# Patient Record
Sex: MALE | Race: WHITE | NOT HISPANIC OR LATINO | Employment: OTHER | URBAN - METROPOLITAN AREA
[De-identification: names, ages, dates, MRNs, and addresses within clinical notes are randomized per-mention and may not be internally consistent; named-entity substitution may affect disease eponyms.]

---

## 2017-01-25 ENCOUNTER — OFFICE VISIT (OUTPATIENT)
Dept: MEDICAL GROUP | Facility: PHYSICIAN GROUP | Age: 72
End: 2017-01-25
Payer: MEDICARE

## 2017-01-25 ENCOUNTER — TELEPHONE (OUTPATIENT)
Dept: MEDICAL GROUP | Facility: PHYSICIAN GROUP | Age: 72
End: 2017-01-25

## 2017-01-25 VITALS
DIASTOLIC BLOOD PRESSURE: 84 MMHG | TEMPERATURE: 96.6 F | HEIGHT: 70 IN | RESPIRATION RATE: 16 BRPM | BODY MASS INDEX: 30.21 KG/M2 | WEIGHT: 211 LBS | HEART RATE: 62 BPM | SYSTOLIC BLOOD PRESSURE: 120 MMHG | OXYGEN SATURATION: 99 %

## 2017-01-25 DIAGNOSIS — G47.52 REM SLEEP BEHAVIOR DISORDER: ICD-10-CM

## 2017-01-25 DIAGNOSIS — Z87.898 HISTORY OF SEIZURES: ICD-10-CM

## 2017-01-25 DIAGNOSIS — I10 ESSENTIAL HYPERTENSION: ICD-10-CM

## 2017-01-25 DIAGNOSIS — R44.1 VISUAL HALLUCINATIONS: ICD-10-CM

## 2017-01-25 PROCEDURE — G8419 CALC BMI OUT NRM PARAM NOF/U: HCPCS | Performed by: FAMILY MEDICINE

## 2017-01-25 PROCEDURE — 1101F PT FALLS ASSESS-DOCD LE1/YR: CPT | Performed by: FAMILY MEDICINE

## 2017-01-25 PROCEDURE — 1036F TOBACCO NON-USER: CPT | Performed by: FAMILY MEDICINE

## 2017-01-25 PROCEDURE — G8482 FLU IMMUNIZE ORDER/ADMIN: HCPCS | Performed by: FAMILY MEDICINE

## 2017-01-25 PROCEDURE — G8432 DEP SCR NOT DOC, RNG: HCPCS | Performed by: FAMILY MEDICINE

## 2017-01-25 PROCEDURE — 3017F COLORECTAL CA SCREEN DOC REV: CPT | Mod: 8P | Performed by: FAMILY MEDICINE

## 2017-01-25 PROCEDURE — 4040F PNEUMOC VAC/ADMIN/RCVD: CPT | Performed by: FAMILY MEDICINE

## 2017-01-25 PROCEDURE — 99214 OFFICE O/P EST MOD 30 MIN: CPT | Performed by: FAMILY MEDICINE

## 2017-01-25 ASSESSMENT — PATIENT HEALTH QUESTIONNAIRE - PHQ9: CLINICAL INTERPRETATION OF PHQ2 SCORE: 0

## 2017-01-25 NOTE — PROGRESS NOTES
"Chief Complaint   Patient presents with   • Sleep Disruption   • Back Pain       HISTORY OF PRESENT ILLNESS: Patient is a 71 y.o. male established patient here today for the following concerns:    1. REM sleep behavior disorder  2. Visual hallucinations  3. History of seizures  Here today with concerns over \"acting out my dreams\".  Reports episodes in which he has physically been fighting animals or people only to injure himself and then wake up. Twice he has had to go to the ER for contusions or lacerations.  The other night he dreamed he was bouncing a basket ball and his wife work him up after he was \"trying to bounce her\".  He reports he also has had years of visual hallucinations, which have not changed.  Reports seeing and hearing things that are not present with good awareness that they are not real.  He also tells me he has remote history of seizures as an adult that came on when he was drinking heavily.  He was briefly treated with dilantin, but after researching it and getting another opinion was taken off of it.  He reports that he has lived with \"earthquakes\" which he feels are likely seizures in which he does not loose consciousness.  He has not had any witnessed tonic clonic movements on syncopal episodes.  Denies any tremors or weakness.  He has had difficulty with some back pain recently that radiates into the left leg, but taking it easy seems to help.        Past Medical, Social, and Family history reviewed and updated in EPIC    Allergies:Flagyl    Current Outpatient Prescriptions   Medication Sig Dispense Refill   • albuterol (VENTOLIN OR PROVENTIL) 108 (90 BASE) MCG/ACT Aero Soln inhalation aerosol Inhale 2 Puffs by mouth every 6 hours as needed for Shortness of Breath. 8.5 g 3   • lisinopril-hydrochlorothiazide (PRINZIDE, ZESTORETIC) 10-12.5 MG per tablet Take 1 Tab by mouth every day. 90 Tab 3   • aspirin EC (ECOTRIN) 81 MG TBEC Take 81 mg by mouth every day.     • cetirizine (ZYRTEC ALLERGY) " "10 MG TABS Take 10 mg by mouth every day.     • ADVAIR DISKUS 250-50 MCG/DOSE AEROSOL POWDER, BREATH ACTIVATED INHALE 1 PUFF BY MOUTH EVERY 12 HOURS 1 Inhaler 3   • azithromycin (ZITHROMAX) 250 MG Tab As directed 6 Tab 0   • metaxalone (SKELAXIN) 800 MG TABS Take 1 Tab by mouth 3 times a day as needed for Mild Pain. 60 Tab 0     No current facility-administered medications for this visit.         ROS:  Review of Systems   Constitutional: Negative for fever, chills, weight loss and malaise/fatigue.   HENT: Negative for ear pain, nosebleeds, congestion, sore throat and neck pain.    Eyes: Negative for blurred vision.   Respiratory: Negative for cough, sputum production, shortness of breath and wheezing.    Cardiovascular: Negative for chest pain, palpitations,  and leg swelling.   Gastrointestinal: Negative for heartburn, nausea, vomiting, diarrhea and abdominal pain.   Genitourinary: Negative for dysuria, urgency and frequency.   Musculoskeletal: Negative for myalgias, back pain and joint pain.   Skin: Negative for rash and itching.   Neurological: Negative for dizziness, tingling, tremors, sensory change, focal weakness and headaches.   Endo/Heme/Allergies: Does not bruise/bleed easily.   Psychiatric/Behavioral: Negative for depression, anxiety, suicidal ideas, insomnia and memory loss.      Exam:  Blood pressure 120/84, pulse 62, temperature 35.9 °C (96.6 °F), resp. rate 16, height 1.778 m (5' 10\"), weight 95.709 kg (211 lb), SpO2 99 %.    General:  Well nourished, well developed in NAD  Head is grossly normal.  Neck: Supple without JVD   Pulmonary:  Normal effort.   Cardiovascular: Regular rate  Extremities: no clubbing, cyanosis, or edema.  Psych: affect appropriate      Please note that this dictation was created using voice recognition software. I have made every reasonable attempt to correct obvious errors, but I expect that there are errors of grammar and possibly content that I did not discover before " finalizing the note.    Assessment/Plan:  1. REM sleep behavior disorder  Discussed with patient that there may be several causes for this.  I'd like to update his imaging and obtain EEG as well as consult with neurology as sometimes these are precursors to other neurodegenerative diseases.    - REFERRAL TO NEUROLOGY  - MR-BRAIN-WITH & W/O; Future  - EEG; Standing    2. Visual hallucinations    - MR-BRAIN-WITH & W/O; Future  - EEG; Standing    3. History of seizures    - MR-BRAIN-WITH & W/O; Future  - EEG; Standing    Follow up after neuro consult.

## 2017-01-25 NOTE — MR AVS SNAPSHOT
"        Florin Brooks   2017 10:40 AM   Office Visit   MRN: [id number]    Department:  [facility]   Dept Phone:  [phone]    Description:  Male : 1945   Provider:  Yessenia Thomas M.D.           Reason for Visit     Sleep Disruption     Back Pain           Allergies as of 2017     Allergen Noted Reactions    Flagyl [Metronidazole Hcl] 2013   Anaphylaxis      You were diagnosed with     REM sleep behavior disorder   [327.42.ICD-9-CM]       Visual hallucinations   [524859]       History of seizures   [771133]         Vital Signs     Blood Pressure Pulse Temperature Respirations Height Weight    120/84 mmHg 62 35.9 °C (96.6 °F) 16 1.778 m (5' 10\") 95.709 kg (211 lb)    Body Mass Index Oxygen Saturation Smoking Status             30.28 kg/m2 99% Former Smoker         Basic Information     Date Of Birth Sex Race Ethnicity Preferred Language    1945 Male Unknown Unknown English      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   10/22/2013 - Present    HTN (hypertension) I10   2013 - Present    Elevated PSA R97.20   2013 - Present    Cervical radiculopathy M54.12   2014 - Present      Health Maintenance        Date Due Completion Dates    IMM DTaP/Tdap/Td Vaccine (1 - Tdap) 12/3/1964 ---    IMM ZOSTER VACCINE 12/3/2005 ---    COLON CANCER SCREENING ANNUAL FIT 10/27/2016 10/27/2015    IMM PNEUMOCOCCAL 65+ (ADULT) LOW/MEDIUM RISK SERIES (2 of 2 - PPSV23) 2017            Current Immunizations     13-VALENT PCV PREVNAR 2016    Influenza Vaccine Adult HD 2016 10:33 AM, 10/27/2015 10:58 AM, 2014      Below and/or attached are the medications your provider " expects you to take. Review all of your home medications and newly ordered medications with your provider and/or pharmacist. Follow medication instructions as directed by your provider and/or pharmacist. Please keep your medication list with you and share with your provider. Update the information when medications are discontinued, doses are changed, or new medications (including over-the-counter products) are added; and carry medication information at all times in the event of emergency situations     Allergies:  FLAGYL - Anaphylaxis               Medications  Valid as of: January 25, 2017 - 10:53 AM    Generic Name Brand Name Tablet Size Instructions for use    Albuterol Sulfate (Aero Soln) albuterol 108 (90 BASE) MCG/ACT Inhale 2 Puffs by mouth every 6 hours as needed for Shortness of Breath.        Aspirin (Tablet Delayed Response) ECOTRIN 81 MG Take 81 mg by mouth every day.        Azithromycin (Tab) ZITHROMAX 250 MG As directed        Cetirizine HCl (Tab) ZYRTEC 10 MG Take 10 mg by mouth every day.        Fluticasone-Salmeterol (AEROSOL POWDER, BREATH ACTIVATED) ADVAIR DISKUS 250-50 MCG/DOSE INHALE 1 PUFF BY MOUTH EVERY 12 HOURS        Lisinopril-Hydrochlorothiazide (Tab) PRINZIDE, ZESTORETIC 10-12.5 MG Take 1 Tab by mouth every day.        Metaxalone (Tab) SKELAXIN 800 MG Take 1 Tab by mouth 3 times a day as needed for Mild Pain.        .                 Medicines prescribed today were sent to:     [id number], [place] - [address] AT [facility] [address]    [address] [facility] [address]    Phone: [phone] Fax: [phone]    Open 24 Hours?: No      Medication refill instructions:       If your prescription bottle indicates you have medication refills left, it is not necessary to call your provider’s office. Please contact your pharmacy and they will refill your medication.    If your prescription bottle indicates you do not have any refills left, you may request refills  at any time through one of the following ways: The online [email or web address] system (except Urgent Care), by calling your provider’s office, or by asking your pharmacy to contact your provider’s office with a refill request. Medication refills are processed only during regular business hours and may not be available until the next business day. Your provider may request additional information or to have a follow-up visit with you prior to refilling your medication.   *Please Note: Medication refills are assigned a new Rx number when refilled electronically. Your pharmacy may indicate that no refills were authorized even though a new prescription for the same medication is available at the pharmacy. Please request the medicine by name with the pharmacy before contacting your provider for a refill.        Your To Do List     Future Labs/Procedures Complete By Expires    MR-BRAIN-WITH & W/O  As directed 1/25/2018    Standing Orders Interval Expires    EEG   1/25/2018      Referral     A referral request has been sent to our patient care coordination department. Please allow 3-5 business days for us to process this request and contact you either by phone or mail. If you do not hear from us by the 5th business day, please call us at [phone].           [email or web address] Access Code: Activation code not generated  Current [email or web address] Status: Active

## 2017-01-25 NOTE — TELEPHONE ENCOUNTER
1. Caller Name: Florin Brooks                                           Call Back Number: [phone] (home)         Patient approves a detailed voicemail message: N\A     Pt is scheduled for an MRI and was told he needs to get his creatinine checked. Please lt pt know when order is submitted and he will come here to get done.  Thank you

## 2017-02-02 RX ORDER — LISINOPRIL AND HYDROCHLOROTHIAZIDE 12.5; 1 MG/1; MG/1
TABLET ORAL
Qty: 90 TAB | Refills: 1 | Status: SHIPPED | OUTPATIENT
Start: 2017-02-02 | End: 2017-07-28 | Stop reason: SDUPTHER

## 2017-02-02 NOTE — TELEPHONE ENCOUNTER
Was the patient seen in the last year in this department? Yes     Does patient have an active prescription for medications requested? No     Received Request Via: Pharmacy      Pt met protocol?: Yes    LAST OV 01/25/17    BP Readings from Last 1 Encounters:   01/25/17 120/84

## 2017-02-02 NOTE — TELEPHONE ENCOUNTER
Refill X 6 months, sent to pharmacy.Pt. Seen in the last 6 months per protocol.   Lab Results   Component Value Date/Time    SODIUM 141 09/23/2016 06:28 AM    POTASSIUM 3.6 09/23/2016 06:28 AM    CHLORIDE 109 09/23/2016 06:28 AM    CO2 24 09/23/2016 06:28 AM    GLUCOSE 94 09/23/2016 06:28 AM    BUN 13 09/23/2016 06:28 AM    CREATININE 0.95 09/23/2016 06:28 AM

## 2017-02-23 ENCOUNTER — APPOINTMENT (OUTPATIENT)
Dept: RADIOLOGY | Facility: MEDICAL CENTER | Age: 72
End: 2017-02-23
Payer: MEDICARE

## 2017-03-24 ENCOUNTER — OFFICE VISIT (OUTPATIENT)
Dept: MEDICAL GROUP | Facility: PHYSICIAN GROUP | Age: 72
End: 2017-03-24
Payer: MEDICARE

## 2017-03-24 VITALS
DIASTOLIC BLOOD PRESSURE: 70 MMHG | HEART RATE: 79 BPM | SYSTOLIC BLOOD PRESSURE: 130 MMHG | OXYGEN SATURATION: 96 % | BODY MASS INDEX: 29.55 KG/M2 | HEIGHT: 70 IN | TEMPERATURE: 97.9 F | WEIGHT: 206.4 LBS | RESPIRATION RATE: 16 BRPM

## 2017-03-24 DIAGNOSIS — Z91.09 ENVIRONMENTAL ALLERGIES: ICD-10-CM

## 2017-03-24 DIAGNOSIS — J44.1 COPD EXACERBATION (HCC): ICD-10-CM

## 2017-03-24 DIAGNOSIS — J44.9 CHRONIC OBSTRUCTIVE PULMONARY DISEASE, UNSPECIFIED COPD TYPE (HCC): ICD-10-CM

## 2017-03-24 DIAGNOSIS — J06.9 UPPER RESPIRATORY TRACT INFECTION, UNSPECIFIED TYPE: ICD-10-CM

## 2017-03-24 PROCEDURE — 4040F PNEUMOC VAC/ADMIN/RCVD: CPT | Performed by: NURSE PRACTITIONER

## 2017-03-24 PROCEDURE — G8482 FLU IMMUNIZE ORDER/ADMIN: HCPCS | Performed by: NURSE PRACTITIONER

## 2017-03-24 PROCEDURE — G8419 CALC BMI OUT NRM PARAM NOF/U: HCPCS | Performed by: NURSE PRACTITIONER

## 2017-03-24 PROCEDURE — 3017F COLORECTAL CA SCREEN DOC REV: CPT | Mod: 8P | Performed by: NURSE PRACTITIONER

## 2017-03-24 PROCEDURE — 1101F PT FALLS ASSESS-DOCD LE1/YR: CPT | Performed by: NURSE PRACTITIONER

## 2017-03-24 PROCEDURE — G8432 DEP SCR NOT DOC, RNG: HCPCS | Performed by: NURSE PRACTITIONER

## 2017-03-24 PROCEDURE — 1036F TOBACCO NON-USER: CPT | Performed by: NURSE PRACTITIONER

## 2017-03-24 PROCEDURE — 99214 OFFICE O/P EST MOD 30 MIN: CPT | Performed by: NURSE PRACTITIONER

## 2017-03-24 RX ORDER — AZITHROMYCIN 250 MG/1
TABLET, FILM COATED ORAL
Qty: 6 TAB | Refills: 0 | Status: SHIPPED | OUTPATIENT
Start: 2017-03-24 | End: 2018-01-09

## 2017-03-24 NOTE — MR AVS SNAPSHOT
"        Florin Brooks   3/24/2017 2:40 PM   Office Visit   MRN: [id number]    Department:  [facility]   Dept Phone:  [phone]    Description:  Male : 1945   Provider:  EZE Salmeron           Reason for Visit     Nasal Congestion x 3 days       Allergies as of 3/24/2017     Allergen Noted Reactions    Flagyl [Metronidazole Hcl] 2013   Anaphylaxis      You were diagnosed with     COPD exacerbation (CMS-HCC)   [898721]       Upper respiratory tract infection, unspecified type   [6583581]       Chronic obstructive pulmonary disease, unspecified COPD type (CMS-HCC)   [2744611]       Environmental allergies   [464118]         Vital Signs     Blood Pressure Pulse Temperature Respirations Height Weight    130/70 mmHg 79 36.6 °C (97.9 °F) 16 1.778 m (5' 10\") 93.622 kg (206 lb 6.4 oz)    Body Mass Index Oxygen Saturation Smoking Status             29.62 kg/m2 96% Former Smoker         Basic Information     Date Of Birth Sex Race Ethnicity Preferred Language    1945 Male Unknown Unknown English      Your appointments     2017  9:00 AM   New Patient with Kj Luz M.D.   [facility] (--)    [address], [address]  [facility] [address] [phone]           Please bring Photo ID, Insurance Cards, All Medication Bottles and copies of any legal documents (such as Living Will, Power of ) If speaking a language besides English please bring an adult . Please arrive 30 minutes prior for check in and registration. You will be receiving a confirmation call a few days before your appointment from our automated call confirmation system.              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   10/22/2013 - Present    HTN (hypertension) I10   11/19/2013 - Present    Elevated PSA R97.20   11/19/2013 - Present    Cervical radiculopathy M54.12   5/20/2014 - Present    URI (upper respiratory infection) J06.9   3/24/2017 - Present      Health Maintenance        Date Due Completion Dates    IMM DTaP/Tdap/Td Vaccine (1 - Tdap) 12/3/1964 ---    IMM ZOSTER VACCINE 12/3/2005 ---    COLON CANCER SCREENING ANNUAL FIT 10/27/2016 10/27/2015    IMM PNEUMOCOCCAL 65+ (ADULT) LOW/MEDIUM RISK SERIES (2 of 2 - PPSV23) 1/5/2017 1/5/2016            Current Immunizations     13-VALENT PCV PREVNAR 1/5/2016    Influenza Vaccine Adult HD 11/1/2016 10:33 AM, 10/27/2015 10:58 AM, 9/30/2014      Below and/or attached are the medications your provider expects you to take. Review all of your home medications and newly ordered medications with your provider and/or pharmacist. Follow medication instructions as directed by your provider and/or pharmacist. Please keep your medication list with you and share with your provider. Update the information when medications are discontinued, doses are changed, or new medications (including over-the-counter products) are added; and carry medication information at all times in the event of emergency situations     Allergies:  FLAGYL - Anaphylaxis               Medications  Valid as of: March 24, 2017 -  7:38 PM    Generic Name Brand Name Tablet Size Instructions for use    Albuterol Sulfate (Aero Soln) albuterol 108 (90 BASE) MCG/ACT Inhale 2 Puffs by mouth every 6 hours as needed for Shortness of Breath.        Aspirin (Tablet Delayed Response) ECOTRIN 81 MG Take 81 mg by mouth every day.        Azithromycin (Tab) ZITHROMAX 250 MG As directed        Cetirizine HCl (Tab) ZYRTEC 10 MG Take 10 mg by mouth every day.        Fluticasone-Salmeterol (AEROSOL POWDER, BREATH ACTIVATED) ADVAIR DISKUS 250-50 MCG/DOSE INHALE 1 PUFF BY MOUTH EVERY 12 HOURS          Lisinopril-Hydrochlorothiazide (Tab) PRINZIDE, ZESTORETIC 10-12.5 MG TAKE 1 TABLET BY MOUTH DAILY        Metaxalone (Tab) SKELAXIN 800 MG Take 1 Tab by mouth 3 times a day as needed for Mild Pain.        .                 Medicines prescribed today were sent to:     [facility]  [place], [place] - [address]    [address] [place] [address]    Phone: [phone] Fax: [phone]    Open 24 Hours?: No      Medication refill instructions:       If your prescription bottle indicates you have medication refills left, it is not necessary to call your provider’s office. Please contact your pharmacy and they will refill your medication.    If your prescription bottle indicates you do not have any refills left, you may request refills at any time through one of the following ways: The online [employer] system (except Urgent Care), by calling your provider’s office, or by asking your pharmacy to contact your provider’s office with a refill request. Medication refills are processed only during regular business hours and may not be available until the next business day. Your provider may request additional information or to have a follow-up visit with you prior to refilling your medication.   *Please Note: Medication refills are assigned a new Rx number when refilled electronically. Your pharmacy may indicate that no refills were authorized even though a new prescription for the same medication is available at the pharmacy. Please request the medicine by name with the pharmacy before contacting your provider for a refill.           [employer] Access Code: Activation code not generated  Current [employer] Status: Active

## 2017-03-24 NOTE — ASSESSMENT & PLAN NOTE
Patient has a previous diagnosis of environmental allergies. He denies that he ever been diagnosed with allergies and is taking Zyrtec for a body rash.

## 2017-03-24 NOTE — PROGRESS NOTES
Subjective:     Chief Complaint   Patient presents with   • Nasal Congestion     x 3 days        HPI:  Florin Brooks is a 71 y.o. male here today to discuss the following:    URI (upper respiratory infection)  Patient reports a week ago he had difficulty breathing when he lays down which resolves when he sits up in the chair. He denies sore throat, ear pain, nasal congestion or cough. He states that he had a bad headache which has since resolved. He denies any fever, but has intermittent chills. He states that he can hear himself wheezing. Patient has COPD and is currently on Advair and Proventil but has not been taking his Advair or Proventil. He denies any allergies.    COPD (chronic obstructive pulmonary disease)  Chronic condition: Patient had COPD for many years. He is treated with Advair and Proventil, but informs me he didn't think he needed the Advair anymore and has never used the Proventil.    Environmental allergies  Patient has a previous diagnosis of environmental allergies. He denies that he ever been diagnosed with allergies and is taking Zyrtec for a body rash.           Current medicines (including changes today)  Current Outpatient Prescriptions   Medication Sig Dispense Refill   • azithromycin (ZITHROMAX) 250 MG Tab As directed 6 Tab 0   • lisinopril-hydrochlorothiazide (PRINZIDE, ZESTORETIC) 10-12.5 MG per tablet TAKE 1 TABLET BY MOUTH DAILY 90 Tab 1   • ADVAIR DISKUS 250-50 MCG/DOSE AEROSOL POWDER, BREATH ACTIVATED INHALE 1 PUFF BY MOUTH EVERY 12 HOURS 1 Inhaler 3   • albuterol (VENTOLIN OR PROVENTIL) 108 (90 BASE) MCG/ACT Aero Soln inhalation aerosol Inhale 2 Puffs by mouth every 6 hours as needed for Shortness of Breath. 8.5 g 3   • aspirin EC (ECOTRIN) 81 MG TBEC Take 81 mg by mouth every day.     • metaxalone (SKELAXIN) 800 MG TABS Take 1 Tab by mouth 3 times a day as needed for Mild Pain. 60 Tab 0   • cetirizine (ZYRTEC ALLERGY) 10 MG TABS Take 10 mg by mouth every day.       No  "current facility-administered medications for this visit.       He  has a past medical history of Shingles; Osteoarthritis (7/5/2013); Shortness of breath (7/5/2013); COPD (chronic obstructive pulmonary disease) (CMS-HCC) (7/5/2013); and ED (erectile dysfunction) (7/5/2013).    ROS   Review of Systems   Constitutional: Negative for fever,weight loss and malaise/fatigue. Positive for chills  HENT: Negative for ear pain, nosebleeds, congestion, sore throat and neck pain.    Respiratory: Negative for cough, sputum production. Positive for shortness of breath and wheezing.    Cardiovascular: Negative for chest pain, palpitations,  and leg swelling.   Neurological: Negative for dizziness, tingling, tremors, sensory change, focal weakness. Positive for headaches  Psychiatric/Behavioral: Negative for depression, anxiety, suicidal ideas, insomnia and memory loss.    All other systems reviewed and are negative except as in HPI.     Objective:   Physical Exam:  Blood pressure 130/70, pulse 79, temperature 36.6 °C (97.9 °F), resp. rate 16, height 1.778 m (5' 10\"), weight 93.622 kg (206 lb 6.4 oz), SpO2 96 %. Body mass index is 29.62 kg/(m^2).   Physical Exam:  Alert, oriented in no acute distress.  Eye contact is good, speech goal directed, affect calm  HEENT: conjunctiva non-injected, sclera non-icteric.  Pinna normal. Nasal passages clear with clear rhinorrhea, oropharynx is cobblestoned with clear postnasal drip  Neck No adenopathy or masses in the neck or supraclavicular regions.  Lungs: Wheezes anterior upper lobes, posterior lobes clear to auscultation normal respiratory effort   CV: regular rate and rhythm.   MS: Normal gait and station    Assessment and Plan:   The following treatment plan was discussed   1. COPD exacerbation (CMS-HCC)  azithromycin (ZITHROMAX) 250 MG Tab   2. Upper respiratory tract infection, unspecified type     3. Chronic obstructive pulmonary disease, unspecified COPD type (CMS-HCC)     4. " Environmental allergies     I have reviewed all recent medications with the patient and answered all questions. Encouraged patient to restart Advair twice daily and continue Zyrtec. He was offered Kenalog but declined.  Followup: Return if symptoms worsen or fail to improve.   Please note that this dictation was created using voice recognition software. I have made every reasonable attempt to correct obvious errors, but I expect that there are errors of grammar and possibly content that I did not discover before finalizing the note.

## 2017-03-24 NOTE — ASSESSMENT & PLAN NOTE
Patient reports a week ago he had difficulty breathing when he lays down which resolves when he sits up in the chair. He denies sore throat, ear pain, nasal congestion or cough. He states that he had a bad headache which has since resolved. He denies any fever, but has intermittent chills. He states that he can hear himself wheezing. Patient has COPD and is currently on Advair and Proventil but has not been taking his Advair or Proventil. He denies any allergies.

## 2017-03-24 NOTE — ASSESSMENT & PLAN NOTE
Chronic condition: Patient had COPD for many years. He is treated with Advair and Proventil, but informs me he didn't think he needed the Advair anymore and has never used the Proventil.

## 2017-03-28 NOTE — TELEPHONE ENCOUNTER
Was the patient seen in the last year in this department? Yes     Does patient have an active prescription for medications requested? Yes     Received Request Via: Pharmacy      Pt met protocol?: Yes, last ov 3/24/17, noted on visit that pt does not use and doesn't think he needs it.

## 2017-04-19 ENCOUNTER — OFFICE VISIT (OUTPATIENT)
Dept: NEUROLOGY | Facility: MEDICAL CENTER | Age: 72
End: 2017-04-19
Payer: MEDICARE

## 2017-04-19 VITALS
BODY MASS INDEX: 29.03 KG/M2 | SYSTOLIC BLOOD PRESSURE: 138 MMHG | HEIGHT: 70 IN | RESPIRATION RATE: 18 BRPM | TEMPERATURE: 99 F | HEART RATE: 67 BPM | OXYGEN SATURATION: 99 % | DIASTOLIC BLOOD PRESSURE: 70 MMHG | WEIGHT: 202.8 LBS

## 2017-04-19 DIAGNOSIS — G47.52 UNCONTROLLED REM SLEEP BEHAVIOR DISORDER: ICD-10-CM

## 2017-04-19 DIAGNOSIS — G20.A1 PARKINSON DISEASE: ICD-10-CM

## 2017-04-19 PROCEDURE — G8419 CALC BMI OUT NRM PARAM NOF/U: HCPCS | Performed by: PSYCHIATRY & NEUROLOGY

## 2017-04-19 PROCEDURE — 1101F PT FALLS ASSESS-DOCD LE1/YR: CPT | Performed by: PSYCHIATRY & NEUROLOGY

## 2017-04-19 PROCEDURE — 99204 OFFICE O/P NEW MOD 45 MIN: CPT | Performed by: PSYCHIATRY & NEUROLOGY

## 2017-04-19 PROCEDURE — 4040F PNEUMOC VAC/ADMIN/RCVD: CPT | Performed by: PSYCHIATRY & NEUROLOGY

## 2017-04-19 PROCEDURE — G8432 DEP SCR NOT DOC, RNG: HCPCS | Performed by: PSYCHIATRY & NEUROLOGY

## 2017-04-19 PROCEDURE — 3017F COLORECTAL CA SCREEN DOC REV: CPT | Mod: 8P | Performed by: PSYCHIATRY & NEUROLOGY

## 2017-04-19 PROCEDURE — 1036F TOBACCO NON-USER: CPT | Performed by: PSYCHIATRY & NEUROLOGY

## 2017-04-19 RX ORDER — CLONAZEPAM 0.5 MG/1
0.5 TABLET ORAL
Qty: 30 TAB | Refills: 3 | Status: SHIPPED | OUTPATIENT
Start: 2017-04-19 | End: 2017-07-28 | Stop reason: SDUPTHER

## 2017-04-19 NOTE — PATIENT INSTRUCTIONS
Resting Tremor-- subtle-- R  Bradykinesia--- R>L  Rigidity-- subtle  Postural balance-- fine       IMPRESSION:    1. 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 Parkinson stage I   3. 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 ( not any more), HTN,     PLAN/RECOMMENDATIONS:      Regarding REM sleep disorder-- we will add Klonopin 0.5mg before sleep  Please call us if side effects or not effective    Regarding the early parkinsonism, we could observe      We explained the staging of Parkinson Disease    Modified Acosta and Yahr staging     Stage 0 No signs of disease   Stage 1 Unilateral disease   Stage 1.5 Unilateral plus axial involvement   Stage 2 Bilateral disease, without impairment of balance   Stage 2.5 Mild bilateral disease, with recovery on pull test   Stage 3  Mild to moderate bilateral disease; some postural instability; physically independent   Stage 4 Severe disability; still able to walk or stand unassisted   Stage 5 Wheelchair bound or bedridden unless aided           We also discussed about the possibility of Deep Brain Stimulation for advanced parkinson disease  Deep Brain Stimulation for Parkinson's Disease Information     At present, the procedure is used only for individuals whose symptoms cannot be adequately controlled with medications. However, only individuals who improve to some degree after taking medication for Parkinson’s benefit from DBS. A variety of conditions may mimic PD but do not respond to medications or DBS.  DBS uses a surgically implanted, battery-operated medical device called an implantable pulse generator (IPG) - similar to a heart pacemaker and approximately the size of a  stopwatch to - deliver electrical stimulation to specific areas in the brain that control movement, thus blocking the abnormal nerve signals that cause PD  symptoms.    Http://www.ninds.nih.gov/disorders/deep_brain_stimulation/deep_brain_stimulation.htm\          SIGNATURE:  Kj Luz      CC:  Yessenia Thomas M.D.

## 2017-04-19 NOTE — MR AVS SNAPSHOT
"        Florin Brooks   2017 9:00 AM   Office Visit   MRN: [id number]    Department:  [facility]   Dept Phone:  [phone]    Description:  Male : 1945   Provider:  Kj Luz M.D.           Reason for Visit     Establish Care sleep,behavior disorder      Allergies as of 2017     Allergen Noted Reactions    Flagyl [Metronidazole Hcl] 2013   Anaphylaxis      You were diagnosed with     Parkinson disease (CMS-HCC)   [603751]       Uncontrolled REM sleep behavior disorder   [733982]         Vital Signs     Blood Pressure Pulse Temperature Respirations Height Weight    138/70 mmHg 67 37.2 °C (99 °F) 18 1.778 m (5' 10\") 91.989 kg (202 lb 12.8 oz)    Body Mass Index Oxygen Saturation Smoking Status             29.10 kg/m2 99% Former Smoker         Basic Information     Date Of Birth Sex Race Ethnicity Preferred Language    1945 Male Unknown Unknown English      Your appointments     2017  9:20 AM   Follow Up Visit with Kj Luz M.D.   [facility] (--)    [address] [place] [address], [address]  [facility] [id number] [phone]           You will be receiving a confirmation call a few days before your appointment from our automated call confirmation system.              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   10/22/2013 - Present    HTN (hypertension) I10   2013 - Present    Elevated PSA R97.20   2013 - Present    Cervical radiculopathy M54.12   2014 - Present    URI (upper respiratory infection) J06.9   3/24/2017 - Present    Parkinson disease (CMS-HCC) G20   2017 - Present    Uncontrolled REM sleep behavior disorder G47.52   2017 - Present   "   Health Maintenance        Date Due Completion Dates    IMM DTaP/Tdap/Td Vaccine (1 - Tdap) 12/3/1964 ---    IMM ZOSTER VACCINE 12/3/2005 ---    COLON CANCER SCREENING ANNUAL FIT 10/27/2016 10/27/2015    IMM PNEUMOCOCCAL 65+ (ADULT) LOW/MEDIUM RISK SERIES (2 of 2 - PPSV23) 1/5/2017 1/5/2016            Current Immunizations     13-VALENT PCV PREVNAR 1/5/2016    Influenza Vaccine Adult HD 11/1/2016 10:33 AM, 10/27/2015 10:58 AM, 9/30/2014      Below and/or attached are the medications your provider expects you to take. Review all of your home medications and newly ordered medications with your provider and/or pharmacist. Follow medication instructions as directed by your provider and/or pharmacist. Please keep your medication list with you and share with your provider. Update the information when medications are discontinued, doses are changed, or new medications (including over-the-counter products) are added; and carry medication information at all times in the event of emergency situations     Allergies:  FLAGYL - Anaphylaxis               Medications  Valid as of: April 19, 2017 -  9:28 AM    Generic Name Brand Name Tablet Size Instructions for use    Albuterol Sulfate (Aero Soln) albuterol 108 (90 BASE) MCG/ACT Inhale 2 Puffs by mouth every 6 hours as needed for Shortness of Breath.        Aspirin (Tablet Delayed Response) ECOTRIN 81 MG Take 81 mg by mouth every day.        Azithromycin (Tab) ZITHROMAX 250 MG As directed        Cetirizine HCl (Tab) ZYRTEC 10 MG Take 10 mg by mouth every day.        ClonazePAM (Tab) KLONOPIN 0.5 MG Take 1 Tab by mouth every bedtime.        Famotidine (Solution) PEPCID 10 MG/ML 20 mg by Intravenous route 2 Times a Day.        Fluticasone-Salmeterol (AEROSOL POWDER, BREATH ACTIVATED) ADVAIR DISKUS 250-50 MCG/DOSE USE 1 INHALATION BY MOUTH EVERY 12 HOURS        Lisinopril-Hydrochlorothiazide (Tab) PRINZIDE, ZESTORETIC 10-12.5 MG TAKE 1 TABLET BY MOUTH DAILY        Metaxalone (Tab)  SKELAXIN 800 MG Take 1 Tab by mouth 3 times a day as needed for Mild Pain.        .                 Medicines prescribed today were sent to:     [facility] - [place], [place] - [address]    [address] [place] [address]    Phone: [phone] Fax: [phone]    Open 24 Hours?: No    [id number] - [place] - [address] [facility]    [address] [place] [address]    Phone: [phone] Fax: [phone]    Open 24 Hours?: No      Medication refill instructions:       If your prescription bottle indicates you have medication refills left, it is not necessary to call your provider’s office. Please contact your pharmacy and they will refill your medication.    If your prescription bottle indicates you do not have any refills left, you may request refills at any time through one of the following ways: The online [employer] system (except Urgent Care), by calling your provider’s office, or by asking your pharmacy to contact your provider’s office with a refill request. Medication refills are processed only during regular business hours and may not be available until the next business day. Your provider may request additional information or to have a follow-up visit with you prior to refilling your medication.   *Please Note: Medication refills are assigned a new Rx number when refilled electronically. Your pharmacy may indicate that no refills were authorized even though a new prescription for the same medication is available at the pharmacy. Please request the medicine by name with the pharmacy before contacting your provider for a refill.        Instructions      Resting Tremor-- subtle-- R  Bradykinesia--- R>L  Rigidity-- subtle  Postural balance-- fine       IMPRESSION:    1. 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 Parkinson stage I   3. 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 ( not any more), HTN,          PLAN/RECOMMENDATIONS:      Regarding REM sleep disorder-- we will add Klonopin 0.5mg before sleep  Please call us if side effects or not effective    Regarding the early parkinsonism, we could observe      We explained the staging of Parkinson Disease    Modified Acosta and Yahr staging     Stage 0 No signs of disease   Stage 1 Unilateral disease   Stage 1.5 Unilateral plus axial involvement   Stage 2 Bilateral disease, without impairment of balance   Stage 2.5 Mild bilateral disease, with recovery on pull test   Stage 3  Mild to moderate bilateral disease; some postural instability; physically independent   Stage 4 Severe disability; still able to walk or stand unassisted   Stage 5 Wheelchair bound or bedridden unless aided           We also discussed about the possibility of Deep Brain Stimulation for advanced parkinson disease  Deep Brain Stimulation for Parkinson's Disease Information     At present, the procedure is used only for individuals whose symptoms cannot be adequately controlled with medications. However, only individuals who improve to some degree after taking medication for Parkinson’s benefit from DBS. A variety of conditions may mimic PD but do not respond to medications or DBS.  DBS uses a surgically implanted, battery-operated medical device called an implantable pulse generator (IPG) - similar to a heart pacemaker and approximately the size of a  stopwatch to - deliver electrical stimulation to specific areas in the brain that control movement, thus blocking the abnormal nerve signals that cause PD symptoms.    Http://www.ninds.nih.gov/disorders/deep_brain_stimulation/deep_brain_stimulation.htm\          SIGNATURE:  Kj Luz      CC:  ADALBERTO Montano Access Code: Activation code not generated  Current [employer] Status: Active

## 2017-04-19 NOTE — PROGRESS NOTES
NEUROLOGY NOTE    Referring Physician  Yessenia Thomas      CHIEF COMPLAINT:  Acting out in the dream and hurt himself--for 3 years  2 times woke up in the floor during in the dreams  Chief Complaint   Patient presents with   • Establish Care     sleep,behavior disorder       PRESENT ILLNESS:   Acting out in the dream and hurt himself--for 3 years  2 times woke up in the floor during in the dreams    In the old days, he was an alcoholic and seizures-- ended with seizures then  But he quit alcohol 20+ years ago and no more seizures since     Myoclonus in the night as well    PAST MEDICAL HISTORY:  Past Medical History   Diagnosis Date   • Shingles    • Osteoarthritis 7/5/2013   • Shortness of breath 7/5/2013   • COPD (chronic obstructive pulmonary disease) (CMS-HCC) 7/5/2013   • ED (erectile dysfunction) 7/5/2013       PAST SURGICAL HISTORY:  Past Surgical History   Procedure Laterality Date   • Foot surgery       neuroma/ganglion cyst   • Knee arthroscopy       bilaterally   • Eye surgery       muscle surgery   • Hernia repair     • Colon resection         FAMILY HISTORY:  Family History   Problem Relation Age of Onset   • Alcohol/Drug Father        SOCIAL HISTORY:  Social History     Social History   • Marital Status:      Spouse Name: N/A   • Number of Children: N/A   • Years of Education: N/A     Occupational History   • Not on file.     Social History Main Topics   • Smoking status: Former Smoker     Quit date: 07/05/1999   • Smokeless tobacco: Never Used   • Alcohol Use: No   • Drug Use: No      Comment:    • Sexual Activity: Not on file     Other Topics Concern   • Not on file     Social History Narrative     ALLERGIES:  Allergies   Allergen Reactions   • Flagyl [Metronidazole Hcl] Anaphylaxis     TOBHX  History   Smoking status   • Former Smoker   • Quit date: 07/05/1999   Smokeless tobacco   • Never Used     ALCHX  History   Alcohol Use No     DRUGHX  History   Drug Use No     Comment:   "          MEDICATIONS:  Current Outpatient Prescriptions   Medication   • famotidine (PEPCID) 10 MG/ML Solution   • lisinopril-hydrochlorothiazide (PRINZIDE, ZESTORETIC) 10-12.5 MG per tablet   • aspirin EC (ECOTRIN) 81 MG TBEC   • cetirizine (ZYRTEC ALLERGY) 10 MG TABS   • ADVAIR DISKUS 250-50 MCG/DOSE AEROSOL POWDER, BREATH ACTIVATED   • azithromycin (ZITHROMAX) 250 MG Tab   • albuterol (VENTOLIN OR PROVENTIL) 108 (90 BASE) MCG/ACT Aero Soln inhalation aerosol   • metaxalone (SKELAXIN) 800 MG TABS     No current facility-administered medications for this visit.       REVIEW OF SYSTEM:    Constitutional: Denies fevers, Denies weight changes   Eyes: Denies changes in vision, no eye pain   Ears/Nose/Throat/Mouth: Denies nasal congestion or sore throat   Cardiovascular: HTN  Respiratory: Denies SOB.   Gastrointestinal/Hepatic: Denies abdominal pain, nausea, vomiting, diarrhea, constipation or GI bleeding   Genitourinary: Denies bladder dysfunction, dysuria or frequency   Musculoskeletal/Rheum: Denies joint pain and swelling   Skin/Breast: Denies rash, denies breast lumps or discharge   Neurological: REM sleep   Psychiatric: denies mood disorder   Endocrine: denies hx of diabetes or thyroid dysfunction   Heme/Oncology/Lymph Nodes: Denies enlarged lymph nodes, denies brusing or known bleeding disorder   Allergic/Immunologic: Denies hx of allergies         PHYSICAL AND NEUROLOGICAL EXMAINATIONS:  VITAL SIGNS: /70 mmHg  Pulse 67  Temp(Src) 37.2 °C (99 °F)  Resp 18  Ht 1.778 m (5' 10\")  Wt 91.989 kg (202 lb 12.8 oz)  BMI 29.10 kg/m2  SpO2 99%  CURRENT WEIGHT:   BMI: Body mass index is 29.1 kg/(m^2).  PREVIOUS WEIGHTS:  Wt Readings from Last 25 Encounters:   04/19/17 91.989 kg (202 lb 12.8 oz)   03/24/17 93.622 kg (206 lb 6.4 oz)   01/25/17 95.709 kg (211 lb)   03/01/16 90.266 kg (199 lb)   01/05/16 91.173 kg (201 lb)   05/20/14 93.895 kg (207 lb)   10/29/13 91.445 kg (201 lb 9.6 oz)   10/22/13 89.449 kg (197 " lb 3.2 oz)   08/26/13 89.994 kg (198 lb 6.4 oz)   07/05/13 87.998 kg (194 lb)       General appearance of patient: WDWN(+) NAD(+)    EYES  o Fundus : Papilledem(-) Exudates(-) Hemorrhage(-)  Nervous System  Orientation to time, place and person(+)  Memory normal(-)  Language: aphasia(-)  Knowledge: past(+) Current(+)  Attention(+)  Cranial Nerves  • Nerve 2: intact  • Nerve 3,4,6: intact  • Nerve 5 : intact  • Nerve 7: intact  • Nerve 8: intact  • Nerve 9 & 10: intact  • Nerve 11: intact  • Nerve 12: intact  Muscle Power and muscle tone: symmetric, normal in upper and lower  Sensory System: Pin sensation intact(+)  Reflexes: symmetric throughout  Cerebellar Function FNP normal   Gait : Steady(+)   Heart and Vascular  Peripheral Vasucular system : Edema (-) Swelling(-)  RHB, Breathing sound clear  abdomen bowel sound normoactive  Extremities freely moveable  Joints no contracture       NEUROIMAGING: I reviewed the MRI/CT of brain       Resting Tremor-- subtle-- R  Bradykinesia--- R>L  Rigidity-- subtle  Postural balance-- fine       IMPRESSION:    1. 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 Parkinson stage I   3. 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 ( not any more), HTN,     PLAN/RECOMMENDATIONS:      Regarding REM sleep disorder-- we will add Klonopin 0.5mg before sleep  Please call us if side effects or not effective    Regarding the early parkinsonism, we could observe      We explained the staging of Parkinson Disease    Modified Acosta and Yahr staging     Stage 0 No signs of disease   Stage 1 Unilateral disease   Stage 1.5 Unilateral plus axial involvement   Stage 2 Bilateral disease, without impairment of balance   Stage 2.5 Mild bilateral disease, with recovery on pull test   Stage 3  Mild to moderate bilateral disease; some postural instability; physically independent   Stage 4 Severe disability; still able to walk or stand unassisted   Stage  5 Wheelchair bound or bedridden unless aided           We also discussed about the possibility of Deep Brain Stimulation for advanced parkinson disease  Deep Brain Stimulation for Parkinson's Disease Information     At present, the procedure is used only for individuals whose symptoms cannot be adequately controlled with medications. However, only individuals who improve to some degree after taking medication for Parkinson’s benefit from DBS. A variety of conditions may mimic PD but do not respond to medications or DBS.  DBS uses a surgically implanted, battery-operated medical device called an implantable pulse generator (IPG) - similar to a heart pacemaker and approximately the size of a  stopwatch to - deliver electrical stimulation to specific areas in the brain that control movement, thus blocking the abnormal nerve signals that cause PD symptoms.    Http://www.ninds.nih.gov/disorders/deep_brain_stimulation/deep_brain_stimulation.htm\          SIGNATURE:  Kj Luz      CC:  Yessenia Thomas M.D.

## 2017-07-28 ENCOUNTER — OFFICE VISIT (OUTPATIENT)
Dept: NEUROLOGY | Facility: MEDICAL CENTER | Age: 72
End: 2017-07-28
Payer: MEDICARE

## 2017-07-28 VITALS
TEMPERATURE: 98.4 F | HEIGHT: 70 IN | BODY MASS INDEX: 28.63 KG/M2 | HEART RATE: 63 BPM | SYSTOLIC BLOOD PRESSURE: 122 MMHG | DIASTOLIC BLOOD PRESSURE: 80 MMHG | WEIGHT: 200 LBS | OXYGEN SATURATION: 97 % | RESPIRATION RATE: 16 BRPM

## 2017-07-28 DIAGNOSIS — G47.52 UNCONTROLLED REM SLEEP BEHAVIOR DISORDER: ICD-10-CM

## 2017-07-28 DIAGNOSIS — G20.A1 PARKINSON DISEASE: ICD-10-CM

## 2017-07-28 PROCEDURE — 99214 OFFICE O/P EST MOD 30 MIN: CPT | Performed by: PSYCHIATRY & NEUROLOGY

## 2017-07-28 RX ORDER — LISINOPRIL AND HYDROCHLOROTHIAZIDE 12.5; 1 MG/1; MG/1
TABLET ORAL
Qty: 90 TAB | Refills: 0 | Status: SHIPPED | OUTPATIENT
Start: 2017-07-28 | End: 2017-10-30 | Stop reason: SDUPTHER

## 2017-07-28 RX ORDER — CLONAZEPAM 1 MG/1
1 TABLET ORAL
Qty: 30 TAB | Refills: 5 | Status: SHIPPED | OUTPATIENT
Start: 2017-07-28 | End: 2018-01-29 | Stop reason: SDUPTHER

## 2017-07-28 NOTE — PATIENT INSTRUCTIONS
Resting Tremor-- subtle-- L  Bradykinesia---L  Rigidity-- subtle  Postural balance-- fine     Recall 1/3 ( first) 3/3 ( second trial)    IMPRESSION:    1. 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 Parkinson stage I -II  3. 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

## 2017-07-28 NOTE — MR AVS SNAPSHOT
"        Florin Brooks   2017 9:20 AM   Office Visit   MRN: [id number]    Department:  [facility]   Dept Phone:  [phone]    Description:  Male : 1945   Provider:  Kj Luz M.D.           Reason for Visit     Follow-Up Parkinson disease      Allergies as of 2017     Allergen Noted Reactions    Flagyl [Metronidazole Hcl] 2013   Anaphylaxis      You were diagnosed with     Parkinson disease (CMS-HCC)   [723776]       Uncontrolled REM sleep behavior disorder   [261889]         Vital Signs     Blood Pressure Pulse Temperature Respirations Height Weight    122/80 mmHg 63 36.9 °C (98.4 °F) 16 1.778 m (5' 10\") 90.719 kg (200 lb)    Body Mass Index Oxygen Saturation Smoking Status             28.70 kg/m2 97% Former Smoker         Basic Information     Date Of Birth Sex Race Ethnicity Preferred Language    1945 Male Unknown Unknown English      Your appointments     2018  8:00 AM   Follow Up Visit with Kj Luz M.D.   [facility] (--)    [address] [place] [address], [address]  [facility] [id number] [phone]           You will be receiving a confirmation call a few days before your appointment from our automated call confirmation system.              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   10/22/2013 - Present    HTN (hypertension) I10   2013 - Present    Elevated PSA R97.20   2013 - Present    Cervical radiculopathy M54.12   2014 - Present    URI (upper respiratory infection) J06.9   3/24/2017 - Present    Parkinson disease (CMS-HCC) G20   2017 - Present    Uncontrolled REM sleep behavior disorder G47.52   2017 - Present      Health " Maintenance        Date Due Completion Dates    IMM DTaP/Tdap/Td Vaccine (1 - Tdap) 12/3/1964 ---    IMM ZOSTER VACCINE 12/3/2005 ---    COLON CANCER SCREENING ANNUAL FIT 10/27/2016 10/27/2015    IMM PNEUMOCOCCAL 65+ (ADULT) LOW/MEDIUM RISK SERIES (2 of 2 - PPSV23) 1/5/2017 1/5/2016    IMM INFLUENZA (1) 9/1/2017 11/1/2016, 10/27/2015, 9/30/2014            Current Immunizations     13-VALENT PCV PREVNAR 1/5/2016    Influenza Vaccine Adult HD 11/1/2016 10:33 AM, 10/27/2015 10:58 AM, 9/30/2014      Below and/or attached are the medications your provider expects you to take. Review all of your home medications and newly ordered medications with your provider and/or pharmacist. Follow medication instructions as directed by your provider and/or pharmacist. Please keep your medication list with you and share with your provider. Update the information when medications are discontinued, doses are changed, or new medications (including over-the-counter products) are added; and carry medication information at all times in the event of emergency situations     Allergies:  FLAGYL - Anaphylaxis               Medications  Valid as of: July 28, 2017 -  9:41 AM    Generic Name Brand Name Tablet Size Instructions for use    Albuterol Sulfate (Aero Soln) albuterol 108 (90 BASE) MCG/ACT Inhale 2 Puffs by mouth every 6 hours as needed for Shortness of Breath.        Aspirin (Tablet Delayed Response) ECOTRIN 81 MG Take 81 mg by mouth every day.        Azithromycin (Tab) ZITHROMAX 250 MG As directed        Cetirizine HCl (Tab) ZYRTEC 10 MG Take 10 mg by mouth every day.        ClonazePAM (Tab) KLONOPIN 1 MG Take 1 Tab by mouth every bedtime.        Famotidine (Solution) PEPCID 10 MG/ML 20 mg by Intravenous route 2 Times a Day.        Fluticasone-Salmeterol (AEROSOL POWDER, BREATH ACTIVATED) ADVAIR DISKUS 250-50 MCG/DOSE USE 1 INHALATION BY MOUTH EVERY 12 HOURS        Lisinopril-Hydrochlorothiazide (Tab) PRINZIDE, ZESTORETIC 10-12.5 MG  TAKE 1 TABLET BY MOUTH DAILY        Metaxalone (Tab) SKELAXIN 800 MG Take 1 Tab by mouth 3 times a day as needed for Mild Pain.        .                 Medicines prescribed today were sent to:     [facility] - [place], [place] - [address]    [address] [place] [address]    Phone: [phone] Fax: [phone]    Open 24 Hours?: No    [id number] - [place] - [address] [facility]    [address] [place] [address]    Phone: [phone] Fax: [phone]    Open 24 Hours?: No      Medication refill instructions:       If your prescription bottle indicates you have medication refills left, it is not necessary to call your provider’s office. Please contact your pharmacy and they will refill your medication.    If your prescription bottle indicates you do not have any refills left, you may request refills at any time through one of the following ways: The online [employer] system (except Urgent Care), by calling your provider’s office, or by asking your pharmacy to contact your provider’s office with a refill request. Medication refills are processed only during regular business hours and may not be available until the next business day. Your provider may request additional information or to have a follow-up visit with you prior to refilling your medication.   *Please Note: Medication refills are assigned a new Rx number when refilled electronically. Your pharmacy may indicate that no refills were authorized even though a new prescription for the same medication is available at the pharmacy. Please request the medicine by name with the pharmacy before contacting your provider for a refill.        Instructions        Resting Tremor-- subtle-- L  Bradykinesia---L  Rigidity-- subtle  Postural balance-- fine     Recall 1/3 ( first) 3/3 ( second trial)    IMPRESSION:    1. 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 Parkinson  stage I -II  3. 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

## 2017-07-28 NOTE — PROGRESS NOTES
NEUROLOGY NOTE    Referring Physician  Yessenia Thomas      CHIEF COMPLAINT:  Acting out in the dream and hurt himself--for 3 years  2 times woke up in the floor during in the dreams  Chief Complaint   Patient presents with   • Follow-Up     Parkinson disease       PRESENT ILLNESS:   Acting out in the dream and hurt himself--for 3 years  2 times woke up in the floor during in the dreams    In the old days, he was an alcoholic and seizures-- ended with seizures then  But he quit alcohol 20+ years ago and no more seizures since     Myoclonus in the night as well    PAST MEDICAL HISTORY:  Past Medical History   Diagnosis Date   • Shingles    • Osteoarthritis 7/5/2013   • Shortness of breath 7/5/2013   • COPD (chronic obstructive pulmonary disease) (CMS-HCC) 7/5/2013   • ED (erectile dysfunction) 7/5/2013       PAST SURGICAL HISTORY:  Past Surgical History   Procedure Laterality Date   • Foot surgery       neuroma/ganglion cyst   • Knee arthroscopy       bilaterally   • Eye surgery       muscle surgery   • Hernia repair     • Colon resection         FAMILY HISTORY:  Family History   Problem Relation Age of Onset   • Alcohol/Drug Father        SOCIAL HISTORY:  Social History     Social History   • Marital Status:      Spouse Name: N/A   • Number of Children: N/A   • Years of Education: N/A     Occupational History   • Not on file.     Social History Main Topics   • Smoking status: Former Smoker     Quit date: 07/05/1999   • Smokeless tobacco: Never Used   • Alcohol Use: No   • Drug Use: No      Comment:    • Sexual Activity: Not on file     Other Topics Concern   • Not on file     Social History Narrative     ALLERGIES:  Allergies   Allergen Reactions   • Flagyl [Metronidazole Hcl] Anaphylaxis     TOBHX  History   Smoking status   • Former Smoker   • Quit date: 07/05/1999   Smokeless tobacco   • Never Used     ALCHX  History   Alcohol Use No     DRUGHX  History   Drug Use No     Comment:   "          MEDICATIONS:  Current Outpatient Prescriptions   Medication   • famotidine (PEPCID) 10 MG/ML Solution   • clonazepam (KLONOPIN) 0.5 MG Tab   • ADVAIR DISKUS 250-50 MCG/DOSE AEROSOL POWDER, BREATH ACTIVATED   • azithromycin (ZITHROMAX) 250 MG Tab   • lisinopril-hydrochlorothiazide (PRINZIDE, ZESTORETIC) 10-12.5 MG per tablet   • albuterol (VENTOLIN OR PROVENTIL) 108 (90 BASE) MCG/ACT Aero Soln inhalation aerosol   • aspirin EC (ECOTRIN) 81 MG TBEC   • metaxalone (SKELAXIN) 800 MG TABS   • cetirizine (ZYRTEC ALLERGY) 10 MG TABS     No current facility-administered medications for this visit.       REVIEW OF SYSTEM:    Constitutional: Denies fevers, Denies weight changes   Eyes: Denies changes in vision, no eye pain   Ears/Nose/Throat/Mouth: Denies nasal congestion or sore throat   Cardiovascular: HTN  Respiratory: Denies SOB.   Gastrointestinal/Hepatic: Denies abdominal pain, nausea, vomiting, diarrhea, constipation or GI bleeding   Genitourinary: Denies bladder dysfunction, dysuria or frequency   Musculoskeletal/Rheum: Denies joint pain and swelling   Skin/Breast: Denies rash, denies breast lumps or discharge   Neurological: REM sleep   Psychiatric: denies mood disorder   Endocrine: denies hx of diabetes or thyroid dysfunction   Heme/Oncology/Lymph Nodes: Denies enlarged lymph nodes, denies brusing or known bleeding disorder   Allergic/Immunologic: Denies hx of allergies         PHYSICAL AND NEUROLOGICAL EXMAINATIONS:  VITAL SIGNS: /80 mmHg  Pulse 63  Temp(Src) 36.9 °C (98.4 °F)  Resp 16  Ht 1.778 m (5' 10\")  Wt 90.719 kg (200 lb)  BMI 28.70 kg/m2  SpO2 97%  CURRENT WEIGHT:   BMI: Body mass index is 28.7 kg/(m^2).  PREVIOUS WEIGHTS:  Wt Readings from Last 25 Encounters:   07/28/17 90.719 kg (200 lb)   04/19/17 91.989 kg (202 lb 12.8 oz)   03/24/17 93.622 kg (206 lb 6.4 oz)   01/25/17 95.709 kg (211 lb)   03/01/16 90.266 kg (199 lb)   01/05/16 91.173 kg (201 lb)   05/20/14 93.895 kg (207 lb) "   10/29/13 91.445 kg (201 lb 9.6 oz)   10/22/13 89.449 kg (197 lb 3.2 oz)   08/26/13 89.994 kg (198 lb 6.4 oz)   07/05/13 87.998 kg (194 lb)       General appearance of patient: WDWN(+) NAD(+)    EYES  o Fundus : Papilledem(-) Exudates(-) Hemorrhage(-)  Nervous System  Orientation to time, place and person(+)  Memory normal(-)  Language: aphasia(-)  Knowledge: past(+) Current(+)  Attention(+)  Cranial Nerves  • Nerve 2: intact  • Nerve 3,4,6: intact  • Nerve 5 : intact  • Nerve 7: intact  • Nerve 8: intact  • Nerve 9 & 10: intact  • Nerve 11: intact  • Nerve 12: intact  Muscle Power and muscle tone: symmetric, normal in upper and lower  Sensory System: Pin sensation intact(+)  Reflexes: symmetric throughout  Cerebellar Function FNP normal   Gait : Steady(+)   Heart and Vascular  Peripheral Vasucular system : Edema (-) Swelling(-)  RHB, Breathing sound clear  abdomen bowel sound normoactive  Extremities freely moveable  Joints no contracture       NEUROIMAGING: I reviewed the MRI/CT of brain       Resting Tremor-- subtle-- L  Bradykinesia---L  Rigidity-- subtle  Postural balance-- fine     Recall 1/3 ( first) 3/3 ( second trial)    IMPRESSION:    1. 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 Parkinson stage I -II  3. 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 ( not any more), HTN,     PLAN/RECOMMENDATIONS:      Regarding REM sleep disorder-- we will up Klonopin 1 mg before sleep  Please call us if side effects or not effective    Regarding the early parkinsonism, we could observe      We explained the staging of Parkinson Disease    Modified Acosta and Yahr staging     Stage 0 No signs of disease   Stage 1 Unilateral disease   Stage 1.5 Unilateral plus axial involvement   Stage 2 Bilateral disease, without impairment of balance   Stage 2.5 Mild bilateral disease, with recovery on pull test   Stage 3  Mild to moderate bilateral disease; some postural  instability; physically independent   Stage 4 Severe disability; still able to walk or stand unassisted   Stage 5 Wheelchair bound or bedridden unless aided           We also discussed about the possibility of Deep Brain Stimulation for advanced parkinson disease  Deep Brain Stimulation for Parkinson's Disease Information     At present, the procedure is used only for individuals whose symptoms cannot be adequately controlled with medications. However, only individuals who improve to some degree after taking medication for Parkinson’s benefit from DBS. A variety of conditions may mimic PD but do not respond to medications or DBS.  DBS uses a surgically implanted, battery-operated medical device called an implantable pulse generator (IPG) - similar to a heart pacemaker and approximately the size of a  stopwatch to - deliver electrical stimulation to specific areas in the brain that control movement, thus blocking the abnormal nerve signals that cause PD symptoms.    Http://www.ninds.nih.gov/disorders/deep_brain_stimulation/deep_brain_stimulation.htm\          SIGNATURE:  Kj Luz      CC:  Yessenia Thomas M.D.

## 2017-09-05 ENCOUNTER — APPOINTMENT (OUTPATIENT)
Dept: SOCIAL WORK | Facility: CLINIC | Age: 72
End: 2017-09-05
Payer: MEDICARE

## 2017-09-05 PROCEDURE — 90662 IIV NO PRSV INCREASED AG IM: CPT | Performed by: REGISTERED NURSE

## 2017-09-05 PROCEDURE — G0008 ADMIN INFLUENZA VIRUS VAC: HCPCS | Performed by: REGISTERED NURSE

## 2017-10-19 NOTE — TELEPHONE ENCOUNTER
Was the patient seen in the last year in this department? Yes     Does patient have an active prescription for medications requested? No     Received Request Via: Pharmacy      Pt met protocol?: Yes, ov pcp 1/17, ov op 3/17

## 2017-10-30 ENCOUNTER — TELEPHONE (OUTPATIENT)
Dept: MEDICAL GROUP | Facility: PHYSICIAN GROUP | Age: 72
End: 2017-10-30

## 2017-10-30 DIAGNOSIS — R73.01 IMPAIRED FASTING GLUCOSE: ICD-10-CM

## 2017-10-30 DIAGNOSIS — I10 ESSENTIAL HYPERTENSION: ICD-10-CM

## 2017-10-31 NOTE — TELEPHONE ENCOUNTER
Was the patient seen in the last year in this department? Yes     Does patient have an active prescription for medications requested? No     Received Request Via: Pharmacy      Pt met protocol?: No Last OV 03/2017  BP Readings from Last 1 Encounters:   07/28/17 122/80

## 2017-11-01 RX ORDER — LISINOPRIL AND HYDROCHLOROTHIAZIDE 12.5; 1 MG/1; MG/1
TABLET ORAL
Qty: 90 TAB | Refills: 0 | Status: SHIPPED | OUTPATIENT
Start: 2017-11-01 | End: 2018-01-29 | Stop reason: SDUPTHER

## 2017-11-07 ENCOUNTER — HOSPITAL ENCOUNTER (OUTPATIENT)
Dept: LAB | Facility: MEDICAL CENTER | Age: 72
End: 2017-11-07
Attending: FAMILY MEDICINE
Payer: MEDICARE

## 2017-11-07 DIAGNOSIS — I10 ESSENTIAL HYPERTENSION: ICD-10-CM

## 2017-11-07 DIAGNOSIS — R73.01 IMPAIRED FASTING GLUCOSE: ICD-10-CM

## 2017-11-07 LAB
ALBUMIN SERPL BCP-MCNC: 4 G/DL (ref 3.2–4.9)
ALBUMIN/GLOB SERPL: 1.6 G/DL
ALP SERPL-CCNC: 58 U/L (ref 30–99)
ALT SERPL-CCNC: 20 U/L (ref 2–50)
ANION GAP SERPL CALC-SCNC: 6 MMOL/L (ref 0–11.9)
AST SERPL-CCNC: 16 U/L (ref 12–45)
BILIRUB SERPL-MCNC: 1.4 MG/DL (ref 0.1–1.5)
BUN SERPL-MCNC: 16 MG/DL (ref 8–22)
CALCIUM SERPL-MCNC: 9 MG/DL (ref 8.5–10.5)
CHLORIDE SERPL-SCNC: 106 MMOL/L (ref 96–112)
CHOLEST SERPL-MCNC: 167 MG/DL (ref 100–199)
CO2 SERPL-SCNC: 29 MMOL/L (ref 20–33)
CREAT SERPL-MCNC: 0.95 MG/DL (ref 0.5–1.4)
EST. AVERAGE GLUCOSE BLD GHB EST-MCNC: 126 MG/DL
GFR SERPL CREATININE-BSD FRML MDRD: >60 ML/MIN/1.73 M 2
GLOBULIN SER CALC-MCNC: 2.5 G/DL (ref 1.9–3.5)
GLUCOSE SERPL-MCNC: 98 MG/DL (ref 65–99)
HBA1C MFR BLD: 6 % (ref 0–5.6)
HDLC SERPL-MCNC: 38 MG/DL
LDLC SERPL CALC-MCNC: 103 MG/DL
POTASSIUM SERPL-SCNC: 3.8 MMOL/L (ref 3.6–5.5)
PROT SERPL-MCNC: 6.5 G/DL (ref 6–8.2)
SODIUM SERPL-SCNC: 141 MMOL/L (ref 135–145)
TRIGL SERPL-MCNC: 131 MG/DL (ref 0–149)

## 2017-11-07 PROCEDURE — 36415 COLL VENOUS BLD VENIPUNCTURE: CPT | Mod: GA

## 2017-11-07 PROCEDURE — 80053 COMPREHEN METABOLIC PANEL: CPT

## 2017-11-07 PROCEDURE — 83036 HEMOGLOBIN GLYCOSYLATED A1C: CPT | Mod: GA

## 2017-11-07 PROCEDURE — 80061 LIPID PANEL: CPT

## 2017-11-13 ENCOUNTER — PATIENT MESSAGE (OUTPATIENT)
Dept: HEALTH INFORMATION MANAGEMENT | Facility: OTHER | Age: 72
End: 2017-11-13

## 2017-11-15 ENCOUNTER — PATIENT OUTREACH (OUTPATIENT)
Dept: HEALTH INFORMATION MANAGEMENT | Facility: OTHER | Age: 72
End: 2017-11-15

## 2017-11-15 NOTE — PROGRESS NOTES
Attempt #:1    WebIZ Checked & Epic Updated: Yes  · WebIZ Recommendations: Patient is up to date on all vaccines  · Is patient due for Tdap? NO  · Is patient due for Shingles? NO  [facility] Verified: yes  Verify PCP: yes    Communication Preference Obtained: yes     Review Care Team: yes    Annual Wellness Visit Scheduling  1. Scheduling Status:Scheduled    Care Gap Scheduling (Attempt to Schedule EACH Overdue Care Gap!)     Health Maintenance Due   Topic Date Due   • Annual Wellness Visit  1945   • PFT SCREENING-FEV1 AND FEV/FVC RATIO / SPIROMETRY SHOULD BE PERFORMED ANNUALLY  12/03/1963        Scheduled patient for Annual Wellness Visit       [employer] Activation: sent activation code  [employer] Kanwal: no  Virtual Visits: no  Opt In to Text Messages: no

## 2018-01-02 ENCOUNTER — TELEPHONE (OUTPATIENT)
Dept: MEDICAL GROUP | Facility: PHYSICIAN GROUP | Age: 73
End: 2018-01-02

## 2018-01-02 NOTE — TELEPHONE ENCOUNTER
ANNUAL WELLNESS VISIT PRE-VISIT PLANNING     1.  Reviewed2 note from last office visit with PCP: YES    2.  If any orders were placed at last visit, do we have Results/Consult Notes?        •  Labs - Labs ordered, completed on 11/7/17 and results are in chart.   Note: If patient appointment is for lab review and patient did not complete labs, check with provider if OK to reschedule patient until labs completed.       •  Imaging - Imaging was not ordered at last office visit.        •  Referrals - Referral ordered, patient has NOT been seen. Scheduled to be seen on 1/29/18    3.  Immunizations were updated in Epic using WebIZ?: Epic matches WebIZ       •  WebIZ Recommendations: none       •  Is patient due for Tdap? NO       •  Is patient due for Shingles? NO     4.  Patient is due for the following Health Maintenance Topics:   Health Maintenance Due   Topic Date Due   • Annual Wellness Visit  1945   • PFT SCREENING-FEV1 AND FEV/FVC RATIO / SPIROMETRY SHOULD BE PERFORMED ANNUALLY  12/03/1963       - Patient is up-to-date on all Health Maintenance topics. No records have been requested at this time.    5.  Reviewed/Updated the following with patient:       •   Preferred Pharmacy? YES       •   Preferred Lab? YES       •   Preferred Communication? YES       •   Allergies? YES       •   Medications? YES. Was Abstract Encounter opened and chart updated? YES       •   Social History? YES. Was Abstract Encounter opened and chart updated? YES       •   Family History (document living status of immediate family members and if + hx of cancer, diabetes, hypertension, hyperlipidemia, heart attack, stroke) YES. Was Abstract Encounter opened and chart updated? YES    6.  Care Team Updated:       •   DME Company (gait device, O2, CPAP, etc.): NO       •   Other Specialists (eye doctor, derm, GYN, cardiology, endo, etc): NO    7.  Patient has the following Care Path diagnoses on Problem List:  COPD: Patient refuses  treatment.    1.  Is patient under the care of a pulmonologist? No.  2.  Has patient ever completed a PFT or spirometry? No.  3.  Is patient on oxygen or CPAP? No.  4.  Has patient ever had instruction on inhaler technique by health care professional? No  5.  Is patient interested in a referral to respiratory therapy for more information on COPD, inhaler technique, and/or information on establishing an action plan?  No, patient is NOT interested.        8.  Specialty Comments was updated with diagnosis information provided by SCP: N\A    9.  Patient was advised: “This is a free wellness visit. The provider will screen for medical conditions to help you stay healthy. If you have other concerns to address you may be asked to discuss these at a separate visit or there may be an additional fee.”     6.  Patient was informed to arrive 15 min prior to their scheduled appointment and bring in their medication bottles.

## 2018-01-09 ENCOUNTER — OFFICE VISIT (OUTPATIENT)
Dept: MEDICAL GROUP | Facility: PHYSICIAN GROUP | Age: 73
End: 2018-01-09
Payer: MEDICARE

## 2018-01-09 VITALS
TEMPERATURE: 36.6 F | BODY MASS INDEX: 27.44 KG/M2 | DIASTOLIC BLOOD PRESSURE: 70 MMHG | HEIGHT: 71 IN | WEIGHT: 196 LBS | SYSTOLIC BLOOD PRESSURE: 126 MMHG | HEART RATE: 60 BPM | OXYGEN SATURATION: 98 % | RESPIRATION RATE: 12 BRPM

## 2018-01-09 DIAGNOSIS — G47.52 UNCONTROLLED REM SLEEP BEHAVIOR DISORDER: ICD-10-CM

## 2018-01-09 DIAGNOSIS — L50.3 DERMAGRAPHY: ICD-10-CM

## 2018-01-09 DIAGNOSIS — M15.9 PRIMARY OSTEOARTHRITIS INVOLVING MULTIPLE JOINTS: ICD-10-CM

## 2018-01-09 DIAGNOSIS — I10 ESSENTIAL HYPERTENSION: ICD-10-CM

## 2018-01-09 DIAGNOSIS — G20.A1 PARKINSON DISEASE: ICD-10-CM

## 2018-01-09 DIAGNOSIS — R97.20 ELEVATED PSA: ICD-10-CM

## 2018-01-09 DIAGNOSIS — J44.9 CHRONIC OBSTRUCTIVE PULMONARY DISEASE, UNSPECIFIED COPD TYPE (HCC): ICD-10-CM

## 2018-01-09 DIAGNOSIS — Z91.09 ENVIRONMENTAL ALLERGIES: ICD-10-CM

## 2018-01-09 DIAGNOSIS — M54.12 CERVICAL RADICULOPATHY: ICD-10-CM

## 2018-01-09 DIAGNOSIS — N52.9 ERECTILE DYSFUNCTION, UNSPECIFIED ERECTILE DYSFUNCTION TYPE: ICD-10-CM

## 2018-01-09 PROBLEM — J06.9 URI (UPPER RESPIRATORY INFECTION): Status: RESOLVED | Noted: 2017-03-24 | Resolved: 2018-01-09

## 2018-01-09 PROCEDURE — G0438 PPPS, INITIAL VISIT: HCPCS | Performed by: FAMILY MEDICINE

## 2018-01-09 ASSESSMENT — PATIENT HEALTH QUESTIONNAIRE - PHQ9: CLINICAL INTERPRETATION OF PHQ2 SCORE: 0

## 2018-01-09 NOTE — PROGRESS NOTES
Chief Complaint   Patient presents with   • Annual Wellness Visit         HPI:  Florin is a 72 y.o. 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 30 Tab 5   • famotidine (PEPCID) 10 MG/ML Solution 20 mg by Intravenous route 2 Times a Day.     • aspirin EC (ECOTRIN) 81 MG TBEC Take 81 mg by mouth every day.     • cetirizine (ZYRTEC ALLERGY) 10 MG TABS Take 10 mg by mouth every day.     • ADVAIR DISKUS 250-50 MCG/DOSE AEROSOL POWDER, BREATH ACTIVATED USE ONE INHALATION BY MOUTH EVERY TWELVE HOURS 3 Inhaler 1   • azithromycin (ZITHROMAX) 250 MG Tab As directed (Patient not taking: Reported on 4/19/2017) 6 Tab 0   • albuterol (VENTOLIN OR PROVENTIL) 108 (90 BASE) MCG/ACT Aero Soln inhalation aerosol Inhale 2 Puffs by mouth every 6 hours as needed for Shortness of Breath. 8.5 g 3   • metaxalone (SKELAXIN) 800 MG TABS Take 1 Tab by mouth 3 times a day as needed for Mild Pain. 60 Tab 0     No current facility-administered medications for this visit.         Patient is taking medications as noted in medication list.  Current supplements as per medication list.     Allergies: Flagyl [metronidazole hcl]    Current social contact/activities: AA     Is patient  current with immunizations? Yes.    He  reports that he quit smoking about 18 years ago. He has never used smokeless tobacco. He reports that he does not drink alcohol or use drugs.  Counseling given: Not Answered        DPA/Advanced directive: Patient does not have an Advanced Directive.  A packet and workshop information was given on Advanced Directives. Patient does not want packet    ROS:    Gait: Uses no assistive device   Ostomy: no   Other tubes: no   Amputations: no   Chronic oxygen use no   Last eye exam 2016; Has appointment for next week   Wears hearing aids: no   : Denies any urinary leakage during the last 6 months      Screening:  No query    Depression Screening    Little interest or pleasure in doing things?  0 - not at all  Feeling down, depressed, or hopeless? 0 - not at all  Patient Health Questionnaire Score: 0    If depressive symptoms identified deferred to follow up visit unless specifically addressed in assessment and plan.    Interpretation of PHQ-9 Total Score   Score Severity   1-4 No Depression   5-9 Mild Depression   10-14 Moderate Depression   15-19 Moderately Severe Depression   20-27 Severe Depression    Screening for Cognitive Impairment    Three Minute Recall (apple, watch, hiro)  3/3 Kitchen, village baby  Draw clock face with all 12 numbers set to the hand to show 10 minutes past 11 o'clock    5/5   3:40  If cognitive concerns identified, deferred for follow up unless specifically addressed in assessment and plan.    Fall Risk Assessment    Has the patient had two or more falls in the last year or any fall with injury in the last year?  No  If fall risk identified, deferred for follow up unless specifically addressed in assessment and plan.    Safety Assessment    Throw rugs on floor.  No  Handrails on all stairs.  Yes  Good lighting in all hallways.  Yes  Difficulty hearing.  No  Patient counseled about all safety risks that were identified.    Functional Assessment  ADLs    Are there any barriers preventing you from cooking for yourself or meeting nutritional needs?  No.    Are there any barriers preventing you from driving safely or obtaining transportation?  No.    Are there any barriers preventing you from using a telephone or calling for help?  No.    Are there any barriers preventing you from shopping?  No.    Are there any barriers preventing you from taking care of your own finances?  No.    Are there any barriers preventing you from managing your medications?  No.    Are you currently engaging any exercise or physical activity?  Yes.  Goes to gym every morning for an hour    Health Maintenance Summary                Annual Wellness Visit Overdue 1945     PFT SCREENING-FEV1 AND FEV/FVC RATIO / SPIROMETRY SHOULD BE PERFORMED ANNUALLY Overdue 12/3/1963     COLON CANCER SCREENING ANNUAL FIT Postponed 5/15/2018 Originally 10/27/2016. Patient Refused     Done 10/27/2015 OCCULT BLOOD FECES IMMUNOASSAY    IMM DTaP/Tdap/Td Vaccine Next Due 8/19/2021      Done 8/19/2011 Imm Admin: Tdap Vaccine     Patient has more history with this topic...          Patient Care Team:  Yessenia Thomas M.D. as PCP - General (Family Medicine)  Kj Luz M.D. (Neurology)    Social History   Substance Use Topics   • Smoking status: Former Smoker     Quit date: 7/5/1999   • Smokeless tobacco: Never Used   • Alcohol use No     Family History   Problem Relation Age of Onset   • Alcohol/Drug Father    • Lung Disease Sister      He  has a past medical history of COPD (chronic obstructive pulmonary disease) (CMS-[facility]) (7/5/2013); ED (erectile dysfunction) (7/5/2013); Osteoarthritis (7/5/2013); Shingles; and Shortness of breath (7/5/2013).   Past Surgical History:   Procedure Laterality Date   • COLON RESECTION     • EYE SURGERY      muscle surgery   • FOOT SURGERY      neuroma/ganglion cyst   • HERNIA REPAIR     • KNEE ARTHROSCOPY      bilaterally           Exam:     Blood pressure 126/70, pulse 60,  "temperature (!) 2.6 °C (36.6 °F), resp. rate 12, height 1.803 m (5' 11\"), weight 88.9 kg (196 lb), SpO2 98 %. Body mass index is 27.34 kg/m².    Hearing excellent.    Dentition goodAlert, oriented in no acute distress.  Eye contact is good, speech goal directed, affect calm      Assessment and Plan. The following treatment and monitoring plan is recommended:    1. Cervical radiculopathy  Stable, no worsening  - Initial Wellness Visit - Includes PPPS ()    2. Chronic obstructive pulmonary disease, unspecified COPD type (CMS-HCC)  Asymptomatic at this time, holding inhalers at this time.   - Initial Wellness Visit - Includes PPPS ()    3. Dermagraphy  Noted  - Initial Wellness Visit - Includes PPPS ()    4. Erectile dysfunction, unspecified erectile dysfunction type  No changes.   - Initial Wellness Visit - Includes PPPS ()    5. Elevated PSA  Recheck   - Initial Wellness Visit - Includes PPPS ()  - PROSTATE SPECIFIC AG SCREENING; Future    6. Environmental allergies  Noted, improved over winter.   - Initial Wellness Visit - Includes PPPS ()    7. Essential hypertension  Controlled on current meds, no dose adjustment needed.   - Initial Wellness Visit - Includes PPPS ()    8. Primary osteoarthritis involving multiple joints    - Initial Wellness Visit - Includes PPPS ()    9. Parkinson disease (CMS-HCC)  Continue clonazepam  - Initial Wellness Visit - Includes PPPS ()    10. Uncontrolled REM sleep behavior disorder  Continue clonazepam   - Initial Wellness Visit - Includes PPPS ()        Services suggested: No services needed at this time  Health Care Screening recommendations as per orders if indicated.  Referrals offered: PT/OT/Nutrition counseling/Behavioral Health/Smoking cessation as per orders if indicated.    Discussion today about general wellness and lifestyle habits:    · Prevent falls and reduce trip hazards; Cautioned about securing or removing " sarah.  · Have a working fire alarm and carbon monoxide detector;   · Engage in regular physical activity and social activities       Follow-up: 6 month follow up

## 2018-01-29 ENCOUNTER — OFFICE VISIT (OUTPATIENT)
Dept: NEUROLOGY | Facility: MEDICAL CENTER | Age: 73
End: 2018-01-29
Payer: MEDICARE

## 2018-01-29 VITALS
WEIGHT: 161.2 LBS | HEART RATE: 67 BPM | SYSTOLIC BLOOD PRESSURE: 126 MMHG | DIASTOLIC BLOOD PRESSURE: 70 MMHG | TEMPERATURE: 98.7 F | BODY MASS INDEX: 22.57 KG/M2 | RESPIRATION RATE: 16 BRPM | OXYGEN SATURATION: 95 % | HEIGHT: 71 IN

## 2018-01-29 DIAGNOSIS — G20.A1 PARKINSON DISEASE: ICD-10-CM

## 2018-01-29 DIAGNOSIS — G47.52 UNCONTROLLED REM SLEEP BEHAVIOR DISORDER: ICD-10-CM

## 2018-01-29 PROCEDURE — 99214 OFFICE O/P EST MOD 30 MIN: CPT | Performed by: PSYCHIATRY & NEUROLOGY

## 2018-01-29 RX ORDER — FAMOTIDINE 20 MG/1
10 TABLET, FILM COATED ORAL 2 TIMES DAILY
COMMUNITY
End: 2019-08-23

## 2018-01-29 RX ORDER — CLONAZEPAM 1 MG/1
1 TABLET ORAL
Qty: 90 TAB | Refills: 1 | Status: SHIPPED | OUTPATIENT
Start: 2018-01-29 | End: 2018-07-23 | Stop reason: SDUPTHER

## 2018-01-29 NOTE — PATIENT INSTRUCTIONS
IMPRESSION:    1. 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 Parkinson stage I -II  3. 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 ( not any more), HTN,     PLAN/RECOMMENDATIONS:      Regarding REM sleep disorder-- could adjust Klonopin from 0.25mg to 1 mg before sleep by himself  Please call us if side effects or not effective    Regarding the early parkinsonism, we could observe      We explained the staging of Parkinson Disease    Modified Acosta and Yahr staging     Stage 0 No signs of disease   Stage 1 Unilateral disease   Stage 1.5 Unilateral plus axial involvement   Stage 2 Bilateral disease, without impairment of balance   Stage 2.5 Mild bilateral disease, with recovery on pull test   Stage 3  Mild to moderate bilateral disease; some postural instability; physically independent   Stage 4 Severe disability; still able to walk or stand unassisted   Stage 5 Wheelchair bound or bedridden unless aided

## 2018-01-29 NOTE — PROGRESS NOTES
NEUROLOGY NOTE    Referring Physician  Yessenia Thomas      CHIEF COMPLAINT:  Acting out in the dream and hurt himself--for 3 years  2 times woke up in the floor during in the dreams  Chief Complaint   Patient presents with   • Follow-Up     Parkinson disease       PRESENT ILLNESS:   Acting out in the dream and hurt himself--for 3 years  2 times woke up in the floor during in the dreams    In the old days, he was an alcoholic and seizures-- ended with seizures then  But he quit alcohol 20+ years ago and no more seizures since     Myoclonus in the night as well    PAST MEDICAL HISTORY:  Past Medical History:   Diagnosis Date   • COPD (chronic obstructive pulmonary disease) ([facility]) 7/5/2013   • ED (erectile dysfunction) 7/5/2013   • Osteoarthritis 7/5/2013   • Shingles    • Shortness of breath 7/5/2013       PAST SURGICAL HISTORY:  Past Surgical History:   Procedure Laterality Date   • COLON RESECTION     • EYE SURGERY      muscle surgery   • FOOT SURGERY      neuroma/ganglion cyst   • HERNIA REPAIR     • KNEE ARTHROSCOPY      bilaterally       FAMILY HISTORY:  Family History   Problem Relation Age of Onset   • Alcohol/Drug Father    • Lung Disease Sister        SOCIAL HISTORY:  Social History     Social History   • Marital status:      Spouse name: N/A   • Number of children: N/A   • Years of education: N/A     Occupational History   • Not on file.     Social History Main Topics   • Smoking status: Former Smoker     Quit date: 7/5/1999   • Smokeless tobacco: Never Used   • Alcohol use No   • Drug use: No      Comment:    • Sexual activity: No     Other Topics Concern   • Not on file     Social History Narrative   • No narrative on file     ALLERGIES:  Allergies   Allergen Reactions   • Flagyl [Metronidazole Hcl] Anaphylaxis     TOBHX  History   Smoking Status   • Former Smoker   • Quit date: 7/5/1999   Smokeless Tobacco   • Never Used     ALCHX  History   Alcohol Use No     DRUGHX  History   Drug Use  "No     Comment:            MEDICATIONS:  Current Outpatient Prescriptions   Medication   • famotidine (PEPCID) 20 MG Tab   • lisinopril-hydrochlorothiazide (PRINZIDE, ZESTORETIC) 10-12.5 MG per tablet   • clonazepam (KLONOPIN) 1 MG Tab   • famotidine (PEPCID) 10 MG/ML Solution   • aspirin EC (ECOTRIN) 81 MG TBEC   • cetirizine (ZYRTEC ALLERGY) 10 MG TABS   • ADVAIR DISKUS 250-50 MCG/DOSE AEROSOL POWDER, BREATH ACTIVATED   • metaxalone (SKELAXIN) 800 MG TABS     No current facility-administered medications for this visit.        REVIEW OF SYSTEM:    Constitutional: Denies fevers, Denies weight changes   Eyes: Denies changes in vision, no eye pain   Ears/Nose/Throat/Mouth: Denies nasal congestion or sore throat   Cardiovascular: HTN  Respiratory: Denies SOB.   Gastrointestinal/Hepatic: Denies abdominal pain, nausea, vomiting, diarrhea, constipation or GI bleeding   Genitourinary: Denies bladder dysfunction, dysuria or frequency   Musculoskeletal/Rheum: Denies joint pain and swelling   Skin/Breast: Denies rash, denies breast lumps or discharge   Neurological: REM sleep   Psychiatric: denies mood disorder   Endocrine: denies hx of diabetes or thyroid dysfunction   Heme/Oncology/Lymph Nodes: Denies enlarged lymph nodes, denies brusing or known bleeding disorder   Allergic/Immunologic: Denies hx of allergies         PHYSICAL AND NEUROLOGICAL EXMAINATIONS:  VITAL SIGNS: /70   Pulse 67   Temp 37.1 °C (98.7 °F)   Resp 16   Ht 1.803 m (5' 11\")   Wt 73.1 kg (161 lb 3.2 oz)   SpO2 95%   BMI 22.48 kg/m²   CURRENT WEIGHT:   BMI: Body mass index is 22.48 kg/m².  PREVIOUS WEIGHTS:  Wt Readings from Last 25 Encounters:   01/29/18 73.1 kg (161 lb 3.2 oz)   01/09/18 88.9 kg (196 lb)   07/28/17 90.7 kg (200 lb)   04/19/17 92 kg (202 lb 12.8 oz)   03/24/17 93.6 kg (206 lb 6.4 oz)   01/25/17 95.7 kg (211 lb)   03/01/16 90.3 kg (199 lb)   01/05/16 91.2 kg (201 lb)   05/20/14 93.9 kg (207 lb)   10/29/13 91.4 kg (201 lb " 9.6 oz)   10/22/13 89.4 kg (197 lb 3.2 oz)   08/26/13 90 kg (198 lb 6.4 oz)   07/05/13 88 kg (194 lb)       General appearance of patient: WDWN(+) NAD(+)    EYES  o Fundus : Papilledem(-) Exudates(-) Hemorrhage(-)  Nervous System  Orientation to time, place and person(+)  Memory normal(-)  Language: aphasia(-)  Knowledge: past(+) Current(+)  Attention(+)  Cranial Nerves  • Nerve 2: intact  • Nerve 3,4,6: intact  • Nerve 5 : intact  • Nerve 7: intact  • Nerve 8: intact  • Nerve 9 & 10: intact  • Nerve 11: intact  • Nerve 12: intact  Muscle Power and muscle tone: symmetric, normal in upper and lower  Sensory System: Pin sensation intact(+)  Reflexes: symmetric throughout  Cerebellar Function FNP normal   Gait : Steady(+)   Heart and Vascular  Peripheral Vasucular system : Edema (-) Swelling(-)  RHB, Breathing sound clear  abdomen bowel sound normoactive  Extremities freely moveable  Joints no contracture       NEUROIMAGING: I reviewed the MRI/CT of brain       Resting Tremor-- subtle-- L  Bradykinesia---L  Rigidity-- subtle  Postural balance-- fine     Recall 3/3     IMPRESSION:    1. 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 Parkinson stage I -II  3. 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 ( not any more), HTN,     PLAN/RECOMMENDATIONS:      Regarding REM sleep disorder-- could adjust Klonopin from 0.25mg to 1 mg before sleep by himself  Please call us if side effects or not effective    Regarding the early parkinsonism, we could observe      We explained the staging of Parkinson Disease    Modified Acosta and Yahr staging     Stage 0 No signs of disease   Stage 1 Unilateral disease   Stage 1.5 Unilateral plus axial involvement   Stage 2 Bilateral disease, without impairment of balance   Stage 2.5 Mild bilateral disease, with recovery on pull test   Stage 3  Mild to moderate bilateral disease; some postural instability; physically independent   Stage 4  Severe disability; still able to walk or stand unassisted   Stage 5 Wheelchair bound or bedridden unless aided           We also discussed about the possibility of Deep Brain Stimulation for advanced parkinson disease  Deep Brain Stimulation for Parkinson's Disease Information     At present, the procedure is used only for individuals whose symptoms cannot be adequately controlled with medications. However, only individuals who improve to some degree after taking medication for Parkinson’s benefit from DBS. A variety of conditions may mimic PD but do not respond to medications or DBS.  DBS uses a surgically implanted, battery-operated medical device called an implantable pulse generator (IPG) - similar to a heart pacemaker and approximately the size of a  stopwatch to - deliver electrical stimulation to specific areas in the brain that control movement, thus blocking the abnormal nerve signals that cause PD symptoms.    Http://www.ninds.nih.gov/disorders/deep_brain_stimulation/deep_brain_stimulation.htm\          SIGNATURE:  Kj Luz      CC:  Yessenia Thomas M.D.

## 2018-01-30 NOTE — TELEPHONE ENCOUNTER
Was the patient seen in the last year in this department? Yes     Does patient have an active prescription for medications requested? No     Received Request Via: Pharmacy      Pt met protocol?: Yes, OV earlier this month   BP Readings from Last 1 Encounters:   01/29/18 126/70

## 2018-01-31 RX ORDER — LISINOPRIL AND HYDROCHLOROTHIAZIDE 12.5; 1 MG/1; MG/1
TABLET ORAL
Qty: 90 TAB | Refills: 1 | Status: SHIPPED | OUTPATIENT
Start: 2018-01-31 | End: 2018-07-23 | Stop reason: SDUPTHER

## 2018-01-31 NOTE — TELEPHONE ENCOUNTER
Refill X 6 months, sent to pharmacy.Pt. Seen in the last 6 months per protocol.   Lab Results   Component Value Date/Time    SODIUM 141 11/07/2017 06:39 AM    POTASSIUM 3.8 11/07/2017 06:39 AM    CHLORIDE 106 11/07/2017 06:39 AM    CO2 29 11/07/2017 06:39 AM    GLUCOSE 98 11/07/2017 06:39 AM    BUN 16 11/07/2017 06:39 AM    CREATININE 0.95 11/07/2017 06:39 AM

## 2018-07-06 ENCOUNTER — TELEPHONE (OUTPATIENT)
Dept: MEDICAL GROUP | Facility: PHYSICIAN GROUP | Age: 73
End: 2018-07-06

## 2018-07-06 NOTE — TELEPHONE ENCOUNTER
Patient states he was seen over at [facility] for bursitis.  He states that it is not getting better.  I advised that I would schedule an appointment with Dr. Thomas but she is scheduled out.  He stated that they told him to go to [facility].  I advised that he would probably be able to get in quicker there and to try and give them a call.  Patient understood and will call us back if he has any issues.

## 2018-07-23 ENCOUNTER — OFFICE VISIT (OUTPATIENT)
Dept: NEUROLOGY | Facility: MEDICAL CENTER | Age: 73
End: 2018-07-23
Payer: MEDICARE

## 2018-07-23 VITALS
WEIGHT: 199.7 LBS | HEIGHT: 71 IN | DIASTOLIC BLOOD PRESSURE: 78 MMHG | OXYGEN SATURATION: 94 % | RESPIRATION RATE: 16 BRPM | BODY MASS INDEX: 27.96 KG/M2 | HEART RATE: 60 BPM | SYSTOLIC BLOOD PRESSURE: 118 MMHG | TEMPERATURE: 97 F

## 2018-07-23 DIAGNOSIS — G20.A1 PARKINSON DISEASE: ICD-10-CM

## 2018-07-23 DIAGNOSIS — G47.52 UNCONTROLLED REM SLEEP BEHAVIOR DISORDER: ICD-10-CM

## 2018-07-23 PROCEDURE — 99214 OFFICE O/P EST MOD 30 MIN: CPT | Performed by: PSYCHIATRY & NEUROLOGY

## 2018-07-23 RX ORDER — CLONAZEPAM 1 MG/1
1 TABLET ORAL
Qty: 90 TAB | Refills: 1 | Status: SHIPPED | OUTPATIENT
Start: 2018-07-23 | End: 2019-01-19

## 2018-07-23 NOTE — PROGRESS NOTES
NEUROLOGY NOTE    Referring Physician  Yessenia Thomas      CHIEF COMPLAINT:  Acting out in the dream and hurt himself--for 3 years  2 times woke up in the floor during in the dreams  Chief Complaint   Patient presents with   • Follow-Up     Parkinson disease        PRESENT ILLNESS:   Remains stable since then  Acting out in the dream and hurt himself--for 3 years  2 times woke up in the floor during in the dreams    In the old days, he was an alcoholic and seizures-- ended with seizures then  But he quit alcohol 20+ years ago and no more seizures since     Myoclonus in the night as well    PAST MEDICAL HISTORY:  Past Medical History:   Diagnosis Date   • COPD (chronic obstructive pulmonary disease) (HCC) 7/5/2013   • ED (erectile dysfunction) 7/5/2013   • Osteoarthritis 7/5/2013   • Shingles    • Shortness of breath 7/5/2013       PAST SURGICAL HISTORY:  Past Surgical History:   Procedure Laterality Date   • COLON RESECTION     • EYE SURGERY      muscle surgery   • FOOT SURGERY      neuroma/ganglion cyst   • HERNIA REPAIR     • KNEE ARTHROSCOPY      bilaterally       FAMILY HISTORY:  Family History   Problem Relation Age of Onset   • Alcohol/Drug Father    • Lung Disease Sister        SOCIAL HISTORY:  Social History     Social History   • Marital status:      Spouse name: N/A   • Number of children: N/A   • Years of education: N/A     Occupational History   • Not on file.     Social History Main Topics   • Smoking status: Former Smoker     Quit date: 7/5/1999   • Smokeless tobacco: Never Used   • Alcohol use No   • Drug use: No      Comment:    • Sexual activity: No     Other Topics Concern   • Not on file     Social History Narrative   • No narrative on file     ALLERGIES:  Allergies   Allergen Reactions   • Flagyl [Metronidazole Hcl] Anaphylaxis     TOBHX  History   Smoking Status   • Former Smoker   • Quit date: 7/5/1999   Smokeless Tobacco   • Never Used     ALCHX  History   Alcohol Use No  "    DRUGHX  History   Drug Use No     Comment:            MEDICATIONS:  Current Outpatient Prescriptions   Medication   • lisinopril-hydrochlorothiazide (PRINZIDE, ZESTORETIC) 10-12.5 MG per tablet   • clonazepam (KLONOPIN) 1 MG Tab   • famotidine (PEPCID) 20 MG Tab   • ADVAIR DISKUS 250-50 MCG/DOSE AEROSOL POWDER, BREATH ACTIVATED   • famotidine (PEPCID) 10 MG/ML Solution   • aspirin EC (ECOTRIN) 81 MG TBEC   • metaxalone (SKELAXIN) 800 MG TABS   • cetirizine (ZYRTEC ALLERGY) 10 MG TABS     No current facility-administered medications for this visit.        REVIEW OF SYSTEM:    Constitutional: Denies fevers, Denies weight changes   Eyes: Denies changes in vision, no eye pain   Ears/Nose/Throat/Mouth: Denies nasal congestion or sore throat   Cardiovascular: HTN  Respiratory: Denies SOB.   Gastrointestinal/Hepatic: Denies abdominal pain, nausea, vomiting, diarrhea, constipation or GI bleeding   Genitourinary: Denies bladder dysfunction, dysuria or frequency   Musculoskeletal/Rheum: Denies joint pain and swelling   Skin/Breast: Denies rash, denies breast lumps or discharge   Neurological: REM sleep disorder  Psychiatric: denies mood disorder   Endocrine: denies hx of diabetes or thyroid dysfunction   Heme/Oncology/Lymph Nodes: Denies enlarged lymph nodes, denies brusing or known bleeding disorder   Allergic/Immunologic: Denies hx of allergies         PHYSICAL AND NEUROLOGICAL EXMAINATIONS:  VITAL SIGNS: /78   Pulse 60   Temp 36.1 °C (97 °F)   Resp 16   Ht 1.803 m (5' 11\")   Wt 90.6 kg (199 lb 11.2 oz)   SpO2 94%   BMI 27.85 kg/m²   CURRENT WEIGHT:   BMI: Body mass index is 27.85 kg/m².  PREVIOUS WEIGHTS:  Wt Readings from Last 25 Encounters:   07/23/18 90.6 kg (199 lb 11.2 oz)   01/29/18 73.1 kg (161 lb 3.2 oz)   01/09/18 88.9 kg (196 lb)   07/28/17 90.7 kg (200 lb)   04/19/17 92 kg (202 lb 12.8 oz)   03/24/17 93.6 kg (206 lb 6.4 oz)   01/25/17 95.7 kg (211 lb)   03/01/16 90.3 kg (199 lb) "   01/05/16 91.2 kg (201 lb)   05/20/14 93.9 kg (207 lb)   10/29/13 91.4 kg (201 lb 9.6 oz)   10/22/13 89.4 kg (197 lb 3.2 oz)   08/26/13 90 kg (198 lb 6.4 oz)   07/05/13 88 kg (194 lb)       General appearance of patient: WDWN(+) NAD(+)    EYES  o Fundus : Papilledem(-) Exudates(-) Hemorrhage(-)  Nervous System  Orientation to time, place and person(+)  Memory normal(-)      ________________________________________________________________________      We also asked the patient to copy Archimedean spiral , writing  The patient's work will be scanned into the media section    ________________________________________________________________________    Language: aphasia(-)  Knowledge: past(+) Current(+)  Attention(+)  Cranial Nerves  • Nerve 2: intact  • Nerve 3,4,6: intact  • Nerve 5 : intact  • Nerve 7: intact  • Nerve 8: intact  • Nerve 9 & 10: intact  • Nerve 11: intact  • Nerve 12: intact  Muscle Power and muscle tone: symmetric, normal in upper and lower  Sensory System: Pin sensation intact(+)  Reflexes: symmetric throughout  Cerebellar Function FNP normal   Gait : Steady(+)   Heart and Vascular  Peripheral Vasucular system : Edema (-) Swelling(-)  RHB, Breathing sound clear  abdomen bowel sound normoactive  Extremities freely moveable  Joints no contracture       NEUROIMAGING: I reviewed the MRI/CT of brain       Resting Tremor-- subtle-- L  Bradykinesia---L  Rigidity-- subtle  Postural balance-- fine     Recall 3/3     IMPRESSION:    1. 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 Parkinson stage I -II  3. 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 ( not any more), HTN,     PLAN/RECOMMENDATIONS:      Regarding REM sleep disorder-- could adjust Klonopin from 0.25mg to 1 mg before sleep by himself  Please call us if side effects or not effective    Regarding the early parkinsonism, we could observe    The patient is exercising on daily basis      We  explained the staging of Parkinson Disease    Modified Acosta and Yahr staging     Stage 0 No signs of disease   Stage 1 Unilateral disease   Stage 1.5 Unilateral plus axial involvement   Stage 2 Bilateral disease, without impairment of balance   Stage 2.5 Mild bilateral disease, with recovery on pull test   Stage 3  Mild to moderate bilateral disease; some postural instability; physically independent   Stage 4 Severe disability; still able to walk or stand unassisted   Stage 5 Wheelchair bound or bedridden unless aided           We also discussed about the possibility of Deep Brain Stimulation for advanced parkinson disease  Deep Brain Stimulation for Parkinson's Disease Information     At present, the procedure is used only for individuals whose symptoms cannot be adequately controlled with medications. However, only individuals who improve to some degree after taking medication for Parkinson’s benefit from DBS. A variety of conditions may mimic PD but do not respond to medications or DBS.  DBS uses a surgically implanted, battery-operated medical device called an implantable pulse generator (IPG) - similar to a heart pacemaker and approximately the size of a  stopwatch to - deliver electrical stimulation to specific areas in the brain that control movement, thus blocking the abnormal nerve signals that cause PD symptoms.    Http://www.ninds.nih.gov/disorders/deep_brain_stimulation/deep_brain_stimulation.htm\          SIGNATURE:  Kj Luz      CC:  Yessenia Thomas M.D.

## 2018-07-23 NOTE — PATIENT INSTRUCTIONS
IMPRESSION:    1. 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 Parkinson stage I -II  3. 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 ( not any more), HTN,     PLAN/RECOMMENDATIONS:      Regarding REM sleep disorder-- could adjust Klonopin from 0.25mg to 1 mg before sleep by himself  Please call us if side effects or not effective    Regarding the early parkinsonism, we could observe    The patient is exercising on daily basis

## 2018-07-24 RX ORDER — LISINOPRIL AND HYDROCHLOROTHIAZIDE 12.5; 1 MG/1; MG/1
TABLET ORAL
Qty: 90 TAB | Refills: 1 | Status: SHIPPED | OUTPATIENT
Start: 2018-07-24 | End: 2019-01-22 | Stop reason: SDUPTHER

## 2018-07-24 NOTE — TELEPHONE ENCOUNTER
Was the patient seen in the last year in this department? Yes     Does patient have an active prescription for medications requested? No     Received Request Via: Pharmacy      Pt met protocol?: Yes, OV 1/18   BP Readings from Last 1 Encounters:   07/23/18 118/78

## 2019-01-23 ENCOUNTER — OFFICE VISIT (OUTPATIENT)
Dept: NEUROLOGY | Facility: MEDICAL CENTER | Age: 74
End: 2019-01-23
Payer: MEDICARE

## 2019-01-23 VITALS
DIASTOLIC BLOOD PRESSURE: 74 MMHG | TEMPERATURE: 97.6 F | WEIGHT: 201.4 LBS | HEIGHT: 71 IN | RESPIRATION RATE: 16 BRPM | SYSTOLIC BLOOD PRESSURE: 117 MMHG | BODY MASS INDEX: 28.19 KG/M2 | OXYGEN SATURATION: 96 % | HEART RATE: 57 BPM

## 2019-01-23 DIAGNOSIS — G47.52 REM SLEEP BEHAVIOR DISORDER: ICD-10-CM

## 2019-01-23 DIAGNOSIS — G20.A1 PARKINSON DISEASE: ICD-10-CM

## 2019-01-23 PROCEDURE — 99214 OFFICE O/P EST MOD 30 MIN: CPT | Performed by: PSYCHIATRY & NEUROLOGY

## 2019-01-23 RX ORDER — CLONAZEPAM 1 MG/1
0.5 TABLET ORAL
COMMUNITY
End: 2019-01-23 | Stop reason: SDUPTHER

## 2019-01-23 RX ORDER — CLONAZEPAM 1 MG/1
0.5 TABLET ORAL NIGHTLY PRN
Qty: 60 TAB | Refills: 1 | Status: SHIPPED | OUTPATIENT
Start: 2019-01-23 | End: 2019-05-22 | Stop reason: SDUPTHER

## 2019-01-23 ASSESSMENT — PATIENT HEALTH QUESTIONNAIRE - PHQ9: CLINICAL INTERPRETATION OF PHQ2 SCORE: 0

## 2019-01-23 NOTE — PROGRESS NOTES
NEUROLOGY NOTE    Referring Physician  Yessenia Thomas      CHIEF COMPLAINT:  Stable since last visit  Acting out in the dream and hurt himself- since 2014  2 times woke up in the floor during in the dreams  Chief Complaint   Patient presents with   • Follow-Up     Parkinson disease       PRESENT ILLNESS:   Remains stable since then  Acting out in the dream and hurt himself- since 2014  2 times woke up in the floor during in the dreams    In the old days, he was an alcoholic and seizures-- ended with seizures then  But he quit alcohol 20+ years ago and no more seizures since     Myoclonus in the night as well    PAST MEDICAL HISTORY:  Past Medical History:   Diagnosis Date   • COPD (chronic obstructive pulmonary disease) (HCC) 7/5/2013   • ED (erectile dysfunction) 7/5/2013   • Osteoarthritis 7/5/2013   • Shingles    • Shortness of breath 7/5/2013       PAST SURGICAL HISTORY:  Past Surgical History:   Procedure Laterality Date   • COLON RESECTION     • EYE SURGERY      muscle surgery   • FOOT SURGERY      neuroma/ganglion cyst   • HERNIA REPAIR     • KNEE ARTHROSCOPY      bilaterally       FAMILY HISTORY:  Family History   Problem Relation Age of Onset   • Alcohol/Drug Father    • Lung Disease Sister        SOCIAL HISTORY:  Social History     Social History   • Marital status:      Spouse name: N/A   • Number of children: N/A   • Years of education: N/A     Occupational History   • Not on file.     Social History Main Topics   • Smoking status: Former Smoker     Quit date: 7/5/1999   • Smokeless tobacco: Never Used   • Alcohol use No   • Drug use: No      Comment:    • Sexual activity: No     Other Topics Concern   • Not on file     Social History Narrative   • No narrative on file     ALLERGIES:  Allergies   Allergen Reactions   • Flagyl [Metronidazole Hcl] Anaphylaxis     TOBHX  History   Smoking Status   • Former Smoker   • Quit date: 7/5/1999   Smokeless Tobacco   • Never Used     ALCHX  History  "  Alcohol Use No     DRUGHX  History   Drug Use No     Comment:            MEDICATIONS:  Current Outpatient Prescriptions   Medication   • lisinopril-hydrochlorothiazide (PRINZIDE, ZESTORETIC) 10-12.5 MG per tablet   • cetirizine (ZYRTEC ALLERGY) 10 MG TABS   • famotidine (PEPCID) 20 MG Tab   • ADVAIR DISKUS 250-50 MCG/DOSE AEROSOL POWDER, BREATH ACTIVATED   • famotidine (PEPCID) 10 MG/ML Solution   • aspirin EC (ECOTRIN) 81 MG TBEC   • metaxalone (SKELAXIN) 800 MG TABS     No current facility-administered medications for this visit.        REVIEW OF SYSTEM:    Constitutional: Denies fevers, Denies weight changes   Eyes: Denies changes in vision, no eye pain   Ears/Nose/Throat/Mouth: Denies nasal congestion or sore throat   Cardiovascular: HTN  Respiratory: Denies SOB.   Gastrointestinal/Hepatic: Denies abdominal pain, nausea, vomiting, diarrhea, constipation or GI bleeding   Genitourinary: Denies bladder dysfunction, dysuria or frequency   Musculoskeletal/Rheum: Denies joint pain and swelling   Skin/Breast: Denies rash, denies breast lumps or discharge   Neurological: REM sleep disorder  Psychiatric: denies mood disorder   Endocrine: denies hx of diabetes or thyroid dysfunction   Heme/Oncology/Lymph Nodes: Denies enlarged lymph nodes, denies brusing or known bleeding disorder   Allergic/Immunologic: Denies hx of allergies         PHYSICAL AND NEUROLOGICAL EXMAINATIONS:  VITAL SIGNS: /74 (BP Location: Right arm, Patient Position: Sitting, BP Cuff Size: Adult)   Pulse (!) 57   Temp 36.4 °C (97.6 °F) (Temporal)   Resp 16   Ht 1.803 m (5' 11\")   Wt 91.4 kg (201 lb 6.4 oz)   SpO2 96%   BMI 28.09 kg/m²   CURRENT WEIGHT:   BMI: Body mass index is 28.09 kg/m².  PREVIOUS WEIGHTS:  Wt Readings from Last 25 Encounters:   01/23/19 91.4 kg (201 lb 6.4 oz)   07/23/18 90.6 kg (199 lb 11.2 oz)   01/29/18 73.1 kg (161 lb 3.2 oz)   01/09/18 88.9 kg (196 lb)   07/28/17 90.7 kg (200 lb)   04/19/17 92 kg (202 lb " 12.8 oz)   03/24/17 93.6 kg (206 lb 6.4 oz)   01/25/17 95.7 kg (211 lb)   03/01/16 90.3 kg (199 lb)   01/05/16 91.2 kg (201 lb)   05/20/14 93.9 kg (207 lb)   10/29/13 91.4 kg (201 lb 9.6 oz)   10/22/13 89.4 kg (197 lb 3.2 oz)   08/26/13 90 kg (198 lb 6.4 oz)   07/05/13 88 kg (194 lb)       General appearance of patient: WDWN(+) NAD(+)    EYES  o Fundus : Papilledem(-) Exudates(-) Hemorrhage(-)  Nervous System  Orientation to time, place and person(+)  Memory normal(-)      ________________________________________________________________________      We also asked the patient to copy ArchEnablonean spiral , writing  The patient's work will be scanned into the media section    ________________________________________________________________________    Language: aphasia(-)  Knowledge: past(+) Current(+)  Attention(+)  Cranial Nerves  • Nerve 2: intact  • Nerve 3,4,6: intact  • Nerve 5 : intact  • Nerve 7: intact  • Nerve 8: intact  • Nerve 9 & 10: intact  • Nerve 11: intact  • Nerve 12: intact  Muscle Power and muscle tone: symmetric, normal in upper and lower  Sensory System: Pin sensation intact(+)  Reflexes: symmetric throughout  Cerebellar Function FNP normal   Gait : Steady(+)   Heart and Vascular  Peripheral Vasucular system : Edema (-) Swelling(-)  RHB, Breathing sound clear  abdomen bowel sound normoactive  Extremities freely moveable  Joints no contracture       NEUROIMAGING: I reviewed the MRI/CT of brain       Resting Tremor-- subtle-- L  Bradykinesia---L  Rigidity-- subtle  Postural balance-- fine     Recall 3/3     IMPRESSION:    1. REM sleep disorder for 3 years-- hurting himself during REM sleep disorder for 3 years ( falling out of bed)  2. Probable Parkinson stage I -II-- stable  3. 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 ( not any more), HTN,     PLAN/RECOMMENDATIONS:      Regarding REM sleep disorder-- could adjust Klonopin from 0.5mg to 1 mg before sleep by himself  Please call us if side  effects or not effective    Regarding the probable early parkinsonism, we could observe    The patient is exercising on daily basis    We will see the patient in one year  Please contact us if anything new    We explained the staging of Parkinson Disease    Modified Acosta and Yahr staging     Stage 0 No signs of disease   Stage 1 Unilateral disease   Stage 1.5 Unilateral plus axial involvement   Stage 2 Bilateral disease, without impairment of balance   Stage 2.5 Mild bilateral disease, with recovery on pull test   Stage 3  Mild to moderate bilateral disease; some postural instability; physically independent   Stage 4 Severe disability; still able to walk or stand unassisted   Stage 5 Wheelchair bound or bedridden unless aided           SIGNATURE:  Kj Luz      CC:  Yessenia Thomas M.D.

## 2019-01-23 NOTE — PATIENT INSTRUCTIONS
IMPRESSION:    1. 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 Probable Parkinson stage I -II-- stable  3. 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 ( not any more), HTN,     PLAN/RECOMMENDATIONS:      Regarding REM sleep disorder-- could adjust Klonopin from 0.5mg to 1 mg before sleep by himself  Please call us if side effects or not effective    Regarding the probable early parkinsonism, we could observe    The patient is exercising on daily basis    We will see the patient in one year  Please contact us if anything new    We explained the staging of Parkinson Disease    Modified Acosta and Yahr staging     Stage 0 No signs of disease   Stage 1 Unilateral disease   Stage 1.5 Unilateral plus axial involvement   Stage 2 Bilateral disease, without impairment of balance   Stage 2.5 Mild bilateral disease, with recovery on pull test   Stage 3  Mild to moderate bilateral disease; some postural instability; physically independent   Stage 4 Severe disability; still able to walk or stand unassisted   Stage 5 Wheelchair bound or bedridden unless aided           SIGNATURE:  Kj Luz

## 2019-01-24 ENCOUNTER — TELEPHONE (OUTPATIENT)
Dept: NEUROLOGY | Facility: MEDICAL CENTER | Age: 74
End: 2019-01-24

## 2019-01-24 NOTE — TELEPHONE ENCOUNTER
ClonazePAM 0.5 to 1 mg Oral NIGHTLY # 60 no additional refill PRN [facility] after [facility] took script in and there was confusion about the order. Spoke to [facility] pharmacist.

## 2019-02-20 ENCOUNTER — TELEPHONE (OUTPATIENT)
Dept: MEDICAL GROUP | Facility: PHYSICIAN GROUP | Age: 74
End: 2019-02-20

## 2019-02-21 NOTE — TELEPHONE ENCOUNTER
1. Caller Name: Florin                                          Call Back Number: [phone] (home)        Patient approves a detailed voicemail message: N\A    pt wanted to know if he needed labs done. pt was informed that he has no orders due and if he wants labs ordered or referrals to have them ordered at upcoming appt.

## 2019-02-28 ENCOUNTER — OFFICE VISIT (OUTPATIENT)
Dept: MEDICAL GROUP | Facility: PHYSICIAN GROUP | Age: 74
End: 2019-02-28
Payer: MEDICARE

## 2019-02-28 VITALS
SYSTOLIC BLOOD PRESSURE: 126 MMHG | OXYGEN SATURATION: 96 % | WEIGHT: 203 LBS | BODY MASS INDEX: 28.42 KG/M2 | TEMPERATURE: 98.2 F | HEIGHT: 71 IN | RESPIRATION RATE: 16 BRPM | HEART RATE: 66 BPM | DIASTOLIC BLOOD PRESSURE: 88 MMHG

## 2019-02-28 DIAGNOSIS — J44.9 CHRONIC OBSTRUCTIVE PULMONARY DISEASE, UNSPECIFIED COPD TYPE (HCC): ICD-10-CM

## 2019-02-28 DIAGNOSIS — I10 ESSENTIAL HYPERTENSION: ICD-10-CM

## 2019-02-28 DIAGNOSIS — G47.52 REM SLEEP BEHAVIOR DISORDER: ICD-10-CM

## 2019-02-28 DIAGNOSIS — G20.A1 PARKINSON DISEASE: ICD-10-CM

## 2019-02-28 DIAGNOSIS — R73.01 IMPAIRED FASTING GLUCOSE: ICD-10-CM

## 2019-02-28 PROCEDURE — 99214 OFFICE O/P EST MOD 30 MIN: CPT | Performed by: FAMILY MEDICINE

## 2019-02-28 RX ORDER — LISINOPRIL AND HYDROCHLOROTHIAZIDE 12.5; 1 MG/1; MG/1
TABLET ORAL
Qty: 90 TAB | Refills: 3 | Status: SHIPPED | OUTPATIENT
Start: 2019-02-28 | End: 2020-03-17

## 2019-02-28 NOTE — PROGRESS NOTES
Chief Complaint   Patient presents with   • Hypertension     Lisinopril Management        HISTORY OF PRESENT ILLNESS: Patient is a 73 y.o. male established patient here today for the following concerns:    1. Essential hypertension  Floirn is here today for follow up.  He has hx of HTN and continues on lisinopril-HCTZ with good control of pressures.  He reports that he has not had any CP, SOB, or leg swelling.  Exercising daily.     2. Impaired fasting glucose  Hx of impaired fasting glucose with a1c of 6.0 2 years ago.  Due for updated labs.  Denies any polyuria or polydipsia.  NO neuropathic symptoms.  Has been exercising daily.     3. Parkinson disease (HCC)  4. REM sleep behavior disorder  Diagnosed with parkinson's disease with REM sleep disorder with acting out his dreams and RLS.  He is using clonazepam at night 1/2 to 1 mg with improvement in sleep quality and safety.  He continues to work on exercise as this does help with his rigidity.      5. COPD   Hx of mild COPD, not needing advair, no rescue needed.  No recent URI.  Good exercise tolerance.    Past Medical, Social, and Family history reviewed and updated in EPIC    Allergies:Flagyl [metronidazole hcl]    Current Outpatient Prescriptions   Medication Sig Dispense Refill   • lisinopril-hydrochlorothiazide (PRINZIDE, ZESTORETIC) 10-12.5 MG per tablet TAKE ONE TABLET BY MOUTH ONE TIME DAILY 90 Tab 3   • clonazePAM (KLONOPIN) 1 MG Tab Take 0.5 Tabs by mouth at bedtime as needed for up to 180 days. 60 Tab 1   • metaxalone (SKELAXIN) 800 MG TABS Take 1 Tab by mouth 3 times a day as needed for Mild Pain. 60 Tab 0   • famotidine (PEPCID) 20 MG Tab Take 10 mg by mouth 2 times a day.     • famotidine (PEPCID) 10 MG/ML Solution 20 mg by Intravenous route 2 Times a Day.     • aspirin EC (ECOTRIN) 81 MG TBEC Take 81 mg by mouth every day.     • cetirizine (ZYRTEC ALLERGY) 10 MG TABS Take 10 mg by mouth every day.       No current facility-administered medications  "for this visit.          ROS:  Review of Systems   Constitutional: Negative for fever, chills, weight loss and malaise/fatigue.   HENT: Negative for ear pain, nosebleeds, congestion, sore throat and neck pain.    Eyes: Negative for blurred vision.   Respiratory: Negative for cough, sputum production, shortness of breath and wheezing.    Cardiovascular: Negative for chest pain, palpitations,  and leg swelling.   Gastrointestinal: Negative for heartburn, nausea, vomiting, diarrhea and abdominal pain.   Genitourinary: Negative for dysuria, urgency and frequency.   Musculoskeletal: Negative for myalgias, back pain and joint pain.   Skin: Negative for rash and itching.   Neurological: Negative for dizziness, tingling, tremors, sensory change, focal weakness and headaches.   Endo/Heme/Allergies: Does not bruise/bleed easily.   Psychiatric/Behavioral: Negative for depression, anxiety, suicidal ideas, insomnia and memory loss.      Exam:  Blood pressure 126/88, pulse 66, temperature 36.8 °C (98.2 °F), resp. rate 16, height 1.803 m (5' 11\"), weight 92.1 kg (203 lb), SpO2 96 %.    General:  Well nourished, well developed in NAD  Head is grossly normal.  Neck: Supple without JVD   Pulmonary:  Normal effort.   Cardiovascular: Regular rate  Extremities: no clubbing, cyanosis, or edema.  Psych: affect appropriate      Please note that this dictation was created using voice recognition software. I have made every reasonable attempt to correct obvious errors, but I expect that there are errors of grammar and possibly content that I did not discover before finalizing the note.    Assessment/Plan:  1. Essential hypertension  Continue current antihypertensive medications.   - Comp Metabolic Panel; Future  - Lipid Profile; Future    2. Impaired fasting glucose  Check labs.   - Comp Metabolic Panel; Future  - HEMOGLOBIN A1C; Future    3. Parkinson disease (HCC)  Continue clonazepam at night     4. REM sleep behavior disorder  As " above  Continue exercise.   Follow up with neurology at planned.     5. COPD  Doing well without medication at this time.  Will continue to monitor.     Follow up annually unless needed sooner prn labs

## 2019-04-03 ENCOUNTER — HOSPITAL ENCOUNTER (OUTPATIENT)
Dept: LAB | Facility: MEDICAL CENTER | Age: 74
End: 2019-04-03
Attending: FAMILY MEDICINE
Payer: MEDICARE

## 2019-04-03 DIAGNOSIS — R73.01 IMPAIRED FASTING GLUCOSE: ICD-10-CM

## 2019-04-03 DIAGNOSIS — I10 ESSENTIAL HYPERTENSION: ICD-10-CM

## 2019-04-03 LAB
ALBUMIN SERPL BCP-MCNC: 3.8 G/DL (ref 3.2–4.9)
ALBUMIN/GLOB SERPL: 1.7 G/DL
ALP SERPL-CCNC: 47 U/L (ref 30–99)
ALT SERPL-CCNC: 19 U/L (ref 2–50)
ANION GAP SERPL CALC-SCNC: 7 MMOL/L (ref 0–11.9)
AST SERPL-CCNC: 17 U/L (ref 12–45)
BILIRUB SERPL-MCNC: 1.1 MG/DL (ref 0.1–1.5)
BUN SERPL-MCNC: 17 MG/DL (ref 8–22)
CALCIUM SERPL-MCNC: 8.9 MG/DL (ref 8.5–10.5)
CHLORIDE SERPL-SCNC: 107 MMOL/L (ref 96–112)
CHOLEST SERPL-MCNC: 157 MG/DL (ref 100–199)
CO2 SERPL-SCNC: 28 MMOL/L (ref 20–33)
CREAT SERPL-MCNC: 0.99 MG/DL (ref 0.5–1.4)
EST. AVERAGE GLUCOSE BLD GHB EST-MCNC: 128 MG/DL
FASTING STATUS PATIENT QL REPORTED: NORMAL
GLOBULIN SER CALC-MCNC: 2.2 G/DL (ref 1.9–3.5)
GLUCOSE SERPL-MCNC: 102 MG/DL (ref 65–99)
HBA1C MFR BLD: 6.1 % (ref 0–5.6)
HDLC SERPL-MCNC: 41 MG/DL
LDLC SERPL CALC-MCNC: 89 MG/DL
POTASSIUM SERPL-SCNC: 3.7 MMOL/L (ref 3.6–5.5)
PROT SERPL-MCNC: 6 G/DL (ref 6–8.2)
SODIUM SERPL-SCNC: 142 MMOL/L (ref 135–145)
TRIGL SERPL-MCNC: 136 MG/DL (ref 0–149)

## 2019-04-03 PROCEDURE — 83036 HEMOGLOBIN GLYCOSYLATED A1C: CPT | Mod: GA

## 2019-04-03 PROCEDURE — 80061 LIPID PANEL: CPT

## 2019-04-03 PROCEDURE — 80053 COMPREHEN METABOLIC PANEL: CPT

## 2019-04-03 PROCEDURE — 36415 COLL VENOUS BLD VENIPUNCTURE: CPT

## 2019-04-11 ENCOUNTER — OFFICE VISIT (OUTPATIENT)
Dept: URGENT CARE | Facility: PHYSICIAN GROUP | Age: 74
End: 2019-04-11
Payer: MEDICARE

## 2019-04-11 VITALS
OXYGEN SATURATION: 96 % | DIASTOLIC BLOOD PRESSURE: 82 MMHG | RESPIRATION RATE: 16 BRPM | WEIGHT: 200 LBS | HEART RATE: 64 BPM | BODY MASS INDEX: 27.89 KG/M2 | SYSTOLIC BLOOD PRESSURE: 126 MMHG | TEMPERATURE: 98.9 F

## 2019-04-11 DIAGNOSIS — A08.4 VIRAL GASTROENTERITIS: ICD-10-CM

## 2019-04-11 PROCEDURE — 99214 OFFICE O/P EST MOD 30 MIN: CPT | Performed by: PHYSICIAN ASSISTANT

## 2019-04-11 RX ORDER — ONDANSETRON 4 MG/1
4 TABLET, ORALLY DISINTEGRATING ORAL EVERY 6 HOURS PRN
Qty: 10 TAB | Refills: 0 | Status: SHIPPED | OUTPATIENT
Start: 2019-04-11 | End: 2019-08-23

## 2019-04-11 RX ORDER — ONDANSETRON 4 MG/1
4 TABLET, ORALLY DISINTEGRATING ORAL ONCE
Status: COMPLETED | OUTPATIENT
Start: 2019-04-11 | End: 2019-04-11

## 2019-04-11 RX ADMIN — ONDANSETRON 4 MG: 4 TABLET, ORALLY DISINTEGRATING ORAL at 17:45

## 2019-04-12 NOTE — PROGRESS NOTES
Subjective:      Florin Brooks is a 73 y.o. male who presents with Abdominal Pain (stomach pain,vomiting,diarrhea,x 3 days)    PMH:  has a past medical history of COPD (chronic obstructive pulmonary disease) ([facility]) (7/5/2013); ED (erectile dysfunction) (7/5/2013); Osteoarthritis (7/5/2013); Shingles; and Shortness of breath (7/5/2013).  MEDS:   Current Outpatient Prescriptions:   •  lisinopril-hydrochlorothiazide (PRINZIDE, ZESTORETIC) 10-12.5 MG per tablet, TAKE ONE TABLET BY MOUTH ONE TIME DAILY, Disp: 90 Tab, Rfl: 3  •  clonazePAM (KLONOPIN) 1 MG Tab, Take 0.5 Tabs by mouth at bedtime as needed for up to 180 days., Disp: 60 Tab, Rfl: 1  •  famotidine (PEPCID) 20 MG Tab, Take 10 mg by mouth 2 times a day., Disp: , Rfl:   •  cetirizine (ZYRTEC ALLERGY) 10 MG TABS, Take 10 mg by mouth every day., Disp: , Rfl:   •  famotidine (PEPCID) 10 MG/ML Solution, 20 mg by Intravenous route 2 Times a Day., Disp: , Rfl:   •  aspirin EC (ECOTRIN) 81 MG TBEC, Take 81 mg by mouth every day., Disp: , Rfl:   •  metaxalone (SKELAXIN) 800 MG TABS, Take 1 Tab by mouth 3 times a day as needed for Mild Pain. (Patient not taking: Reported on 4/11/2019), Disp: 60 Tab, Rfl: 0  ALLERGIES:   Allergies   Allergen Reactions   • Flagyl [Metronidazole Hcl] Anaphylaxis     SURGHX:   Past Surgical History:   Procedure Laterality Date   • COLON RESECTION     • EYE SURGERY      muscle surgery   • FOOT SURGERY      neuroma/ganglion cyst   • HERNIA REPAIR     • KNEE ARTHROSCOPY      bilaterally     SOCHX:  reports that he quit smoking about 19 years ago. He has never used smokeless tobacco. He reports that he does not drink alcohol or use drugs.  FH: Reviewed with patient, not pertinent to this visit.           Patient presents with:  Abdominal Pain: stomach pain,vomiting,diarrhea,x 3 days.  Pt thinks it is from something he ate. Pt denies fever, or chills. Pt has been taking otc pepto bismal with a little relief but no  resolution.          Gastroenteritis    This is a new problem. The current episode started in the past 7 days. The problem occurs 2 to 4 times per day. The problem has been unchanged. The emesis has an appearance of stomach contents. There has been no fever. Associated symptoms include abdominal pain (crampy) and diarrhea. Pertinent negatives include no coughing, fever, sweats or weight loss. Risk factors include suspect food intake. He has tried increased fluids and diet change for the symptoms. The treatment provided mild relief.       Review of Systems   Constitutional: Negative for fever and weight loss.   Respiratory: Negative for cough.    Gastrointestinal: Positive for abdominal pain (crampy), diarrhea, heartburn, nausea and vomiting. Negative for blood in stool and constipation.   All other systems reviewed and are negative.         Objective:     /82 (BP Location: Right arm, Patient Position: Sitting, BP Cuff Size: Adult)   Pulse 64   Temp 37.2 °C (98.9 °F) (Temporal)   Resp 16   Wt 90.7 kg (200 lb)   SpO2 96%   BMI 27.89 kg/m²      Physical Exam   Constitutional: He is oriented to person, place, and time. He appears well-developed and well-nourished. No distress.   HENT:   Head: Normocephalic and atraumatic.   Nose: Nose normal.   Mouth/Throat: Oropharynx is clear and moist.   Eyes: Pupils are equal, round, and reactive to light. Conjunctivae and EOM are normal.   Neck: Normal range of motion.   Cardiovascular: Regular rhythm and normal heart sounds.    Pulmonary/Chest: Effort normal and breath sounds normal.   Abdominal: Soft.   Musculoskeletal: Normal range of motion.   Neurological: He is alert and oriented to person, place, and time. Gait normal.   Skin: Skin is warm and dry. Capillary refill takes less than 2 seconds.   Psychiatric: He has a normal mood and affect.   Nursing note and vitals reviewed.         Assessment/Plan:     1. Viral gastroenteritis  ondansetron (ZOFRAN ODT) dispertab  4 mg    ondansetron (ZOFRAN ODT) 4 MG TABLET DISPERSIBLE     PT can continue OTC medications, increase fluids and rest until symptoms improve.     PT to follow clear diet for 8-12 hours, then progress to bland diet for 24 hours, then progress to regular diet as tolerated.     PT should follow up with PCP in 1-2 days for re-evaluation if symptoms have not improved.  Discussed red flags and reasons to return to UC or ED.  Pt and/or family verbalized understanding of diagnosis and follow up instructions and was offered informational handout on diagnosis.  PT discharged.

## 2019-04-15 ASSESSMENT — ENCOUNTER SYMPTOMS
VOMITING: 1
NAUSEA: 1
CONSTIPATION: 0
SWEATS: 0
WEIGHT LOSS: 0
ABDOMINAL PAIN: 1
COUGH: 0
BLOOD IN STOOL: 0
DIARRHEA: 1
HEARTBURN: 1
FEVER: 0

## 2019-04-17 ENCOUNTER — OFFICE VISIT (OUTPATIENT)
Dept: MEDICAL GROUP | Facility: PHYSICIAN GROUP | Age: 74
End: 2019-04-17
Payer: MEDICARE

## 2019-04-17 VITALS
OXYGEN SATURATION: 97 % | SYSTOLIC BLOOD PRESSURE: 118 MMHG | WEIGHT: 196 LBS | DIASTOLIC BLOOD PRESSURE: 80 MMHG | TEMPERATURE: 96.9 F | HEART RATE: 66 BPM | HEIGHT: 71 IN | RESPIRATION RATE: 14 BRPM | BODY MASS INDEX: 27.44 KG/M2

## 2019-04-17 DIAGNOSIS — A08.4 VIRAL GASTROENTERITIS: ICD-10-CM

## 2019-04-17 DIAGNOSIS — G20.A1 PARKINSON DISEASE: ICD-10-CM

## 2019-04-17 PROCEDURE — 99213 OFFICE O/P EST LOW 20 MIN: CPT | Performed by: FAMILY MEDICINE

## 2019-04-17 NOTE — PROGRESS NOTES
Chief Complaint   Patient presents with   • Abdominal Pain     uc fv        HISTORY OF PRESENT ILLNESS: Patient is a 73 y.o. male established patient here today for the following concerns:    1. Viral gastroenteritis  Here for UC follow up.  Last week seen with abdominal cramping, n/v/d.  He was given zofran and reports that slowly ove rthe last week his symptoms have resolved.  Previously had episode of norovirus on a cruise many years ago, and felt identical.  Reports that his appetite is improved.  No further diarrhea and has not thrown up since last week.  He is expanding his diet and getting ready to return to the gym.     2. Parkinson disease (HCC)  Reports he has had more fatigue in the last couple weeks since having the viral gastroenteritis.  He has not been going to the gym which typically has helped him manage the parkinson's.  He is planning to start back today if that is ok by us.      Past Medical, Social, and Family history reviewed and updated in EPIC    Allergies:Flagyl [metronidazole hcl]    Current Outpatient Prescriptions   Medication Sig Dispense Refill   • lisinopril-hydrochlorothiazide (PRINZIDE, ZESTORETIC) 10-12.5 MG per tablet TAKE ONE TABLET BY MOUTH ONE TIME DAILY 90 Tab 3   • clonazePAM (KLONOPIN) 1 MG Tab Take 0.5 Tabs by mouth at bedtime as needed for up to 180 days. 60 Tab 1   • ondansetron (ZOFRAN ODT) 4 MG TABLET DISPERSIBLE Take 1 Tab by mouth every 6 hours as needed for Nausea. (Patient not taking: Reported on 4/17/2019) 10 Tab 0   • famotidine (PEPCID) 20 MG Tab Take 10 mg by mouth 2 times a day.     • famotidine (PEPCID) 10 MG/ML Solution 20 mg by Intravenous route 2 Times a Day.     • aspirin EC (ECOTRIN) 81 MG TBEC Take 81 mg by mouth every day.     • metaxalone (SKELAXIN) 800 MG TABS Take 1 Tab by mouth 3 times a day as needed for Mild Pain. (Patient not taking: Reported on 4/11/2019) 60 Tab 0   • cetirizine (ZYRTEC ALLERGY) 10 MG TABS Take 10 mg by mouth every day.       No  "current facility-administered medications for this visit.          ROS:  Review of Systems   Constitutional: Negative for fever, chills, weight loss and malaise/fatigue.   HENT: Negative for ear pain, nosebleeds, congestion, sore throat and neck pain.    Eyes: Negative for blurred vision.   Respiratory: Negative for cough, sputum production, shortness of breath and wheezing.    Cardiovascular: Negative for chest pain, palpitations,  and leg swelling.   Gastrointestinal: per hpi.   Genitourinary: Negative for dysuria, urgency and frequency.   Musculoskeletal: Negative for myalgias, back pain and joint pain.   Skin: Negative for rash and itching.   Neurological: Negative for dizziness, tingling, tremors, sensory change, focal weakness and headaches.   Endo/Heme/Allergies: Does not bruise/bleed easily.   Psychiatric/Behavioral: Negative for depression, anxiety, suicidal ideas, insomnia and memory loss.      Exam:  /80   Pulse 66   Temp 36.1 °C (96.9 °F)   Resp 14   Ht 1.803 m (5' 11\")   Wt 88.9 kg (196 lb)   SpO2 97%     General:  Well nourished, well developed in NAD  Head is grossly normal.  Neck: Supple without JVD   Pulmonary:  Normal effort.   Cardiovascular: Regular rate  Extremities: no clubbing, cyanosis, or edema.  Psych: affect appropriate      Please note that this dictation was created using voice recognition software. I have made every reasonable attempt to correct obvious errors, but I expect that there are errors of grammar and possibly content that I did not discover before finalizing the note.    Assessment/Plan:  1. Viral gastroenteritis  Resolved, liberalize diet  UC records reviewed  Report any return or new onset of symptoms    2. Parkinson disease (HCC)  Return to the gym ok    Follow up in 3-6 months, sooner prn.         "

## 2019-08-05 ENCOUNTER — TELEPHONE (OUTPATIENT)
Dept: MEDICAL GROUP | Facility: PHYSICIAN GROUP | Age: 74
End: 2019-08-05
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
## 2019-08-23 ENCOUNTER — OFFICE VISIT (OUTPATIENT)
Dept: MEDICAL GROUP | Facility: PHYSICIAN GROUP | Age: 74
End: 2019-08-23
Payer: MEDICARE

## 2019-08-23 VITALS
HEIGHT: 71 IN | BODY MASS INDEX: 25.76 KG/M2 | SYSTOLIC BLOOD PRESSURE: 122 MMHG | WEIGHT: 184 LBS | TEMPERATURE: 97.7 F | DIASTOLIC BLOOD PRESSURE: 70 MMHG | OXYGEN SATURATION: 98 % | HEART RATE: 60 BPM | RESPIRATION RATE: 16 BRPM
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
## 2019-08-23 DIAGNOSIS — M79.604 BILATERAL LEG PAIN: ICD-10-CM

## 2019-08-23 DIAGNOSIS — F43.0 ACUTE STRESS REACTION: ICD-10-CM

## 2019-08-23 DIAGNOSIS — M79.605 BILATERAL LEG PAIN: ICD-10-CM

## 2019-08-23 DIAGNOSIS — M54.2 NECK PAIN: ICD-10-CM

## 2019-08-23 DIAGNOSIS — M25.512 ACUTE PAIN OF LEFT SHOULDER: ICD-10-CM

## 2019-08-23 PROCEDURE — 99214 OFFICE O/P EST MOD 30 MIN: CPT | Performed by: FAMILY MEDICINE
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
## 2019-08-23 NOTE — LETTER
formerly [facility]  Yessenia Thomas M.D.  [address]  [place] [address]  Fax: [phone]   Authorization for Release/Disclosure of   Protected Health Information   Name: ELINOR BROOKS : 1945 SSN: [id number]   Address: [address]  [place] [address] Phone:    [phone] (home)    I authorize the entity listed below to release/disclose the PHI below to:   formerly [facility]/Yessenia Thomas M.D. and Yessenia Thomas M.D.   Provider or Entity Name:[facility]     Address   City, [facility], [facility]   Phone:    Fax:[phone]   Reason for request: continuity of care   Information to be released:    [  ] LAST COLONOSCOPY,  including any PATH REPORT and follow-up  [  ] LAST FIT/COLOGUARD RESULT [  ] LAST DEXA  [  ] LAST MAMMOGRAM  [  ] LAST PAP  [  ] LAST LABS [  ] RETINA EXAM REPORT  [  ] IMMUNIZATION RECORDS  [ X ] Release all info  Last ER stay          DATES OF SERVICE OR TIME PERIOD TO BE DISCLOSED: _____________  I understand and acknowledge that:  * This Authorization may be revoked at any time by you in writing, except if your health information has already been used or disclosed.  * Your health information that will be used or disclosed as a result of you signing this authorization could be re-disclosed by the recipient. If this occurs, your re-disclosed health information may no longer be protected by State or Federal laws.  * You may refuse to sign this Authorization. Your refusal will not affect your ability to obtain treatment.  * This Authorization becomes effective upon signing and will  on (date) __________.      If no date is indicated, this Authorization will  one (1) year from the signature date.    Name: Elinor Brooks    Signature:continuity of care   Date:     2019       PLEASE FAX REQUESTED RECORDS BACK TO: [phone]

## 2019-08-27 ENCOUNTER — TELEPHONE (OUTPATIENT)
Dept: MEDICAL GROUP | Facility: PHYSICIAN GROUP | Age: 74
End: 2019-08-27

## 2019-08-27 NOTE — TELEPHONE ENCOUNTER
VOICEMAIL  1. Caller Name: Florin                       Call Back Number: [phone] (home)      2. Message: Im supposed to be seen for PT but I dont know if I am supposed to make an appt or you are. Please call me back     3. Patient approves office to leave a detailed voicemail/MyChart message: N\A

## 2019-08-27 NOTE — TELEPHONE ENCOUNTER
Referral information sent to the following:     Physical Therapy     [facility] [address]  [place] [address]  [phone]        Pt notified.

## 2019-09-04 ENCOUNTER — PHYSICAL THERAPY (OUTPATIENT)
Dept: PHYSICAL THERAPY | Facility: REHABILITATION | Age: 74
End: 2019-09-04
Attending: FAMILY MEDICINE
Payer: MEDICARE

## 2019-09-04 DIAGNOSIS — M54.2 NECK PAIN: ICD-10-CM

## 2019-09-04 DIAGNOSIS — M79.604 BILATERAL LEG PAIN: ICD-10-CM

## 2019-09-04 DIAGNOSIS — M79.605 BILATERAL LEG PAIN: ICD-10-CM

## 2019-09-04 DIAGNOSIS — M25.512 ACUTE PAIN OF LEFT SHOULDER: ICD-10-CM
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
## 2019-09-04 PROCEDURE — 97161 PT EVAL LOW COMPLEX 20 MIN: CPT

## 2019-09-04 PROCEDURE — 97110 THERAPEUTIC EXERCISES: CPT

## 2019-09-04 ASSESSMENT — ENCOUNTER SYMPTOMS
PAIN SCALE AT HIGHEST: 10
QUALITY: ACHING
PAIN TIMING: WHEN ACTIVE
PAIN SCALE: 7
PAIN SCALE AT LOWEST: 6
AWAKENINGS PER NIGHT: 3
ALLEVIATING FACTORS: ICE

## 2019-09-04 ASSESSMENT — ACTIVITIES OF DAILY LIVING (ADL): POOR_BALANCE: 1
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
## 2019-09-25 ENCOUNTER — PHYSICAL THERAPY (OUTPATIENT)
Dept: PHYSICAL THERAPY | Facility: REHABILITATION | Age: 74
End: 2019-09-25
Attending: FAMILY MEDICINE
Payer: MEDICARE

## 2019-09-25 DIAGNOSIS — M79.605 BILATERAL LEG PAIN: ICD-10-CM

## 2019-09-25 DIAGNOSIS — M79.604 BILATERAL LEG PAIN: ICD-10-CM

## 2019-09-25 DIAGNOSIS — M54.2 NECK PAIN: ICD-10-CM

## 2019-09-25 DIAGNOSIS — M25.512 ACUTE PAIN OF LEFT SHOULDER: ICD-10-CM
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
## 2019-09-25 PROCEDURE — 97110 THERAPEUTIC EXERCISES: CPT
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
## 2019-09-27 ENCOUNTER — APPOINTMENT (OUTPATIENT)
Dept: PHYSICAL THERAPY | Facility: REHABILITATION | Age: 74
End: 2019-09-27
Payer: MEDICARE

## 2019-09-27 ENCOUNTER — PHYSICAL THERAPY (OUTPATIENT)
Dept: PHYSICAL THERAPY | Facility: REHABILITATION | Age: 74
End: 2019-09-27
Attending: FAMILY MEDICINE
Payer: MEDICARE

## 2019-09-27 DIAGNOSIS — M25.512 ACUTE PAIN OF LEFT SHOULDER: ICD-10-CM
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
## 2019-09-27 DIAGNOSIS — M54.2 NECK PAIN: ICD-10-CM

## 2019-09-27 DIAGNOSIS — M79.605 BILATERAL LEG PAIN: ICD-10-CM

## 2019-09-27 DIAGNOSIS — M79.604 BILATERAL LEG PAIN: ICD-10-CM

## 2019-09-27 PROCEDURE — 97112 NEUROMUSCULAR REEDUCATION: CPT

## 2019-09-27 PROCEDURE — 97110 THERAPEUTIC EXERCISES: CPT
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
## 2019-10-02 ENCOUNTER — PHYSICAL THERAPY (OUTPATIENT)
Dept: PHYSICAL THERAPY | Facility: REHABILITATION | Age: 74
End: 2019-10-02
Attending: FAMILY MEDICINE
Payer: MEDICARE

## 2019-10-02 ENCOUNTER — APPOINTMENT (OUTPATIENT)
Dept: PHYSICAL THERAPY | Facility: REHABILITATION | Age: 74
End: 2019-10-02
Payer: MEDICARE

## 2019-10-02 DIAGNOSIS — M54.2 NECK PAIN: ICD-10-CM
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
## 2019-10-02 DIAGNOSIS — M79.605 BILATERAL LEG PAIN: ICD-10-CM

## 2019-10-02 DIAGNOSIS — M25.512 ACUTE PAIN OF LEFT SHOULDER: ICD-10-CM

## 2019-10-02 DIAGNOSIS — M79.604 BILATERAL LEG PAIN: ICD-10-CM

## 2019-10-02 PROCEDURE — 97112 NEUROMUSCULAR REEDUCATION: CPT

## 2019-10-04 ENCOUNTER — APPOINTMENT (OUTPATIENT)
Dept: PHYSICAL THERAPY | Facility: REHABILITATION | Age: 74
End: 2019-10-04
Payer: MEDICARE

## 2019-10-04 ENCOUNTER — APPOINTMENT (OUTPATIENT)
Dept: PHYSICAL THERAPY | Facility: REHABILITATION | Age: 74
End: 2019-10-04
Attending: FAMILY MEDICINE
Payer: MEDICARE

## 2019-10-09 ENCOUNTER — PHYSICAL THERAPY (OUTPATIENT)
Dept: PHYSICAL THERAPY | Facility: REHABILITATION | Age: 74
End: 2019-10-09
Attending: FAMILY MEDICINE
Payer: MEDICARE

## 2019-10-09 ENCOUNTER — APPOINTMENT (OUTPATIENT)
Dept: PHYSICAL THERAPY | Facility: REHABILITATION | Age: 74
End: 2019-10-09
Payer: MEDICARE

## 2019-10-09 DIAGNOSIS — M54.2 NECK PAIN: ICD-10-CM

## 2019-10-09 DIAGNOSIS — M25.512 ACUTE PAIN OF LEFT SHOULDER: ICD-10-CM
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
## 2019-10-09 DIAGNOSIS — M79.604 BILATERAL LEG PAIN: ICD-10-CM

## 2019-10-09 DIAGNOSIS — M79.605 BILATERAL LEG PAIN: ICD-10-CM

## 2019-10-09 PROCEDURE — 97112 NEUROMUSCULAR REEDUCATION: CPT

## 2019-10-09 PROCEDURE — 97110 THERAPEUTIC EXERCISES: CPT
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
## 2019-10-11 ENCOUNTER — APPOINTMENT (OUTPATIENT)
Dept: PHYSICAL THERAPY | Facility: REHABILITATION | Age: 74
End: 2019-10-11
Attending: FAMILY MEDICINE
Payer: MEDICARE

## 2019-10-11 ENCOUNTER — APPOINTMENT (OUTPATIENT)
Dept: PHYSICAL THERAPY | Facility: REHABILITATION | Age: 74
End: 2019-10-11
Payer: MEDICARE

## 2019-10-16 ENCOUNTER — PHYSICAL THERAPY (OUTPATIENT)
Dept: PHYSICAL THERAPY | Facility: REHABILITATION | Age: 74
End: 2019-10-16
Attending: FAMILY MEDICINE
Payer: MEDICARE

## 2019-10-16 ENCOUNTER — APPOINTMENT (OUTPATIENT)
Dept: PHYSICAL THERAPY | Facility: REHABILITATION | Age: 74
End: 2019-10-16
Payer: MEDICARE

## 2019-10-16 DIAGNOSIS — M54.2 NECK PAIN: ICD-10-CM

## 2019-10-16 DIAGNOSIS — M25.512 ACUTE PAIN OF LEFT SHOULDER: ICD-10-CM

## 2019-10-16 DIAGNOSIS — M79.605 BILATERAL LEG PAIN: ICD-10-CM
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
## 2019-10-16 DIAGNOSIS — M79.604 BILATERAL LEG PAIN: ICD-10-CM

## 2019-10-16 PROCEDURE — 97112 NEUROMUSCULAR REEDUCATION: CPT

## 2019-10-16 NOTE — OP THERAPY DAILY TREATMENT
Outpatient Physical Therapy  DAILY TREATMENT     [facility] Physical Therapy [address]  [address]  [place] [address]  Phone:  [phone]  Fax:  [phone]    Date: 10/16/2019    Patient: Florin Brooks  YOB: 1945  MRN: [id number]     Time Calculation  Start time: 0900  Stop time: 0928 Time Calculation (min): 28 minutes       Chief Complaint: Loss Of Balance    Visit #: 6    SUBJECTIVE:  Had appt with Dr. Luz yesterday.  He was very happy with how PD progression has been maintained through exercise. No med changes.     OBJECTIVE:      Therapeutic Treatments and Modalities:     1. Neuromuscular Re-education (CPT 27888)    Therapeutic Treatment and Modalities Summary:   - MCTSIB and LOS performed with result reviewed as above  - Review of just right challenge for HEP: to cont focus on compliant surfaces.     Time-based treatments/modalities:  Neuromusc re-ed, balance, coor, post minutes (CPT 31737): 28 minutes       ASSESSMENT:   Response to treatment: See d/c note    PLAN/RECOMMENDATIONS:   Plan for treatment: no further treatment needed.
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
## 2019-10-18 ENCOUNTER — APPOINTMENT (OUTPATIENT)
Dept: PHYSICAL THERAPY | Facility: REHABILITATION | Age: 74
End: 2019-10-18
Attending: FAMILY MEDICINE
Payer: MEDICARE

## 2020-07-15 RX ORDER — LISINOPRIL AND HYDROCHLOROTHIAZIDE 12.5; 1 MG/1; MG/1
TABLET ORAL
Qty: 30 TAB | Refills: 0 | Status: SHIPPED | OUTPATIENT
Start: 2020-07-15 | End: 2020-09-16 | Stop reason: SDUPTHER

## 2020-09-16 ENCOUNTER — OFFICE VISIT (OUTPATIENT)
Dept: MEDICAL GROUP | Facility: PHYSICIAN GROUP | Age: 75
End: 2020-09-16
Payer: MEDICARE

## 2020-09-16 VITALS
DIASTOLIC BLOOD PRESSURE: 82 MMHG | OXYGEN SATURATION: 99 % | SYSTOLIC BLOOD PRESSURE: 128 MMHG | WEIGHT: 188.4 LBS | TEMPERATURE: 98.6 F | RESPIRATION RATE: 12 BRPM | BODY MASS INDEX: 26.38 KG/M2 | HEART RATE: 70 BPM | HEIGHT: 71 IN

## 2020-09-16 DIAGNOSIS — G47.52 REM SLEEP BEHAVIOR DISORDER: ICD-10-CM

## 2020-09-16 DIAGNOSIS — I10 ESSENTIAL HYPERTENSION: ICD-10-CM

## 2020-09-16 DIAGNOSIS — G20.A1 PARKINSON DISEASE (HCC): ICD-10-CM

## 2020-09-16 DIAGNOSIS — R97.20 ELEVATED PSA: ICD-10-CM

## 2020-09-16 PROCEDURE — 99214 OFFICE O/P EST MOD 30 MIN: CPT | Performed by: INTERNAL MEDICINE

## 2020-09-16 RX ORDER — LISINOPRIL AND HYDROCHLOROTHIAZIDE 12.5; 1 MG/1; MG/1
TABLET ORAL
Qty: 30 TAB | Refills: 5 | Status: SHIPPED | OUTPATIENT
Start: 2020-09-16 | End: 2021-03-16

## 2020-09-16 ASSESSMENT — PATIENT HEALTH QUESTIONNAIRE - PHQ9: CLINICAL INTERPRETATION OF PHQ2 SCORE: 0

## 2020-09-16 NOTE — PROGRESS NOTES
"CC: Medication refills      HPI:   Florin presents today with the following.  Current patient of Dr. Yessenia Thomas.    1. Essential hypertension  Requesting refills on his blood pressure medication, Prinzide 10/12.5 mg p.o. daily.  Patient does not regularly check blood pressures.  Patient feels good, denies chest pain, headache, dizziness, shortness of breath, palpitations or edema.  Patient states that he \"does not worry about his blood pressure because he has Parkinson's disease\".    2. Parkinson disease (HCC)  Patient is stable and is currently followed by his neurologist Dr. Luz.  He has a follow-up appointment this Friday.  Patient exercises daily and functions quite well.  Minimal tremors.  Declines medications at this time.    3. REM sleep behavior disorder  Patient discontinued clonazepam and now uses CBD oil at nighttime.    4. Elevated PSA  Elevated PSA level greater than 5.0 2016.  Patient did have a biopsy at one point which was negative for cancer.    Patient completed most recent lab work in April 2019.  Declines at this time.      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       No current facility-administered medications for this visit.          Allergies as of 09/16/2020 - Reviewed 09/16/2020   Allergen Reaction Noted   • Flagyl [metronidazole hcl] Anaphylaxis 07/05/2013        ROS: As per HPI. " " Minimal tremors of upper extremity bilaterally.  Denies all other cardiopulmonary, GI, neurologic symptoms.    /82 (BP Location: Left arm, Patient Position: Sitting, BP Cuff Size: Adult)   Pulse 70   Temp 37 °C (98.6 °F) (Temporal)   Resp 12   Ht 1.803 m (5' 11\")   Wt 85.5 kg (188 lb 6.4 oz)   SpO2 99%   BMI 26.28 kg/m²     Physical Exam:  Gen:         Alert and oriented, No apparent distress.  Neck:        No Lymphadenopathy or Bruits.  Neck is supple.  Lungs:     Clear to auscultation bilaterally no wheezing rhonchi or crackles  CV:          Regular rate and rhythm. No murmurs, rubs or gallops.  Abd:         Soft non tender, non distended. Normal active bowel sounds.  No  Hepatosplenomegaly, No pulsatile masses.                   Ext:          No clubbing, cyanosis, edema.      Assessment and Plan.   74 y.o. male with the following issues.    1. Essential hypertension  Refill Prinzide, #30, 5 refills.  Stable, follow-up with PCP for additional refills    2. Parkinson disease (HCC)  Stable, keep follow-up with neurology    3. REM sleep behavior disorder  CBD oil nightly    4. Elevated PSA  Discussed with patient    Patient will make follow-up appointment for his annual visit with his PCP.        Please note that this dictation was created using voice recognition software. I have made every reasonable attempt to correct obvious errors, but expect that there are errors of grammar and possible content that I did not discover before finalizing note.   "

## 2021-01-11 DIAGNOSIS — Z23 NEED FOR VACCINATION: ICD-10-CM

## 2021-01-15 ENCOUNTER — IMMUNIZATION (OUTPATIENT)
Dept: FAMILY PLANNING/WOMEN'S HEALTH CLINIC | Facility: IMMUNIZATION CENTER | Age: 76
End: 2021-01-15
Attending: INTERNAL MEDICINE
Payer: MEDICARE

## 2021-01-15 DIAGNOSIS — Z23 NEED FOR VACCINATION: ICD-10-CM

## 2021-01-15 DIAGNOSIS — Z23 ENCOUNTER FOR VACCINATION: Primary | ICD-10-CM

## 2021-01-15 PROCEDURE — 91300 PFIZER SARS-COV-2 VACCINE: CPT

## 2021-01-15 PROCEDURE — 0001A PFIZER SARS-COV-2 VACCINE: CPT

## 2021-02-05 ENCOUNTER — IMMUNIZATION (OUTPATIENT)
Dept: FAMILY PLANNING/WOMEN'S HEALTH CLINIC | Facility: IMMUNIZATION CENTER | Age: 76
End: 2021-02-05
Attending: INTERNAL MEDICINE
Payer: MEDICARE

## 2021-02-05 DIAGNOSIS — Z23 ENCOUNTER FOR VACCINATION: Primary | ICD-10-CM

## 2021-02-05 PROCEDURE — 91300 PFIZER SARS-COV-2 VACCINE: CPT | Performed by: NURSE PRACTITIONER

## 2021-02-05 PROCEDURE — 0002A PFIZER SARS-COV-2 VACCINE: CPT | Performed by: NURSE PRACTITIONER

## 2021-03-16 RX ORDER — LISINOPRIL AND HYDROCHLOROTHIAZIDE 12.5; 1 MG/1; MG/1
TABLET ORAL
Qty: 90 TABLET | Refills: 0 | Status: SHIPPED | OUTPATIENT
Start: 2021-03-16 | End: 2021-03-30 | Stop reason: SDUPTHER

## 2021-03-30 ENCOUNTER — OFFICE VISIT (OUTPATIENT)
Dept: MEDICAL GROUP | Facility: PHYSICIAN GROUP | Age: 76
End: 2021-03-30
Payer: MEDICARE

## 2021-03-30 VITALS
SYSTOLIC BLOOD PRESSURE: 124 MMHG | BODY MASS INDEX: 27.58 KG/M2 | DIASTOLIC BLOOD PRESSURE: 80 MMHG | HEIGHT: 71 IN | WEIGHT: 197 LBS | HEART RATE: 62 BPM | OXYGEN SATURATION: 99 % | RESPIRATION RATE: 20 BRPM | TEMPERATURE: 97.5 F

## 2021-03-30 DIAGNOSIS — Z91.09 ENVIRONMENTAL ALLERGIES: ICD-10-CM

## 2021-03-30 DIAGNOSIS — Z00.00 WELLNESS EXAMINATION: ICD-10-CM

## 2021-03-30 DIAGNOSIS — G20.A1 PARKINSON DISEASE (HCC): ICD-10-CM

## 2021-03-30 DIAGNOSIS — R73.03 PREDIABETES: ICD-10-CM

## 2021-03-30 DIAGNOSIS — Z11.59 NEED FOR HEPATITIS C SCREENING TEST: ICD-10-CM

## 2021-03-30 DIAGNOSIS — I10 ESSENTIAL HYPERTENSION: ICD-10-CM

## 2021-03-30 PROCEDURE — 99214 OFFICE O/P EST MOD 30 MIN: CPT | Performed by: NURSE PRACTITIONER

## 2021-03-30 RX ORDER — LISINOPRIL AND HYDROCHLOROTHIAZIDE 12.5; 1 MG/1; MG/1
1 TABLET ORAL DAILY
Qty: 90 TABLET | Refills: 3 | Status: SHIPPED | OUTPATIENT
Start: 2021-03-30 | End: 2022-06-07

## 2021-03-30 ASSESSMENT — PATIENT HEALTH QUESTIONNAIRE - PHQ9: CLINICAL INTERPRETATION OF PHQ2 SCORE: 0

## 2021-03-30 NOTE — ASSESSMENT & PLAN NOTE
Chronic and ongoing. Currently seeing Dr. Luz, Neurologist. Was taking Clonazepam, but he said he did not like the way it made him feel. He has now tried CBD and he says that it is working.

## 2021-03-30 NOTE — PROGRESS NOTES
"Subjective  Chief Complaint  Establish care to manage her chronic conditions    History of Present Illness  Florin Brooks is a 75 y.o. male. This patient is here today to establish care.  Her prior PCP was Dr. Yessenia Thomas.    Parkinson disease (CMS-HCC)  Chronic and ongoing. Currently seeing Dr. Luz, Neurologist. Was taking Clonazepam, but he said he did not like the way it made him feel. He has now tried CBD and he says that it is working.    HTN (hypertension)  Currently taking Lisinopril-Hydrochlorothiazide 10-12.5 mg daily as directed.   Reports diet is \"okay\".   He is not following a low-salt diet.  He is not exercising regularly.  He is not taking baby aspirin daily.   He is not monitoring BP at home.   Denies symptoms low BP: light-headed, tunnel-vision, unusual fatigue, dizziness.  Denies symptoms high BP: pounding headache, visual changes, palpitations, flushed face.   Denies chest pain, shortness of breath, dyspnea on exertion.   Denies medicine side effects: unusual fatigue, slow heartbeat, foot/leg swelling, cough.    Prediabetes  Chronic and stable. Currently is not working on his diet or exercise. Due for updated lab work.     9/23/2016 06:28 11/7/2017 06:39 4/3/2019 06:14   Glycohemoglobin 6.1 (H) 6.0 (H) 6.1 (H)   Estim. Avg Glu 128 126 128       Environmental allergies  Chronic and ongoing. Takes Cetirizine 10 mg every other day. He says his allergies are more in the summer and less in the winter.    Past Medical History    Allergies: Flagyl [metronidazole hcl]  Past Medical History:   Diagnosis Date   • COPD (chronic obstructive pulmonary disease) ([facility]) 7/5/2013   • ED (erectile dysfunction) 7/5/2013   • Osteoarthritis 7/5/2013   • Shingles    • Shortness of breath 7/5/2013     Past Surgical History:   Procedure Laterality Date   • COLON RESECTION     • EYE SURGERY      muscle surgery   • FOOT SURGERY      neuroma/ganglion cyst   • HERNIA REPAIR     • KNEE ARTHROSCOPY      bilaterally " "    Current Outpatient Medications Ordered in Epic   Medication Sig Dispense Refill   • lisinopril-hydrochlorothiazide (PRINZIDE) 10-12.5 MG per tablet Take 1 tablet by mouth every day. 90 tablet 3   • cetirizine (ZYRTEC ALLERGY) 10 MG TABS Take 10 mg by mouth every day.       No current [facility]-ordered facility-administered medications on file.     Family History:    Family History   Problem Relation Age of Onset   • Alcohol/Drug Father    • Lung Disease Sister    • Cancer Neg Hx    • Diabetes Neg Hx    • Stroke Neg Hx       Personal/Social History:    Social History     Tobacco Use   • Smoking status: Former Smoker     Quit date: 1999     Years since quittin.7   • Smokeless tobacco: Never Used   Substance Use Topics   • Alcohol use: No     Alcohol/week: 0.0 oz   • Drug use: No     Comment:      Social History     Social History Narrative   • Not on file      Review of Systems:     General: Negative for fever/chills and unexpected weight change.    Eyes:  Negative for vision changes, eye pain.   ENT:  Negative for hearing changes, ear pain, congestion, sore throat, and neck pain.    Respiratory:  Negative for cough and dyspnea.     Cardiovascular:  Negative for chest pain and palpitations.   Gastrointestinal:  Negative for nausea/vomiting, changes in bowel habits, and abdominal pain.    Genitourinary:  Negative for dysuria and hematuria.    Musculoskeletal:  Negative for myalgias.    Skin:  Negative for rash.    Neurological:  Negative for numbness/tingling and headaches.    Heme/Lymph:  Does not bruise/bleed easily.     Objective  Physical Exam:   /80 (BP Location: Right arm, Patient Position: Sitting, BP Cuff Size: Adult)   Pulse 62   Temp 36.4 °C (97.5 °F) (Temporal)   Resp 20   Ht 1.803 m (5' 11\")   Wt 89.4 kg (197 lb)   SpO2 99%  Body mass index is 27.48 kg/m².  General:  Alert and oriented.  Well appearing.  NAD.  Head:  Normocephalic.   Neck: Supple without JVD. No " lymphadenopathy.  Pulmonary:  Normal effort.  Clear to ausculation without rales, ronchi, or wheezing.  Cardiovascular:  Regular rate and rhythm without murmur, rubs or gallop.  Radial pulses are intact and equal bilaterally.  Musculoskeletal:  No extremity cyanosis, clubbing, or edema.  Skin:  Warm and dry.  No obvious lesions.  Psych: Normal mood and affect. Alert and oriented x3. Judgment and insight is normal.      Assessment/Plan   1. Parkinson disease (HCC)  Chronic and ongoing.  Currently taking CBD which his Neurologist, Dr. Luz has approved.  Continue to follow up with Dr. Luz.    2. Essential hypertension  Chronic and stable.  Continue to take Lisinopril-Hydrochlorothiazide 10-12.5 mg daily.  Educated to check blood pressure at home.  Educated on a healthy diet and exercise.  - lisinopril-hydrochlorothiazide (PRINZIDE) 10-12.5 MG per tablet; Take 1 tablet by mouth every day.  Dispense: 90 tablet; Refill: 3    3. Prediabetes  Chronic and stable.  Educated on a healthy diet and exercise.  Due for updated lab work.  - HEMOGLOBIN A1C; Future    4. Environmental allergies  Chronic and ongoing.  Continue to take Cetirizine 10 mg daily.    5. Wellness examination  Due for updated labs.  - Comp Metabolic Panel; Future  - HEMOGLOBIN A1C; Future  - Lipid Profile; Future    6. Need for hepatitis C screening test  - HEP C VIRUS ANTIBODY; Future      Health Maintenance: Completed    Return in about 1 year (around 3/30/2022), or if symptoms worsen or fail to improve.    Please note that this dictation was created using voice recognition software. I have made every reasonable attempt to correct obvious errors, but I expect that there are errors of grammar and possibly content that I did not discover before finalizing the note.    BENOIT Duenas  [facility]

## 2021-03-30 NOTE — ASSESSMENT & PLAN NOTE
Chronic and ongoing. Takes Cetirizine 10 mg every other day. He says his allergies are more in the summer and less in the winter.

## 2021-03-30 NOTE — ASSESSMENT & PLAN NOTE
"Currently taking Lisinopril-Hydrochlorothiazide 10-12.5 mg daily as directed.   Reports diet is \"okay\".   He is not following a low-salt diet.  He is not exercising regularly.  He is not taking baby aspirin daily.   He is not monitoring BP at home.   Denies symptoms low BP: light-headed, tunnel-vision, unusual fatigue, dizziness.  Denies symptoms high BP: pounding headache, visual changes, palpitations, flushed face.   Denies chest pain, shortness of breath, dyspnea on exertion.   Denies medicine side effects: unusual fatigue, slow heartbeat, foot/leg swelling, cough.  "

## 2021-03-30 NOTE — ASSESSMENT & PLAN NOTE
Chronic and stable. Currently is not working on his diet or exercise. Due for updated lab work.     9/23/2016 06:28 11/7/2017 06:39 4/3/2019 06:14   Glycohemoglobin 6.1 (H) 6.0 (H) 6.1 (H)   Estim. Avg Glu 128 126 128

## 2021-04-22 ENCOUNTER — HOSPITAL ENCOUNTER (OUTPATIENT)
Dept: LAB | Facility: MEDICAL CENTER | Age: 76
End: 2021-04-22
Attending: NURSE PRACTITIONER
Payer: MEDICARE

## 2021-04-22 DIAGNOSIS — R73.03 PREDIABETES: ICD-10-CM

## 2021-04-22 DIAGNOSIS — Z11.59 NEED FOR HEPATITIS C SCREENING TEST: ICD-10-CM

## 2021-04-22 DIAGNOSIS — Z00.00 WELLNESS EXAMINATION: ICD-10-CM

## 2021-04-22 PROCEDURE — 83036 HEMOGLOBIN GLYCOSYLATED A1C: CPT | Mod: GA

## 2021-04-22 PROCEDURE — 86803 HEPATITIS C AB TEST: CPT

## 2021-04-22 PROCEDURE — 80061 LIPID PANEL: CPT | Mod: GA

## 2021-04-22 PROCEDURE — 80053 COMPREHEN METABOLIC PANEL: CPT

## 2021-04-22 PROCEDURE — 36415 COLL VENOUS BLD VENIPUNCTURE: CPT | Mod: GA

## 2021-04-23 DIAGNOSIS — R76.8 POSITIVE HEPATITIS C ANTIBODY TEST: ICD-10-CM

## 2021-04-23 LAB
ALBUMIN SERPL BCP-MCNC: 4.3 G/DL (ref 3.2–4.9)
ALBUMIN/GLOB SERPL: 1.7 G/DL
ALP SERPL-CCNC: 70 U/L (ref 30–99)
ALT SERPL-CCNC: 20 U/L (ref 2–50)
ANION GAP SERPL CALC-SCNC: 8 MMOL/L (ref 7–16)
AST SERPL-CCNC: 23 U/L (ref 12–45)
BILIRUB SERPL-MCNC: 1 MG/DL (ref 0.1–1.5)
BUN SERPL-MCNC: 15 MG/DL (ref 8–22)
CALCIUM SERPL-MCNC: 9.1 MG/DL (ref 8.5–10.5)
CHLORIDE SERPL-SCNC: 106 MMOL/L (ref 96–112)
CHOLEST SERPL-MCNC: 168 MG/DL (ref 100–199)
CO2 SERPL-SCNC: 28 MMOL/L (ref 20–33)
CREAT SERPL-MCNC: 0.87 MG/DL (ref 0.5–1.4)
EST. AVERAGE GLUCOSE BLD GHB EST-MCNC: 114 MG/DL
FASTING STATUS PATIENT QL REPORTED: NORMAL
GLOBULIN SER CALC-MCNC: 2.5 G/DL (ref 1.9–3.5)
GLUCOSE SERPL-MCNC: 104 MG/DL (ref 65–99)
HBA1C MFR BLD: 5.6 % (ref 4–5.6)
HCV AB SER QL: REACTIVE
HDLC SERPL-MCNC: 44 MG/DL
LDLC SERPL CALC-MCNC: 102 MG/DL
POTASSIUM SERPL-SCNC: 3.8 MMOL/L (ref 3.6–5.5)
PROT SERPL-MCNC: 6.8 G/DL (ref 6–8.2)
SODIUM SERPL-SCNC: 142 MMOL/L (ref 135–145)
TRIGL SERPL-MCNC: 109 MG/DL (ref 0–149)

## 2021-11-30 ENCOUNTER — OFFICE VISIT (OUTPATIENT)
Dept: MEDICAL GROUP | Facility: PHYSICIAN GROUP | Age: 76
End: 2021-11-30
Payer: MEDICARE

## 2021-11-30 VITALS
HEART RATE: 86 BPM | TEMPERATURE: 97.6 F | SYSTOLIC BLOOD PRESSURE: 138 MMHG | OXYGEN SATURATION: 99 % | WEIGHT: 185 LBS | HEIGHT: 71 IN | BODY MASS INDEX: 25.9 KG/M2 | RESPIRATION RATE: 20 BRPM | DIASTOLIC BLOOD PRESSURE: 78 MMHG

## 2021-11-30 DIAGNOSIS — I10 PRIMARY HYPERTENSION: ICD-10-CM

## 2021-11-30 DIAGNOSIS — G20.A1 PARKINSON DISEASE (HCC): ICD-10-CM

## 2021-11-30 DIAGNOSIS — Z00.00 MEDICARE ANNUAL WELLNESS VISIT, SUBSEQUENT: Primary | ICD-10-CM

## 2021-11-30 DIAGNOSIS — J44.9 CHRONIC OBSTRUCTIVE PULMONARY DISEASE, UNSPECIFIED COPD TYPE (HCC): ICD-10-CM

## 2021-11-30 PROCEDURE — G0439 PPPS, SUBSEQ VISIT: HCPCS | Performed by: NURSE PRACTITIONER

## 2021-11-30 NOTE — ASSESSMENT & PLAN NOTE
Chronic and ongoing. Currently taking CBD because he did not like the way Clonazepam was making him feel. He continues to follow up with Dr. Luz, Neurologist.

## 2021-11-30 NOTE — ASSESSMENT & PLAN NOTE
Chronic and stable. Currently not using any medications. He states that he is feeling fine and denies any respiratory issues.

## 2021-11-30 NOTE — PROGRESS NOTES
"Chief Complaint   Patient presents with   • Annual Wellness Visit         HPI:  Florin Brooks is a 75 y.o. here for Medicare Annual Wellness Visit     HTN (hypertension)  Currently taking Lisinopril-Hydrochlorothiazide 10-12.5 mg as directed.   Reports diet is \"okay\".   He is not following a low-salt diet.  He is exercising regularly.  He is not taking baby aspirin daily.   He is not monitoring BP at home.   Denies symptoms low BP: light-headed, tunnel-vision, unusual fatigue, dizziness.  Denies symptoms high BP: pounding headache, visual changes, palpitations, flushed face.   Denies chest pain, shortness of breath, dyspnea on exertion.   Denies medicine side effects: unusual fatigue, slow heartbeat, foot/leg swelling, cough.    Parkinson disease (CMS-[facility])  Chronic and ongoing. Currently taking CBD because he did not like the way Clonazepam was making him feel. He continues to follow up with Dr. Luz, Neurologist.    COPD (chronic obstructive pulmonary disease)  Chronic and stable. Currently not using any medications. He states that he is feeling fine and denies any respiratory issues.      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 90 tablet 3   • cetirizine (ZYRTEC ALLERGY) 10 MG TABS Take 10 mg by mouth every day.       No current facility-administered medications for this visit.            Current supplements as per medication list.       Allergies: Flagyl " [metronidazole hcl]    Current social contact/activities: Gym 48 minutes a day      He  reports that he quit smoking about 22 years ago. He has never used smokeless tobacco. He reports current drug use. Drug: Marijuana. He reports that he does not drink alcohol.  Counseling given: Not Answered        DPA/Advanced Directive:  Patient does not have an Advanced Directive.  A packet and workshop information was given on Advanced Directives.      ROS:    Gait: Uses no assistive device   Ostomy: no   Other tubes: no   Amputations: no   Chronic oxygen use: no   Last eye exam:10/2021   : Denies any urinary leakage during the last 6 months incontinence.       Screening:    Depression Screening    Little interest or pleasure in doing things?   No  Feeling down, depressed , or hopeless?  No  Trouble falling or staying asleep, or sleeping too much?   No  Feeling tired or having little energy?   No  Poor appetite or overeating?   No  Feeling bad about yourself - or that you are a failure or have let yourself or your family down?  No  Trouble concentrating on things, such as reading the newspaper or watching television?  No  Moving or speaking so slowly that other people could have noticed.  Or the opposite - being so fidgety or restless that you have been moving around a lot more than usual?   No  Thoughts that you would be better off dead, or of hurting yourself?   No  Patient Health Questionnaire Score:  No    If depressive symptoms identified deferred to follow up visit unless specifically addressed in assessment and plan.    Interpretation of PHQ-9 Total Score   Score Severity   1-4 No Depression   5-9 Mild Depression   10-14 Moderate Depression   15-19 Moderately Severe Depression   20-27 Severe Depression      Screening for Cognitive Impairment    Three Minute Recall (captain, garden, picture)  2/3    Mateo clock face with all 12 numbers and set the hands to show 5 past 8.   No    Cognitive concerns identified deferred  for follow up unless specifically addressed in assessment and plan.    Fall Risk Assessment    Has the patient had two or more falls in the last year or any fall with injury in the last year?   Yes    Safety Assessment    Throw rugs on floor.   No  Handrails on all stairs.   Yes  Good lighting in all hallways.   Yes  Difficulty hearing.   No  Patient counseled about all safety risks that were identified.    Functional Assessment ADLs    Are there any barriers preventing you from cooking for yourself or meeting nutritional needs?   NO.    Are there any barriers preventing you from driving safely or obtaining transportation?   No.    Are there any barriers preventing you from using a telephone or calling for help?   No.    Are there any barriers preventing you from shopping?   No.    Are there any barriers preventing you from taking care of your own finances?   No.    Are there any barriers preventing you from managing your medications?   No.    Are there any barriers preventing you from showering, bathing or dressing yourself?  No.    Are you currently engaging in any exercise or physical activity?   48 minutes of Gym everyday    What is your perception of your health?   Fair.    Health Maintenance Summary          Overdue - IMM ZOSTER VACCINES (2 of 3) Overdue since 8/15/2011    06/20/2011  Imm Admin: Zoster Vaccine Live (ZVL) (Zostavax) - HISTORICAL DATA          Overdue - IMM DTaP/Tdap/Td Vaccine (2 - Td or Tdap) Overdue since 8/19/2021 08/19/2011  Imm Admin: Tdap Vaccine    08/26/1993  Imm Admin: TD Vaccine          Postponed - Annual Pulmonary Function Test / Spirometry (Yearly) Postponed until 3/30/2022    No completion history exists for this topic.          Annual Wellness Visit (Every 366 Days) Next due on 12/1/2022 11/30/2021  Visit Dx: Medicare annual wellness visit, subsequent    11/30/2021  Level of Service: ANNUAL WELLNESS VISIT-INCLUDES PPPS SUBSEQUE*    01/09/2018  Done          IMM  PNEUMOCOCCAL VACCINE: 65+ Years (Series Information) Completed    2016  Imm Admin: Pneumococcal Conjugate Vaccine (Prevnar/PCV-13)    2011  Imm Admin: Pneumococcal polysaccharide vaccine (PPSV-23)          HEPATITIS C SCREENING  Ordered on 2021  HEP C VIRUS ANTIBODY          IMM INFLUENZA (Series Information) Completed    09/10/2021  Imm Admin: Influenza Vaccine Adult HD    2020  Imm Admin: Influenza Vaccine Quad Inj (Pf)    10/05/2019  Imm Admin: Influenza, Unspecified - HISTORICAL DATA    2018  Imm Admin: Influenza Vaccine Adult HD    2017  Imm Admin: Influenza Vaccine Adult HD    Only the first 5 history entries have been loaded, but more history exists.          COVID-19 Vaccine (Series Information) Completed    2021  Imm Admin: Pfizer SARS-CoV-2 Vaccine 12+    2021  Imm Admin: Pfizer SARS-CoV-2 Vaccine    01/15/2021  Imm Admin: Pfizer SARS-CoV-2 Vaccine          IMM HEP B VACCINE (Series Information) Aged Out    No completion history exists for this topic.          IMM MENINGOCOCCAL VACCINE (MCV4) (Series Information) Aged Out    No completion history exists for this topic.          Discontinued - COLORECTAL CANCER SCREENING  Discontinued    10/27/2015  OCCULT BLOOD FECES IMMUNOASSAY                Patient Care Team:  Lea Jacobson A.P.R.N. as PCP - General (Nurse Practitioner Gerontology)  Kj Luz M.D. (Neurology)  Lynette Bansal, PT, DPT as Physical Therapist (Physical Therapy)      Social History     Tobacco Use   • Smoking status: Former Smoker     Quit date: 1999     Years since quittin.4   • Smokeless tobacco: Never Used   Vaping Use   • Vaping Use: Never used   Substance Use Topics   • Alcohol use: No     Alcohol/week: 0.0 oz   • Drug use: Yes     Types: Marijuana     Comment: CBD nightly      Family History   Problem Relation Age of Onset   • Alcohol/Drug Father    • Lung Disease Sister    • Cancer Neg Hx    • Diabetes Neg  "Hx    • Stroke Neg Hx      He  has a past medical history of COPD (chronic obstructive pulmonary disease) (HCC) (7/5/2013), ED (erectile dysfunction) (7/5/2013), Osteoarthritis (7/5/2013), Shingles, and Shortness of breath (7/5/2013).   Past Surgical History:   Procedure Laterality Date   • COLON RESECTION     • EYE SURGERY      muscle surgery   • FOOT SURGERY      neuroma/ganglion cyst   • HERNIA REPAIR     • KNEE ARTHROSCOPY      bilaterally       Exam:     /78 (BP Location: Left arm, Patient Position: Sitting, BP Cuff Size: Adult)   Pulse 86   Temp 36.4 °C (97.6 °F) (Temporal)   Resp 20   Ht 1.803 m (5' 11\")   Wt 83.9 kg (185 lb)   SpO2 99%  Body mass index is 25.8 kg/m².    Hearing good.    Dentition good  Alert, oriented in no acute distress.  Eye contact is good, speech goal directed, affect calm      Assessment and Plan. The following treatment and monitoring plan is recommended:    1. Medicare annual wellness visit, subsequent     2. Primary hypertension     3. Parkinson disease (HCC)     4. Chronic obstructive pulmonary disease, unspecified COPD type (HCC)           Services suggested: No services needed at this time  Health Care Screening: Age-appropriate preventive services Medicare covers discussed today and ordered if indicated.  Referrals offered: Community-based lifestyle interventions to reduce health risks and promote self-management and wellness, fall prevention, nutrition, physical activity, tobacco-use cessation, weight loss, and mental health services as per orders if indicated.    Discussion today about general wellness and lifestyle habits:    · Prevent falls and reduce trip hazards; Cautioned about securing or removing rugs.  · Have a working fire alarm and carbon monoxide detector;   · Engage in regular physical activity and social activities       Follow-up: Return in about 1 year (around 11/30/2022), or if symptoms worsen or fail to improve.  "

## 2021-11-30 NOTE — ASSESSMENT & PLAN NOTE
"Currently taking Lisinopril-Hydrochlorothiazide 10-12.5 mg as directed.   Reports diet is \"okay\".   He is not following a low-salt diet.  He is exercising regularly.  He is not taking baby aspirin daily.   He is not monitoring BP at home.   Denies symptoms low BP: light-headed, tunnel-vision, unusual fatigue, dizziness.  Denies symptoms high BP: pounding headache, visual changes, palpitations, flushed face.   Denies chest pain, shortness of breath, dyspnea on exertion.   Denies medicine side effects: unusual fatigue, slow heartbeat, foot/leg swelling, cough.  "

## 2022-06-06 DIAGNOSIS — I10 ESSENTIAL HYPERTENSION: ICD-10-CM

## 2022-06-07 RX ORDER — LISINOPRIL AND HYDROCHLOROTHIAZIDE 12.5; 1 MG/1; MG/1
TABLET ORAL
Qty: 90 TABLET | Refills: 0 | Status: SHIPPED | OUTPATIENT
Start: 2022-06-07 | End: 2022-09-15 | Stop reason: SDUPTHER

## 2022-09-15 DIAGNOSIS — I10 ESSENTIAL HYPERTENSION: ICD-10-CM

## 2022-09-16 RX ORDER — LISINOPRIL AND HYDROCHLOROTHIAZIDE 12.5; 1 MG/1; MG/1
1 TABLET ORAL DAILY
Qty: 90 TABLET | Refills: 0 | Status: SHIPPED | OUTPATIENT
Start: 2022-09-16 | End: 2022-11-23 | Stop reason: SDUPTHER

## 2022-09-16 NOTE — TELEPHONE ENCOUNTER
Requested Prescriptions     Pending Prescriptions Disp Refills   • lisinopril-hydrochlorothiazide (PRINZIDE) 10-12.5 MG per tablet 90 Tablet 0     Sig: Take 1 Tablet by mouth every day.

## 2022-11-02 ENCOUNTER — PATIENT MESSAGE (OUTPATIENT)
Dept: HEALTH INFORMATION MANAGEMENT | Facility: OTHER | Age: 77
End: 2022-11-02

## 2022-11-08 ENCOUNTER — TELEPHONE (OUTPATIENT)
Dept: MEDICAL GROUP | Facility: PHYSICIAN GROUP | Age: 77
End: 2022-11-08
Payer: MEDICARE

## 2022-11-08 NOTE — TELEPHONE ENCOUNTER
Caller Name: Florin  Call Back Number: [phone] (home)       How would the patient prefer to be contacted with a response: Phone call do NOT leave a detailed message    2. What are the patient's symptoms (location & severity)? COVID positive 11/08    3. Is this a new symptom No    4. When did it start? 11/01    5. Action taken per Active Symptom Guide:  Patient advise to quarantine for 5 days if symptoms get worse to go to Urgent Care or Emergency Room       6. Patient agrees to recommended action per Active Symptom Escalation Protocol.

## 2022-11-23 ENCOUNTER — OFFICE VISIT (OUTPATIENT)
Dept: MEDICAL GROUP | Facility: PHYSICIAN GROUP | Age: 77
End: 2022-11-23
Payer: MEDICARE

## 2022-11-23 VITALS
BODY MASS INDEX: 25.34 KG/M2 | TEMPERATURE: 98.5 F | WEIGHT: 181 LBS | SYSTOLIC BLOOD PRESSURE: 134 MMHG | DIASTOLIC BLOOD PRESSURE: 82 MMHG | RESPIRATION RATE: 16 BRPM | OXYGEN SATURATION: 97 % | HEIGHT: 71 IN | HEART RATE: 72 BPM

## 2022-11-23 DIAGNOSIS — R73.03 PREDIABETES: ICD-10-CM

## 2022-11-23 DIAGNOSIS — H54.61 VISION LOSS OF RIGHT EYE: ICD-10-CM

## 2022-11-23 DIAGNOSIS — I10 ESSENTIAL HYPERTENSION: ICD-10-CM

## 2022-11-23 DIAGNOSIS — Z13.220 ENCOUNTER FOR LIPID SCREENING FOR CARDIOVASCULAR DISEASE: ICD-10-CM

## 2022-11-23 DIAGNOSIS — G20.A1 PARKINSON DISEASE (HCC): ICD-10-CM

## 2022-11-23 DIAGNOSIS — Z13.6 ENCOUNTER FOR LIPID SCREENING FOR CARDIOVASCULAR DISEASE: ICD-10-CM

## 2022-11-23 DIAGNOSIS — J44.9 CHRONIC OBSTRUCTIVE PULMONARY DISEASE, UNSPECIFIED COPD TYPE (HCC): ICD-10-CM

## 2022-11-23 PROCEDURE — 99214 OFFICE O/P EST MOD 30 MIN: CPT | Performed by: NURSE PRACTITIONER

## 2022-11-23 RX ORDER — LISINOPRIL AND HYDROCHLOROTHIAZIDE 12.5; 1 MG/1; MG/1
1 TABLET ORAL DAILY
Qty: 90 TABLET | Refills: 3 | Status: SHIPPED | OUTPATIENT
Start: 2022-11-23 | End: 2023-10-16

## 2022-11-23 ASSESSMENT — PATIENT HEALTH QUESTIONNAIRE - PHQ9: CLINICAL INTERPRETATION OF PHQ2 SCORE: 0

## 2022-11-23 NOTE — ASSESSMENT & PLAN NOTE
Chronic and ongoing. Currently taking Carbidopa-Levodopa  mg three times a day. He states the medication seems to be working well. He continues to follow up with his Neurologist.

## 2022-11-23 NOTE — PROGRESS NOTES
"Subjective  Chief Complaint  Loss of Vision    History of Present Illness  Flroin Brooks is a 76 y.o. male. This established patient is here today to discuss loss of vision.    Vision loss of right eye  Acute and ongoing. He states that on 11-1-2022 he was on a cruise and he started to notice that his right eye started to lose vision. He was in [place] so he didn't want to say anything so he waited until he got back here. He states that he is able to see out of the bottom left of his right eye. He has not seen an eye doctor about this yet.    COPD (chronic obstructive pulmonary disease)  Chronic and stable. Currently not using any medications. He states that he is feeling fine and denies any respiratory issues.    Parkinson disease (CMS-[facility])  Chronic and ongoing. Currently taking Carbidopa-Levodopa  mg three times a day. He states the medication seems to be working well. He continues to follow up with his Neurologist.    Essential hypertension  Currently taking Lisinopril-Hydrochlorothiazide 10-12.5 mg as directed.   Reports diet is \"okay\".   He is not following a low-salt diet.  He is exercising regularly.  He is not taking baby aspirin daily.   He is not monitoring BP at home.   Denies symptoms low BP: light-headed, tunnel-vision, unusual fatigue, dizziness.  Denies symptoms high BP: pounding headache, visual changes, palpitations, flushed face.   Denies chest pain, shortness of breath, dyspnea on exertion.   Denies medicine side effects: unusual fatigue, slow heartbeat, foot/leg swelling, cough.    Past Medical History    Allergies: Flagyl [metronidazole hcl]  Past Medical History:   Diagnosis Date    COPD (chronic obstructive pulmonary disease) ([facility]) 7/5/2013    ED (erectile dysfunction) 7/5/2013    Osteoarthritis 7/5/2013    Shingles     Shortness of breath 7/5/2013     Past Surgical History:   Procedure Laterality Date    COLON RESECTION      EYE SURGERY      muscle surgery    FOOT SURGERY      " "neuroma/ganglion cyst    HERNIA REPAIR      KNEE ARTHROSCOPY      bilaterally     Current Outpatient Medications Ordered in Epic   Medication Sig Dispense Refill    carbidopa-levodopa (SINEMET)  MG Tab Take 1 Tablet by mouth 3 times a day.      lisinopril-hydrochlorothiazide (PRINZIDE) 10-12.5 MG per tablet Take 1 Tablet by mouth every day. 90 Tablet 3    cetirizine (ZYRTEC) 10 MG Tab Take 10 mg by mouth every day.       No current [facility]-ordered facility-administered medications on file.     Family History:    Family History   Problem Relation Age of Onset    Alcohol/Drug Father     Lung Disease Sister     Cancer Neg Hx     Diabetes Neg Hx     Stroke Neg Hx       Personal/Social History:    Social History     Tobacco Use    Smoking status: Former     Types: Cigarettes     Quit date: 1999     Years since quittin.4    Smokeless tobacco: Never   Vaping Use    Vaping Use: Never used   Substance Use Topics    Alcohol use: No     Alcohol/week: 0.0 oz    Drug use: Yes     Types: Marijuana, Oral     Comment: CBD nightly      Social History     Social History Narrative    Not on file      Review of Systems:   General: Negative for fever/chills and unexpected weight change.   Eyes:  Negative for eye pain. Positive for vision loss.  Respiratory:  Negative for cough and dyspnea.    Cardiovascular:  Negative for chest pain and palpitations.  Musculoskeletal:  Negative for myalgias.   Skin:  Negative for rash.     Objective  Physical Exam:   /82 (BP Location: Left arm, Patient Position: Sitting, BP Cuff Size: Adult)   Pulse 72   Temp 36.9 °C (98.5 °F) (Temporal)   Resp 16   Ht 1.803 m (5' 11\")   Wt 82.1 kg (181 lb)   SpO2 97%  Body mass index is 25.24 kg/m².  General:  Alert and oriented.  Well appearing.  NAD  Neck: Supple without JVD. No lymphadenopathy.  Pulmonary:  Normal effort.  Clear to ausculation without rales, ronchi, or wheezing.  Cardiovascular:  Regular rate and rhythm without murmur, rubs " or gallop.   Skin:  Warm and dry.  No obvious lesions.  Musculoskeletal:  No extremity cyanosis, clubbing, or edema.      Assessment/Plan  1. Vision loss of right eye  Acute and ongoing.  Discussed with him that he should be referred to an Ophthalmology for testing and treatment, he verbalized understanding and referral placed at this time.  - Referral to Ophthalmology    2. Essential hypertension  Chronic and ongoing.  Continue to take Lisinopril-Hydrochlorothiazide 10-12.5 mg daily.  Educated on a healthy diet and exercise.  Due for updated labs.  - lisinopril-hydrochlorothiazide (PRINZIDE) 10-12.5 MG per tablet; Take 1 Tablet by mouth every day.  Dispense: 90 Tablet; Refill: 3  - Comp Metabolic Panel; Future    3. Parkinson disease (HCC)  Chronic and ongoing.  Continue to take Carbidopa-Levodopa  mg three times daily.    4. Chronic obstructive pulmonary disease, unspecified COPD type (HCC)  Chronic and stable.    5. Prediabetes  Chronic and stable.  Due for updated labs.  - HEMOGLOBIN A1C; Future    6. Encounter for lipid screening for cardiovascular disease  Due for updated labs.  - Lipid Profile; Future      Health Maintenance: Discussed with patient.    Return in about 1 month (around 12/23/2022) for F/U Labs.    Please note that this dictation was created using voice recognition software. I have made every reasonable attempt to correct obvious errors, but I expect that there are errors of grammar and possibly content that I did not discover before finalizing the note.    BENOIT Duenas  [facility]

## 2022-11-23 NOTE — ASSESSMENT & PLAN NOTE
Acute and ongoing. He states that on 11-1-2022 he was on a cruise and he started to notice that his right eye started to lose vision. He was in [place] so he didn't want to say anything so he waited until he got back here. He states that he is able to see out of the bottom left of his right eye. He has not seen an eye doctor about this yet.

## 2022-12-12 ENCOUNTER — HOSPITAL ENCOUNTER (OUTPATIENT)
Dept: LAB | Facility: MEDICAL CENTER | Age: 77
End: 2022-12-12
Attending: NURSE PRACTITIONER
Payer: MEDICARE

## 2022-12-12 DIAGNOSIS — I10 ESSENTIAL HYPERTENSION: ICD-10-CM

## 2022-12-12 DIAGNOSIS — R73.03 PREDIABETES: ICD-10-CM

## 2022-12-12 DIAGNOSIS — Z13.220 ENCOUNTER FOR LIPID SCREENING FOR CARDIOVASCULAR DISEASE: ICD-10-CM

## 2022-12-12 DIAGNOSIS — Z13.6 ENCOUNTER FOR LIPID SCREENING FOR CARDIOVASCULAR DISEASE: ICD-10-CM

## 2022-12-12 LAB
ALBUMIN SERPL BCP-MCNC: 4.2 G/DL (ref 3.2–4.9)
ALBUMIN/GLOB SERPL: 1.8 G/DL
ALP SERPL-CCNC: 77 U/L (ref 30–99)
ALT SERPL-CCNC: 15 U/L (ref 2–50)
ANION GAP SERPL CALC-SCNC: 9 MMOL/L (ref 7–16)
AST SERPL-CCNC: 19 U/L (ref 12–45)
BILIRUB SERPL-MCNC: 1 MG/DL (ref 0.1–1.5)
BUN SERPL-MCNC: 13 MG/DL (ref 8–22)
CALCIUM SERPL-MCNC: 9.3 MG/DL (ref 8.5–10.5)
CHLORIDE SERPL-SCNC: 103 MMOL/L (ref 96–112)
CHOLEST SERPL-MCNC: 166 MG/DL (ref 100–199)
CO2 SERPL-SCNC: 29 MMOL/L (ref 20–33)
CREAT SERPL-MCNC: 0.73 MG/DL (ref 0.5–1.4)
EST. AVERAGE GLUCOSE BLD GHB EST-MCNC: 103 MG/DL
FASTING STATUS PATIENT QL REPORTED: NORMAL
GFR SERPLBLD CREATININE-BSD FMLA CKD-EPI: 94 ML/MIN/1.73 M 2
GLOBULIN SER CALC-MCNC: 2.4 G/DL (ref 1.9–3.5)
GLUCOSE SERPL-MCNC: 96 MG/DL (ref 65–99)
HBA1C MFR BLD: 5.2 % (ref 4–5.6)
HDLC SERPL-MCNC: 45 MG/DL
LDLC SERPL CALC-MCNC: 97 MG/DL
POTASSIUM SERPL-SCNC: 3.4 MMOL/L (ref 3.6–5.5)
PROT SERPL-MCNC: 6.6 G/DL (ref 6–8.2)
SODIUM SERPL-SCNC: 141 MMOL/L (ref 135–145)
TRIGL SERPL-MCNC: 119 MG/DL (ref 0–149)

## 2022-12-12 PROCEDURE — 36415 COLL VENOUS BLD VENIPUNCTURE: CPT

## 2022-12-12 PROCEDURE — 83036 HEMOGLOBIN GLYCOSYLATED A1C: CPT | Mod: GA

## 2022-12-12 PROCEDURE — 80061 LIPID PANEL: CPT

## 2022-12-12 PROCEDURE — 80053 COMPREHEN METABOLIC PANEL: CPT

## 2023-02-22 ENCOUNTER — OFFICE VISIT (OUTPATIENT)
Dept: MEDICAL GROUP | Facility: PHYSICIAN GROUP | Age: 78
End: 2023-02-22
Payer: COMMERCIAL

## 2023-02-22 VITALS
DIASTOLIC BLOOD PRESSURE: 80 MMHG | BODY MASS INDEX: 26.25 KG/M2 | WEIGHT: 187.5 LBS | HEART RATE: 61 BPM | OXYGEN SATURATION: 99 % | SYSTOLIC BLOOD PRESSURE: 130 MMHG | TEMPERATURE: 97.6 F | HEIGHT: 71 IN

## 2023-02-22 DIAGNOSIS — R22.9 SKIN MASS: ICD-10-CM

## 2023-02-22 PROCEDURE — 99213 OFFICE O/P EST LOW 20 MIN: CPT | Performed by: NURSE PRACTITIONER

## 2023-02-22 NOTE — PROGRESS NOTES
Subjective  Chief Complaint  Skin Mass    History of Present Illness  Florin Brooks is a 77 y.o. male. This established patient is here today to discuss a skin mass.    Skin mass  New diagnosis. He states that for the past month he has noticed a skin mass to the left side and right side of his neck. He states that he feels like the bumps are getting bigger. Denies any pain, redness or drainage to the area.    Past Medical History    Allergies: Flagyl [metronidazole hcl]  Past Medical History:   Diagnosis Date    COPD (chronic obstructive pulmonary disease) (HCC) 2013    ED (erectile dysfunction) 2013    Osteoarthritis 2013    Shingles     Shortness of breath 2013     Past Surgical History:   Procedure Laterality Date    COLON RESECTION      EYE SURGERY      muscle surgery    FOOT SURGERY      neuroma/ganglion cyst    HERNIA REPAIR      KNEE ARTHROSCOPY      bilaterally     Current Outpatient Medications Ordered in Epic   Medication Sig Dispense Refill    lisinopril-hydrochlorothiazide (PRINZIDE) 10-12.5 MG per tablet Take 1 Tablet by mouth every day. 90 Tablet 3    cetirizine (ZYRTEC) 10 MG Tab Take 10 mg by mouth every day.      carbidopa-levodopa (SINEMET)  MG Tab Take 1 Tablet by mouth 3 times a day.       No current [facility]-ordered facility-administered medications on file.     Family History:    Family History   Problem Relation Age of Onset    Alcohol/Drug Father     Lung Disease Sister     Cancer Neg Hx     Diabetes Neg Hx     Stroke Neg Hx       Personal/Social History:    Social History     Tobacco Use    Smoking status: Former     Types: Cigarettes     Quit date: 1999     Years since quittin.6    Smokeless tobacco: Never   Vaping Use    Vaping Use: Never used   Substance Use Topics    Alcohol use: No     Alcohol/week: 0.0 oz    Drug use: Yes     Types: Marijuana, Oral     Comment: CBD nightly      Social History     Social History Narrative    Not on file      Review of  "Systems:   General: Negative for fever/chills and unexpected weight change.   Respiratory:  Negative for cough and dyspnea.    Cardiovascular:  Negative for chest pain and palpitations.  Musculoskeletal:  Negative for myalgias.   Skin: Positive for skin mass.    Objective  Physical Exam:   /80 (BP Location: Left arm, Patient Position: Sitting, BP Cuff Size: Adult)   Pulse 61   Temp 36.4 °C (97.6 °F) (Temporal)   Ht 1.803 m (5' 11\")   Wt 85 kg (187 lb 8 oz)   SpO2 99%  Body mass index is 26.15 kg/m².  General:  Alert and oriented.  Well appearing.  NAD  Neck: Supple without JVD. No lymphadenopathy.  Pulmonary:  Normal effort.  Clear to ausculation without rales, ronchi, or wheezing.  Cardiovascular:  Regular rate and rhythm without murmur, rubs or gallop.   Skin: Right upper neck area behind ear with 0.5 mm in diameter palpable mass, no erythema or drainage noted. Left upper neck area behind ear with 1 mm in diameter palpable mass, no erythema or drainage noted.      Assessment/Plan  1. Skin mass  New diagnosis.  Discussed a referral to Dermatology, he is agreeable and would like the referral placed at this time.  - Referral to Dermatology      Health Maintenance: Completed    Return if symptoms worsen or fail to improve.    Discussed that the patient carries some responsibility in management of their health care.    Please note that this dictation was created using voice recognition software. I have made every reasonable attempt to correct obvious errors, but I expect that there are errors of grammar and possibly content that I did not discover before finalizing the note.    BENOIT Duenas  [facility]  "

## 2023-02-22 NOTE — ASSESSMENT & PLAN NOTE
New diagnosis. He states that for the past month he has noticed a skin mass to the left side and right side of his neck. He states that he feels like the bumps are getting bigger. Denies any pain, redness or drainage to the area.

## 2023-04-26 ENCOUNTER — APPOINTMENT (RX ONLY)
Dept: URBAN - METROPOLITAN AREA CLINIC 22 | Facility: CLINIC | Age: 78
Setting detail: DERMATOLOGY
End: 2023-04-26

## 2023-04-26 DIAGNOSIS — L81.4 OTHER MELANIN HYPERPIGMENTATION: ICD-10-CM

## 2023-04-26 DIAGNOSIS — L72.8 OTHER FOLLICULAR CYSTS OF THE SKIN AND SUBCUTANEOUS TISSUE: ICD-10-CM

## 2023-04-26 DIAGNOSIS — L57.0 ACTINIC KERATOSIS: ICD-10-CM

## 2023-04-26 DIAGNOSIS — Z71.89 OTHER SPECIFIED COUNSELING: ICD-10-CM

## 2023-04-26 PROBLEM — D48.5 NEOPLASM OF UNCERTAIN BEHAVIOR OF SKIN: Status: ACTIVE | Noted: 2023-04-26

## 2023-04-26 PROCEDURE — 99203 OFFICE O/P NEW LOW 30 MIN: CPT | Mod: 25

## 2023-04-26 PROCEDURE — 17004 DESTROY PREMAL LESIONS 15/>: CPT

## 2023-04-26 PROCEDURE — ? LIQUID NITROGEN

## 2023-04-26 PROCEDURE — 11102 TANGNTL BX SKIN SINGLE LES: CPT | Mod: 59

## 2023-04-26 PROCEDURE — ? BIOPSY BY SHAVE METHOD

## 2023-04-26 PROCEDURE — ? SUNSCREEN RECOMMENDATIONS

## 2023-04-26 PROCEDURE — ? COUNSELING

## 2023-04-26 ASSESSMENT — LOCATION DETAILED DESCRIPTION DERM
LOCATION DETAILED: RIGHT CENTRAL MALAR CHEEK
LOCATION DETAILED: LEFT INFERIOR CENTRAL MALAR CHEEK
LOCATION DETAILED: RIGHT DISTAL DORSAL FOREARM
LOCATION DETAILED: RIGHT PROXIMAL DORSAL FOREARM
LOCATION DETAILED: RIGHT SUPERIOR POSTERIOR NECK
LOCATION DETAILED: LEFT INFERIOR LATERAL MALAR CHEEK
LOCATION DETAILED: RIGHT SUPERIOR HELIX
LOCATION DETAILED: RIGHT RADIAL DORSAL HAND
LOCATION DETAILED: LEFT CENTRAL MALAR CHEEK
LOCATION DETAILED: LEFT PROXIMAL DORSAL FOREARM
LOCATION DETAILED: LEFT FOREHEAD
LOCATION DETAILED: RIGHT FOREHEAD
LOCATION DETAILED: RIGHT MID TEMPLE
LOCATION DETAILED: RIGHT INFERIOR CENTRAL MALAR CHEEK
LOCATION DETAILED: LEFT ULNAR DORSAL HAND
LOCATION DETAILED: LEFT SCAPHA
LOCATION DETAILED: LEFT DISTAL DORSAL FOREARM

## 2023-04-26 ASSESSMENT — LOCATION ZONE DERM
LOCATION ZONE: NECK
LOCATION ZONE: ARM
LOCATION ZONE: FACE
LOCATION ZONE: EAR
LOCATION ZONE: HAND

## 2023-04-26 ASSESSMENT — LOCATION SIMPLE DESCRIPTION DERM
LOCATION SIMPLE: RIGHT HAND
LOCATION SIMPLE: RIGHT FOREARM
LOCATION SIMPLE: RIGHT CHEEK
LOCATION SIMPLE: LEFT EAR
LOCATION SIMPLE: LEFT FOREARM
LOCATION SIMPLE: RIGHT TEMPLE
LOCATION SIMPLE: LEFT FOREHEAD
LOCATION SIMPLE: NECK
LOCATION SIMPLE: RIGHT FOREHEAD
LOCATION SIMPLE: LEFT CHEEK
LOCATION SIMPLE: LEFT HAND
LOCATION SIMPLE: RIGHT EAR

## 2023-04-26 NOTE — PROCEDURE: LIQUID NITROGEN
Render Post-Care Instructions In Note?: no
Number Of Freeze-Thaw Cycles: 2 freeze-thaw cycles
Post-Care Instructions: I reviewed with the patient in detail post-care instructions. Patient is to wear sunprotection, and avoid picking at any of the treated lesions. Pt may apply Vaseline to crusted or scabbing areas.
Show Applicator Variable?: Yes
Duration Of Freeze Thaw-Cycle (Seconds): 3
Consent: The patient's consent was obtained including but not limited to risks of crusting, scabbing, blistering, scarring, darker or lighter pigmentary change, recurrence, incomplete removal and infection.
Detail Level: Detailed

## 2023-06-15 ENCOUNTER — APPOINTMENT (RX ONLY)
Dept: URBAN - METROPOLITAN AREA CLINIC 22 | Facility: CLINIC | Age: 78
Setting detail: DERMATOLOGY
End: 2023-06-15

## 2023-06-15 PROBLEM — C44.41 BASAL CELL CARCINOMA OF SKIN OF SCALP AND NECK: Status: ACTIVE | Noted: 2023-06-15

## 2023-06-15 PROCEDURE — 17312 MOHS ADDL STAGE: CPT

## 2023-06-15 PROCEDURE — ? MOHS SURGERY

## 2023-06-15 PROCEDURE — 17311 MOHS 1 STAGE H/N/HF/G: CPT

## 2023-06-15 PROCEDURE — 13132 CMPLX RPR F/C/C/M/N/AX/G/H/F: CPT

## 2023-06-15 NOTE — PROCEDURE: MOHS SURGERY
Mohs Case Number: [id number]
Previous Accession (Optional): [id number]
Biopsy Photograph Reviewed: Yes
Referring Physician (Optional): Yasmeen
Consent Type: Consent 1 (Standard)
Eye Shield Used: No
Surgeon Performing Repair (Optional): Reynold Fountain M.D.
Initial Size Of Lesion: 0.7
X Size Of Lesion In Cm (Optional): 0.6
Number Of Stages: 2
Primary Defect Length In Cm (Final Defect Size - Required For Flaps/Grafts): 2.8
Primary Defect Width In Cm (Final Defect Size - Required For Flaps/Grafts): 1.8
Repair Type: Complex Repair
Oculoplastic Surgeon Procedure Text (A): After obtaining clear surgical margins the patient was sent to oculoplastics for surgical repair.  The patient understands they will receive post-surgical care and follow-up from the referring physician's office.
Otolaryngologist Procedure Text (A): After obtaining clear surgical margins the patient was sent to otolaryngology for surgical repair.  The patient understands they will receive post-surgical care and follow-up from the referring physician's office.
Plastic Surgeon Procedure Text (A): After obtaining clear surgical margins the patient was sent to plastics for surgical repair.  The patient understands they will receive post-surgical care and follow-up from the referring physician's office.
Mid-Level Procedure Text (A): After obtaining clear surgical margins the patient was sent to a mid-level provider for surgical repair.  The patient understands they will receive post-surgical care and follow-up from the mid-level provider.
Provider Procedure Text (A): After obtaining clear surgical margins the defect was repaired by another provider.
Asc Procedure Text (A): After obtaining clear surgical margins the patient was sent to an ASC for surgical repair.  The patient understands they will receive post-surgical care and follow-up from the ASC physician.
Simple / Intermediate / Complex Repair - Final Wound Length In Cm: 5.7
Suturegard Retention Suture: 2-0 Nylon
Retention Suture Bite Size: 3 mm
Length To Time In Minutes Device Was In Place: 10
Number Of Hemigard Strips Per Side: 1
Distance Of Undermining In Cm (Required): 2.2
Undermining Type: Entire Wound
Debridement Text: The wound edges were debrided prior to proceeding with the closure to facilitate wound healing.
Helical Rim Text: The closure involved the helical rim.
Vermilion Border Text: The closure involved the vermilion border.
Nostril Rim Text: The closure involved the nostril rim.
Retention Suture Text: Retention sutures were placed to support the closure and prevent dehiscence.
Secondary Defect Length In Cm (Required For Flaps): 0
Location Indication Override (Is Already Calculated Based On Selected Body Location): Area M
Area H Indication Text: Tumors in this location are included in Area H (eyelids, eyebrows, nose, lips, chin, ear, pre-auricular, post-auricular, temple, genitalia, hands, feet, ankles and areola).  Tissue conservation is critical in these anatomic locations.
Area M Indication Text: Tumors in this location are included in Area M (cheek, forehead, scalp, neck, jawline and pretibial skin).  Mohs surgery is indicated for tumors in these anatomic locations.
Area L Indication Text: Tumors in this location are included in Area L (trunk and extremities).  Mohs surgery is indicated for larger tumors, or tumors with aggressive histologic features, in these anatomic locations.
Tumor Debulked?: curette
Depth Of Tumor Invasion (For Histology): dermis
Perineural Invasion (For Histology - Be Specific If Possible): absent
Presence Of Scar Tissue (For Histology): present
Surgical Defect Length In Cm (Optional): 1.5
Surgical Defect Width In Cm (Optional): 1.2
Special Stains Stage 1 - Results: Base On Clearance Noted Above
Stage 2: Additional Anesthesia Volume In Cc: 3.0
Stage 2: Additional Anesthesia Type: 1% lidocaine with epinephrine and a 1:10 solution of 8.4% sodium bicarbonate
Stage 4: Additional Anesthesia Type: 1% lidocaine with epinephrine
Include Tumor Staging In Mohs Note?: Please Select the Appropriate Response
Staging Info: By selecting yes to the question above you will include information on AJCC 8 tumor staging in your Mohs note. Information on tumor staging will be automatically added for SCCs on the head and neck. AJCC 8 includes tumor size, tumor depth, perineural involvement and bone invasion.
Tumor Depth: Less than 6mm from granular layer and no invasion beyond the subcutaneous fat
Medical Necessity Statement: Based on my medical judgement, Mohs surgery is the most appropriate treatment for this cancer compared to other treatments.
Alternatives Discussed Intro (Do Not Add Period): I discussed alternative treatments to Mohs surgery and specifically discussed the risks and benefits of
Consent 1/Introductory Paragraph: The rationale for Mohs was explained to the patient and consent was obtained. The risks, benefits and alternatives to therapy were discussed in detail. Specifically, the risks of infection, scarring, bleeding, prolonged wound healing, incomplete removal, allergy to anesthesia, nerve injury and recurrence were addressed. Prior to the procedure, the treatment site was clearly identified and confirmed by the patient. All components of Universal Protocol/PAUSE Rule completed.
Consent 2/Introductory Paragraph: Mohs surgery was explained to the patient and consent was obtained. The risks, benefits and alternatives to therapy were discussed in detail. Specifically, the risks of infection, scarring, bleeding, prolonged wound healing, incomplete removal, allergy to anesthesia, nerve injury and recurrence were addressed. Prior to the procedure, the treatment site was clearly identified and confirmed by the patient. All components of Universal Protocol/PAUSE Rule completed.
Consent 3/Introductory Paragraph: I gave the patient a chance to ask questions they had about the procedure.  Following this I explained the Mohs procedure and consent was obtained. The risks, benefits and alternatives to therapy were discussed in detail. Specifically, the risks of infection, scarring, bleeding, prolonged wound healing, incomplete removal, allergy to anesthesia, nerve injury and recurrence were addressed. Prior to the procedure, the treatment site was clearly identified and confirmed by the patient. All components of Universal Protocol/PAUSE Rule completed.
Consent (Temporal Branch)/Introductory Paragraph: The rationale for Mohs was explained to the patient and consent was obtained. The risks, benefits and alternatives to therapy were discussed in detail. Specifically, the risks of damage to the temporal branch of the facial nerve, infection, scarring, bleeding, prolonged wound healing, incomplete removal, allergy to anesthesia, and recurrence were addressed. Prior to the procedure, the treatment site was clearly identified and confirmed by the patient. All components of Universal Protocol/PAUSE Rule completed.
Consent (Marginal Mandibular)/Introductory Paragraph: The rationale for Mohs was explained to the patient and consent was obtained. The risks, benefits and alternatives to therapy were discussed in detail. Specifically, the risks of damage to the marginal mandibular branch of the facial nerve, infection, scarring, bleeding, prolonged wound healing, incomplete removal, allergy to anesthesia, and recurrence were addressed. Prior to the procedure, the treatment site was clearly identified and confirmed by the patient. All components of Universal Protocol/PAUSE Rule completed.
Consent (Spinal Accessory)/Introductory Paragraph: The rationale for Mohs was explained to the patient and consent was obtained. The risks, benefits and alternatives to therapy were discussed in detail. Specifically, the risks of damage to the spinal accessory nerve, infection, scarring, bleeding, prolonged wound healing, incomplete removal, allergy to anesthesia, and recurrence were addressed. Prior to the procedure, the treatment site was clearly identified and confirmed by the patient. All components of Universal Protocol/PAUSE Rule completed.
Consent (Near Eyelid Margin)/Introductory Paragraph: The rationale for Mohs was explained to the patient and consent was obtained. The risks, benefits and alternatives to therapy were discussed in detail. Specifically, the risks of ectropion or eyelid deformity, infection, scarring, bleeding, prolonged wound healing, incomplete removal, allergy to anesthesia, nerve injury and recurrence were addressed. Prior to the procedure, the treatment site was clearly identified and confirmed by the patient. All components of Universal Protocol/PAUSE Rule completed.
Consent (Ear)/Introductory Paragraph: The rationale for Mohs was explained to the patient and consent was obtained. The risks, benefits and alternatives to therapy were discussed in detail. Specifically, the risks of ear deformity, infection, scarring, bleeding, prolonged wound healing, incomplete removal, allergy to anesthesia, nerve injury and recurrence were addressed. Prior to the procedure, the treatment site was clearly identified and confirmed by the patient. All components of Universal Protocol/PAUSE Rule completed.
Consent (Nose)/Introductory Paragraph: The rationale for Mohs was explained to the patient and consent was obtained. The risks, benefits and alternatives to therapy were discussed in detail. Specifically, the risks of nasal deformity, changes in the flow of air through the nose, infection, scarring, bleeding, prolonged wound healing, incomplete removal, allergy to anesthesia, nerve injury and recurrence were addressed. Prior to the procedure, the treatment site was clearly identified and confirmed by the patient. All components of Universal Protocol/PAUSE Rule completed.
Consent (Lip)/Introductory Paragraph: The rationale for Mohs was explained to the patient and consent was obtained. The risks, benefits and alternatives to therapy were discussed in detail. Specifically, the risks of lip deformity, changes in the oral aperture, infection, scarring, bleeding, prolonged wound healing, incomplete removal, allergy to anesthesia, nerve injury and recurrence were addressed. Prior to the procedure, the treatment site was clearly identified and confirmed by the patient. All components of Universal Protocol/PAUSE Rule completed.
Consent (Scalp)/Introductory Paragraph: The rationale for Mohs was explained to the patient and consent was obtained. The risks, benefits and alternatives to therapy were discussed in detail. Specifically, the risks of changes in hair growth pattern secondary to repair, infection, scarring, bleeding, prolonged wound healing, incomplete removal, allergy to anesthesia, nerve injury and recurrence were addressed. Prior to the procedure, the treatment site was clearly identified and confirmed by the patient. All components of Universal Protocol/PAUSE Rule completed.
Detail Level: Detailed
Postop Diagnosis: same
Anesthesia Volume In Cc: 12
Additional Anesthesia Volume In Cc: 6
Hemostasis: Electrodesiccation
Estimated Blood Loss (Cc): minimal
Repair Anesthesia Method: local infiltration
Undermining Location (Optional): in the deep fat
Brow Lift Text: A midfrontal incision was made medially to the defect to allow access to the tissues just superior to the left eyebrow. Following careful dissection inferiorly in a supraperiosteal plane to the level of the left eyebrow, several 3-0 monocryl sutures were used to resuspend the eyebrow orbicularis oculi muscular unit to the superior frontal bone periosteum. This resulted in an appropriate reapproximation of static eyebrow symmetry and correction of the left brow ptosis.
Deep Sutures: 4-0 Maxon
Epidermal Sutures: 5-0 Surgipro
Epidermal Closure: running
Suturegard Intro: Intraoperative tissue expansion was performed, utilizing the SUTUREGARD device, in order to reduce wound tension.
Suturegard Body: The suture ends were repeatedly re-tightened and re-clamped to achieve the desired tissue expansion.
Hemigard Intro: Due to skin fragility and wound tension, it was decided to use HEMIGARD adhesive retention suture devices to permit a linear closure. The skin was cleaned and dried for a 6cm distance away from the wound. Excessive hair, if present, was removed to allow for adhesion.
Hemigard Postcare Instructions: The HEMIGARD strips are to remain completely dry for at least 5-7 days.
Donor Site Anesthesia Type: same as repair anesthesia
Epidermal Closure Graft Donor Site (Optional): simple interrupted
Graft Donor Site Bandage (Optional-Leave Blank If You Don't Want In Note): Steri-strips and a pressure bandage were applied to the donor site.
Closure 2 Information: This tab is for additional flaps and grafts, including complex repair and grafts and complex repair and flaps. You can also specify a different location for the additional defect, if the location is the same you do not need to select a new one. We will insert the automated text for the repair you select below just as we do for solitary flaps and grafts. Please note that at this time if you select a location with a different insurance zone you will need to override the ICD10 and CPT if appropriate.
Closure 3 Information: This tab is for additional flaps and grafts above and beyond our usual structured repairs.  Please note if you enter information here it will not currently bill and you will need to add the billing information manually.
Wound Care: Petrolatum
Dressing: pressure dressing with telfa
Dressing (No Sutures): dry sterile dressing
Suture Removal: 14 days
Unna Boot Text: An Unna boot was placed to help immobilize the limb and facilitate more rapid healing.
Home Suture Removal Text: Patient was provided instructions on removing sutures and will remove their sutures at home.  If they have any questions or difficulties they will call the office.
Post-Care Instructions: I reviewed with the patient in detail post-care instructions. Patient is not to engage in any heavy lifting, exercise, or swimming for the next 14 days. Should the patient develop any fevers, chills, bleeding, severe pain patient will contact the office immediately.
Pain Refusal Text: I offered to prescribe pain medication but the patient refused to take this medication.
Mauc Instructions: By selecting yes to the question below the MAUC number will be added into the note.  This will be calculated automatically based on the diagnosis chosen, the size entered, the body zone selected (H,M,L) and the specific indications you chose. You will also have the option to override the Mohs AUC if you disagree with the automatically calculated number and this option is found in the Case Summary tab.
Where Do You Want The Question To Include Opioid Counseling Located?: Case Summary Tab
Eye Protection Verbiage: Before proceeding with the stage, a plastic scleral shield was inserted. The globe was anesthetized with a few drops of 1% lidocaine with 1:100,000 epinephrine. Then, an appropriate sized scleral shield was chosen and coated with lacrilube ointment. The shield was gently inserted and left in place for the duration of each stage. After the stage was completed, the shield was gently removed.
Mohs Method Verbiage: An incision at a 45 degree angle following the standard Mohs approach was done and the specimen was harvested as a microscopic controlled layer.
Surgeon/Pathologist Verbiage (Will Incorporate Name Of Surgeon From Intro If Not Blank): operated in two distinct and integrated capacities as the surgeon and pathologist.
Mohs Histo Method Verbiage: Each section was then chromacoded and processed in the Mohs lab using the Mohs protocol and submitted for frozen section.
Subsequent Stages Histo Method Verbiage: Using a similar technique to that described above, a thin layer of tissue was removed from all areas where tumor was visible on the previous stage.  The tissue was again oriented, mapped, dyed, and processed as above.
Mohs Rapid Report Verbiage: The area of clinically evident tumor was marked with skin marking ink and appropriately hatched.  The initial incision was made following the Mohs approach through the skin.  The specimen was taken to the lab, divided into the necessary number of pieces, chromacoded and processed according to the Mohs protocol.  This was repeated in successive stages until a tumor free defect was achieved.
Complex Repair Preamble Text (Leave Blank If You Do Not Want): Extensive wide undermining was performed.
Intermediate Repair Preamble Text (Leave Blank If You Do Not Want): Undermining was performed with blunt dissection.
Non-Graft Cartilage Fenestration Text: The cartilage was fenestrated with a 2mm punch biopsy to help facilitate healing.
Graft Cartilage Fenestration Text: The cartilage was fenestrated with a 2mm punch biopsy to help facilitate graft survival and healing.
Secondary Intention Text (Leave Blank If You Do Not Want): The defect will heal with secondary intention.
No Repair - Repaired With Adjacent Surgical Defect Text (Leave Blank If You Do Not Want): After obtaining clear surgical margins the defect was repaired concurrently with another surgical defect which was in close approximation.
Adjacent Tissue Transfer Text: The defect edges were debeveled with a #15 scalpel blade. Given the location of the defect and the proximity to free margins an adjacent tissue transfer was deemed most appropriate. Using a sterile surgical marker, an appropriate flap was drawn incorporating the defect and placing the expected incisions within the relaxed skin tension lines where possible. The area thus outlined was incised deep to adipose tissue with a #15 scalpel blade. The skin margins were undermined to an appropriate distance in all directions utilizing iris scissors and carried over to close the primary defect.
Advancement Flap (Single) Text: The defect edges were debeveled with a #15 scalpel blade.  Given the location of the defect and the proximity to free margins a single advancement flap was deemed most appropriate.  Using a sterile surgical marker, an appropriate advancement flap was drawn incorporating the defect and placing the expected incisions within the relaxed skin tension lines where possible.    The area thus outlined was incised deep to adipose tissue with a #15 scalpel blade.  The skin margins were undermined to an appropriate distance in all directions utilizing iris scissors.
Advancement Flap (Double) Text: The defect edges were debeveled with a #15 scalpel blade.  Given the location of the defect and the proximity to free margins a double advancement flap was deemed most appropriate.  Using a sterile surgical marker, the appropriate advancement flaps were drawn incorporating the defect and placing the expected incisions within the relaxed skin tension lines where possible.    The area thus outlined was incised deep to adipose tissue with a #15 scalpel blade.  The skin margins were undermined to an appropriate distance in all directions utilizing iris scissors.
Burow's Advancement Flap Text: The defect edges were debeveled with a #15 scalpel blade.  Given the location of the defect and the proximity to free margins a Burow's advancement flap was deemed most appropriate.  Using a sterile surgical marker, the appropriate advancement flap was drawn incorporating the defect and placing the expected incisions within the relaxed skin tension lines where possible.    The area thus outlined was incised deep to adipose tissue with a #15 scalpel blade.  The skin margins were undermined to an appropriate distance in all directions utilizing iris scissors.
Chonodrocutaneous Helical Advancement Flap Text: The defect edges were debeveled with a #15 scalpel blade. Given the location of the defect and the proximity to free margins a chondrocutaneous helical advancement flap was deemed most appropriate. Using a sterile surgical marker, the appropriate advancement flap was drawn incorporating the defect and placing the expected incisions within the relaxed skin tension lines where possible. The area thus outlined was incised deep to adipose tissue with a #15 scalpel blade. The skin margins were undermined to an appropriate distance in all directions utilizing iris scissors. Following this, the designed flap was advanced and carried over into the primary defect and sutured into place.
Crescentic Advancement Flap Text: The defect edges were debeveled with a #15 scalpel blade.  Given the location of the defect and the proximity to free margins a crescentic advancement flap was deemed most appropriate.  Using a sterile surgical marker, the appropriate advancement flap was drawn incorporating the defect and placing the expected incisions within the relaxed skin tension lines where possible.    The area thus outlined was incised deep to adipose tissue with a #15 scalpel blade.  The skin margins were undermined to an appropriate distance in all directions utilizing iris scissors.
A-T Advancement Flap Text: The defect edges were debeveled with a #15 scalpel blade.  Given the location of the defect, shape of the defect and the proximity to free margins an A-T advancement flap was deemed most appropriate.  Using a sterile surgical marker, an appropriate advancement flap was drawn incorporating the defect and placing the expected incisions within the relaxed skin tension lines where possible.    The area thus outlined was incised deep to adipose tissue with a #15 scalpel blade.  The skin margins were undermined to an appropriate distance in all directions utilizing iris scissors.
O-T Advancement Flap Text: The defect edges were debeveled with a #15 scalpel blade.  Given the location of the defect, shape of the defect and the proximity to free margins an O-T advancement flap was deemed most appropriate.  Using a sterile surgical marker, an appropriate advancement flap was drawn incorporating the defect and placing the expected incisions within the relaxed skin tension lines where possible.    The area thus outlined was incised deep to adipose tissue with a #15 scalpel blade.  The skin margins were undermined to an appropriate distance in all directions utilizing iris scissors.
O-L Flap Text: The defect edges were debeveled with a #15 scalpel blade.  Given the location of the defect, shape of the defect and the proximity to free margins an O-L flap was deemed most appropriate.  Using a sterile surgical marker, an appropriate advancement flap was drawn incorporating the defect and placing the expected incisions within the relaxed skin tension lines where possible.    The area thus outlined was incised deep to adipose tissue with a #15 scalpel blade.  The skin margins were undermined to an appropriate distance in all directions utilizing iris scissors.
O-Z Flap Text: The defect edges were debeveled with a #15 scalpel blade. Given the location of the defect, shape of the defect and the proximity to free margins an O-Z flap was deemed most appropriate. Using a sterile surgical marker, an appropriate transposition flap was drawn incorporating the defect and placing the expected incisions within the relaxed skin tension lines where possible. The area thus outlined was incised deep to adipose tissue with a #15 scalpel blade. The skin margins were undermined to an appropriate distance in all directions utilizing iris scissors. Following this, the designed flap was carried over into the primary defect and sutured into place.
Double O-Z Flap Text: The defect edges were debeveled with a #15 scalpel blade. Given the location of the defect, shape of the defect and the proximity to free margins a Double O-Z flap was deemed most appropriate. Using a sterile surgical marker, an appropriate transposition flap was drawn incorporating the defect and placing the expected incisions within the relaxed skin tension lines where possible. The area thus outlined was incised deep to adipose tissue with a #15 scalpel blade. The skin margins were undermined to an appropriate distance in all directions utilizing iris scissors. Following this, the designed flap was carried over into the primary defect and sutured into place.
V-Y Flap Text: The defect edges were debeveled with a #15 scalpel blade.  Given the location of the defect, shape of the defect and the proximity to free margins a V-Y flap was deemed most appropriate.  Using a sterile surgical marker, an appropriate advancement flap was drawn incorporating the defect and placing the expected incisions within the relaxed skin tension lines where possible.    The area thus outlined was incised deep to adipose tissue with a #15 scalpel blade.  The skin margins were undermined to an appropriate distance in all directions utilizing iris scissors.
Advancement-Rotation Flap Text: The defect edges were debeveled with a #15 scalpel blade.  Given the location of the defect, shape of the defect and the proximity to free margins an advancement-rotation flap was deemed most appropriate.  Using a sterile surgical marker, an appropriate flap was drawn incorporating the defect and placing the expected incisions within the relaxed skin tension lines where possible. The area thus outlined was incised deep to adipose tissue with a #15 scalpel blade.  The skin margins were undermined to an appropriate distance in all directions utilizing iris scissors.
Mercedes Flap Text: The defect edges were debeveled with a #15 scalpel blade.  Given the location of the defect, shape of the defect and the proximity to free margins a Mercedes flap was deemed most appropriate.  Using a sterile surgical marker, an appropriate advancement flap was drawn incorporating the defect and placing the expected incisions within the relaxed skin tension lines where possible. The area thus outlined was incised deep to adipose tissue with a #15 scalpel blade.  The skin margins were undermined to an appropriate distance in all directions utilizing iris scissors.
Modified Advancement Flap Text: The defect edges were debeveled with a #15 scalpel blade.  Given the location of the defect, shape of the defect and the proximity to free margins a modified advancement flap was deemed most appropriate.  Using a sterile surgical marker, an appropriate advancement flap was drawn incorporating the defect and placing the expected incisions within the relaxed skin tension lines where possible.    The area thus outlined was incised deep to adipose tissue with a #15 scalpel blade.  The skin margins were undermined to an appropriate distance in all directions utilizing iris scissors.
Mucosal Advancement Flap Text: Given the location of the defect, shape of the defect and the proximity to free margins a mucosal advancement flap was deemed most appropriate. Incisions were made with a 15 blade scalpel in the appropriate fashion along the cutaneous vermilion border and the mucosal lip. The remaining actinically damaged mucosal tissue was excised.  The mucosal advancement flap was then elevated to the gingival sulcus with care taken to preserve the neurovascular structures and advanced into the primary defect. Care was taken to ensure that precise realignment of the vermilion border was achieved.
Peng Advancement Flap Text: The defect edges were debeveled with a #15 scalpel blade. Given the location of the defect, shape of the defect and the proximity to free margins a Peng advancement flap was deemed most appropriate. Using a sterile surgical marker, an appropriate advancement flap was drawn incorporating the defect and placing the expected incisions within the relaxed skin tension lines where possible. The area thus outlined was incised deep to adipose tissue with a #15 scalpel blade. The skin margins were undermined to an appropriate distance in all directions utilizing iris scissors. Following this, the designed flap was advanced and carried over into the primary defect and sutured into place.
Hatchet Flap Text: The defect edges were debeveled with a #15 scalpel blade.  Given the location of the defect, shape of the defect and the proximity to free margins a hatchet flap was deemed most appropriate.  Using a sterile surgical marker, an appropriate hatchet flap was drawn incorporating the defect and placing the expected incisions within the relaxed skin tension lines where possible.    The area thus outlined was incised deep to adipose tissue with a #15 scalpel blade.  The skin margins were undermined to an appropriate distance in all directions utilizing iris scissors.
Rotation Flap Text: The defect edges were debeveled with a #15 scalpel blade.  Given the location of the defect, shape of the defect and the proximity to free margins a rotation flap was deemed most appropriate.  Using a sterile surgical marker, an appropriate rotation flap was drawn incorporating the defect and placing the expected incisions within the relaxed skin tension lines where possible.    The area thus outlined was incised deep to adipose tissue with a #15 scalpel blade.  The skin margins were undermined to an appropriate distance in all directions utilizing iris scissors.
Bilateral Rotation Flap Text: The defect edges were debeveled with a #15 scalpel blade. Given the location of the defect, shape of the defect and the proximity to free margins a bilateral rotation flap was deemed most appropriate. Using a sterile surgical marker, an appropriate rotation flap was drawn incorporating the defect and placing the expected incisions within the relaxed skin tension lines where possible. The area thus outlined was incised deep to adipose tissue with a #15 scalpel blade. The skin margins were undermined to an appropriate distance in all directions utilizing iris scissors. Following this, the designed flap was carried over into the primary defect and sutured into place.
Spiral Flap Text: The defect edges were debeveled with a #15 scalpel blade.  Given the location of the defect, shape of the defect and the proximity to free margins a spiral flap was deemed most appropriate.  Using a sterile surgical marker, an appropriate rotation flap was drawn incorporating the defect and placing the expected incisions within the relaxed skin tension lines where possible. The area thus outlined was incised deep to adipose tissue with a #15 scalpel blade.  The skin margins were undermined to an appropriate distance in all directions utilizing iris scissors.
Staged Advancement Flap Text: The defect edges were debeveled with a #15 scalpel blade. Given the location of the defect, shape of the defect and the proximity to free margins a staged advancement flap was deemed most appropriate. Using a sterile surgical marker, an appropriate advancement flap was drawn incorporating the defect and placing the expected incisions within the relaxed skin tension lines where possible. The area thus outlined was incised deep to adipose tissue with a #15 scalpel blade. The skin margins were undermined to an appropriate distance in all directions utilizing iris scissors. Following this, the designed flap was carried over into the primary defect and sutured into place.
Star Wedge Flap Text: The defect edges were debeveled with a #15 scalpel blade.  Given the location of the defect, shape of the defect and the proximity to free margins a star wedge flap was deemed most appropriate.  Using a sterile surgical marker, an appropriate rotation flap was drawn incorporating the defect and placing the expected incisions within the relaxed skin tension lines where possible. The area thus outlined was incised deep to adipose tissue with a #15 scalpel blade.  The skin margins were undermined to an appropriate distance in all directions utilizing iris scissors.
Transposition Flap Text: The defect edges were debeveled with a #15 scalpel blade.  Given the location of the defect and the proximity to free margins a transposition flap was deemed most appropriate.  Using a sterile surgical marker, an appropriate transposition flap was drawn incorporating the defect.    The area thus outlined was incised deep to adipose tissue with a #15 scalpel blade.  The skin margins were undermined to an appropriate distance in all directions utilizing iris scissors.
Muscle Hinge Flap Text: The defect edges were debeveled with a #15 scalpel blade.  Given the size, depth and location of the defect and the proximity to free margins a muscle hinge flap was deemed most appropriate.  Using a sterile surgical marker, an appropriate hinge flap was drawn incorporating the defect. The area thus outlined was incised with a #15 scalpel blade.  The skin margins were undermined to an appropriate distance in all directions utilizing iris scissors.
Mustarde Flap Text: The defect edges were debeveled with a #15 scalpel blade.  Given the size, depth and location of the defect and the proximity to free margins a Mustarde flap was deemed most appropriate. Using a sterile surgical marker, an appropriate flap was drawn incorporating the defect. The area thus outlined was incised with a #15 scalpel blade. The skin margins were undermined to an appropriate distance in all directions utilizing iris scissors. Following this, the designed flap was carried into the primary defect and sutured into place.
Nasal Turnover Hinge Flap Text: The defect edges were debeveled with a #15 scalpel blade.  Given the size, depth, location of the defect and the defect being full thickness a nasal turnover hinge flap was deemed most appropriate. Using a sterile surgical marker, an appropriate hinge flap was drawn incorporating the defect. The area thus outlined was incised with a #15 scalpel blade. The flap was designed to recreate the nasal mucosal lining and the alar rim. The skin margins were undermined to an appropriate distance in all directions utilizing iris scissors. Following this, the designed flap was carried over into the primary defect and sutured into place
Nasalis-Muscle-Based Myocutaneous Island Pedicle Flap Text: Using a #15 blade, an incision was made around the donor flap to the level of the nasalis muscle. Wide lateral undermining was then performed in both the subcutaneous plane above the nasalis muscle, and in a submuscular plane just above periosteum. This allowed the formation of a free nasalis muscle axial pedicle (based on the angular artery) which was still attached to the actual cutaneous flap, increasing its mobility and vascular viability. Hemostasis was obtained with pinpoint electrocoagulation. The flap was mobilized into position and the pivotal anchor points positioned and stabilized with buried interrupted sutures. Subcutaneous and dermal tissues were closed in a multilayered fashion with sutures. Tissue redundancies were excised, and the epidermal edges were apposed without significant tension and sutured with sutures.
Orbicularis Oris Muscle Flap Text: The defect edges were debeveled with a #15 scalpel blade.  Given that the defect affected the competency of the oral sphincter an orbicularis oris muscle flap was deemed most appropriate to restore this competency and normal muscle function.  Using a sterile surgical marker, an appropriate flap was drawn incorporating the defect. The area thus outlined was incised with a #15 scalpel blade. Following this, the designed flap was carried over into the primary defect and sutured into place.
Melolabial Transposition Flap Text: The defect edges were debeveled with a #15 scalpel blade.  Given the location of the defect and the proximity to free margins a melolabial flap was deemed most appropriate.  Using a sterile surgical marker, an appropriate melolabial transposition flap was drawn incorporating the defect.    The area thus outlined was incised deep to adipose tissue with a #15 scalpel blade.  The skin margins were undermined to an appropriate distance in all directions utilizing iris scissors.
Rhombic Flap Text: The defect edges were debeveled with a #15 scalpel blade.  Given the location of the defect and the proximity to free margins a rhombic flap was deemed most appropriate.  Using a sterile surgical marker, an appropriate rhombic flap was drawn incorporating the defect.    The area thus outlined was incised deep to adipose tissue with a #15 scalpel blade.  The skin margins were undermined to an appropriate distance in all directions utilizing iris scissors.
Rhomboid Transposition Flap Text: The defect edges were debeveled with a #15 scalpel blade. Given the location of the defect and the proximity to free margins a rhomboid transposition flap was deemed most appropriate. Using a sterile surgical marker, an appropriate rhomboid flap was drawn incorporating the defect. The area thus outlined was incised deep to adipose tissue with a #15 scalpel blade. The skin margins were undermined to an appropriate distance in all directions utilizing iris scissors. Following this, the designed flap was carried over into the primary defect and sutured into place.
Bi-Rhombic Flap Text: The defect edges were debeveled with a #15 scalpel blade.  Given the location of the defect and the proximity to free margins a bi-rhombic flap was deemed most appropriate.  Using a sterile surgical marker, an appropriate rhombic flap was drawn incorporating the defect. The area thus outlined was incised deep to adipose tissue with a #15 scalpel blade.  The skin margins were undermined to an appropriate distance in all directions utilizing iris scissors.
Helical Rim Advancement Flap Text: The defect edges were debeveled with a #15 blade scalpel.  Given the location of the defect and the proximity to free margins (helical rim) a double helical rim advancement flap was deemed most appropriate.  Using a sterile surgical marker, the appropriate advancement flaps were drawn incorporating the defect and placing the expected incisions between the helical rim and antihelix where possible.  The area thus outlined was incised through and through with a #15 scalpel blade.  With a skin hook and iris scissors, the flaps were gently and sharply undermined and freed up.
Bilateral Helical Rim Advancement Flap Text: The defect edges were debeveled with a #15 blade scalpel.  Given the location of the defect and the proximity to free margins (helical rim) a bilateral helical rim advancement flap was deemed most appropriate.  Using a sterile surgical marker, the appropriate advancement flaps were drawn incorporating the defect and placing the expected incisions between the helical rim and antihelix where possible.  The area thus outlined was incised through and through with a #15 scalpel blade.  With a skin hook and iris scissors, the flaps were gently and sharply undermined and freed up.
Ear Star Wedge Flap Text: The defect edges were debeveled with a #15 blade scalpel.  Given the location of the defect and the proximity to free margins (helical rim) an ear star wedge flap was deemed most appropriate.  Using a sterile surgical marker, the appropriate flap was drawn incorporating the defect and placing the expected incisions between the helical rim and antihelix where possible.  The area thus outlined was incised through and through with a #15 scalpel blade.
Banner Transposition Flap Text: The defect edges were debeveled with a #15 scalpel blade.  Given the location of the defect and the proximity to free margins a Banner transposition flap was deemed most appropriate.  Using a sterile surgical marker, an appropriate flap drawn around the defect. The area thus outlined was incised deep to adipose tissue with a #15 scalpel blade.  The skin margins were undermined to an appropriate distance in all directions utilizing iris scissors.
Bilobed Flap Text: The defect edges were debeveled with a #15 scalpel blade.  Given the location of the defect and the proximity to free margins a bilobe flap was deemed most appropriate.  Using a sterile surgical marker, an appropriate bilobe flap drawn around the defect.    The area thus outlined was incised deep to adipose tissue with a #15 scalpel blade.  The skin margins were undermined to an appropriate distance in all directions utilizing iris scissors.
Bilobed Transposition Flap Text: The defect edges were debeveled with a #15 scalpel blade.  Given the location of the defect and the proximity to free margins a bilobed transposition flap was deemed most appropriate.  Using a sterile surgical marker, an appropriate bilobe flap drawn around the defect.    The area thus outlined was incised deep to adipose tissue with a #15 scalpel blade.  The skin margins were undermined to an appropriate distance in all directions utilizing iris scissors.
Trilobed Flap Text: The defect edges were debeveled with a #15 scalpel blade.  Given the location of the defect and the proximity to free margins a trilobed flap was deemed most appropriate.  Using a sterile surgical marker, an appropriate trilobed flap drawn around the defect.    The area thus outlined was incised deep to adipose tissue with a #15 scalpel blade.  The skin margins were undermined to an appropriate distance in all directions utilizing iris scissors.
Dorsal Nasal Flap Text: The defect edges were debeveled with a #15 scalpel blade.  Given the location of the defect and the proximity to free margins a dorsal nasal flap was deemed most appropriate.  Using a sterile surgical marker, an appropriate dorsal nasal flap was drawn around the defect.    The area thus outlined was incised deep to adipose tissue with a #15 scalpel blade.  The skin margins were undermined to an appropriate distance in all directions utilizing iris scissors.
Island Pedicle Flap Text: The defect edges were debeveled with a #15 scalpel blade.  Given the location of the defect, shape of the defect and the proximity to free margins an island pedicle advancement flap was deemed most appropriate.  Using a sterile surgical marker, an appropriate advancement flap was drawn incorporating the defect, outlining the appropriate donor tissue and placing the expected incisions within the relaxed skin tension lines where possible.    The area thus outlined was incised deep to adipose tissue with a #15 scalpel blade.  The skin margins were undermined to an appropriate distance in all directions around the primary defect and laterally outward around the island pedicle utilizing iris scissors.  There was minimal undermining beneath the pedicle flap.
Island Pedicle Flap With Canthal Suspension Text: The defect edges were debeveled with a #15 scalpel blade.  Given the location of the defect, shape of the defect and the proximity to free margins an island pedicle advancement flap was deemed most appropriate.  Using a sterile surgical marker, an appropriate advancement flap was drawn incorporating the defect, outlining the appropriate donor tissue and placing the expected incisions within the relaxed skin tension lines where possible. The area thus outlined was incised deep to adipose tissue with a #15 scalpel blade.  The skin margins were undermined to an appropriate distance in all directions around the primary defect and laterally outward around the island pedicle utilizing iris scissors.  There was minimal undermining beneath the pedicle flap. A suspension suture was placed in the canthal tendon to prevent tension and prevent ectropion.
Alar Island Pedicle Flap Text: The defect edges were debeveled with a #15 scalpel blade.  Given the location of the defect, shape of the defect and the proximity to the alar rim an island pedicle advancement flap was deemed most appropriate.  Using a sterile surgical marker, an appropriate advancement flap was drawn incorporating the defect, outlining the appropriate donor tissue and placing the expected incisions within the nasal ala running parallel to the alar rim. The area thus outlined was incised with a #15 scalpel blade.  The skin margins were undermined minimally to an appropriate distance in all directions around the primary defect and laterally outward around the island pedicle utilizing iris scissors.  There was minimal undermining beneath the pedicle flap.
Double Island Pedicle Flap Text: The defect edges were debeveled with a #15 scalpel blade.  Given the location of the defect, shape of the defect and the proximity to free margins a double island pedicle advancement flap was deemed most appropriate.  Using a sterile surgical marker, an appropriate advancement flap was drawn incorporating the defect, outlining the appropriate donor tissue and placing the expected incisions within the relaxed skin tension lines where possible.    The area thus outlined was incised deep to adipose tissue with a #15 scalpel blade.  The skin margins were undermined to an appropriate distance in all directions around the primary defect and laterally outward around the island pedicle utilizing iris scissors.  There was minimal undermining beneath the pedicle flap.
Island Pedicle Flap-Requiring Vessel Identification Text: The defect edges were debeveled with a #15 scalpel blade.  Given the location of the defect, shape of the defect and the proximity to free margins an island pedicle advancement flap was deemed most appropriate.  Using a sterile surgical marker, an appropriate advancement flap was drawn, based on the axial vessel mentioned above, incorporating the defect, outlining the appropriate donor tissue and placing the expected incisions within the relaxed skin tension lines where possible.    The area thus outlined was incised deep to adipose tissue with a #15 scalpel blade.  The skin margins were undermined to an appropriate distance in all directions around the primary defect and laterally outward around the island pedicle utilizing iris scissors.  There was minimal undermining beneath the pedicle flap.
Keystone Flap Text: The defect edges were debeveled with a #15 scalpel blade.  Given the location of the defect, shape of the defect a keystone flap was deemed most appropriate.  Using a sterile surgical marker, an appropriate keystone flap was drawn incorporating the defect, outlining the appropriate donor tissue and placing the expected incisions within the relaxed skin tension lines where possible. The area thus outlined was incised deep to adipose tissue with a #15 scalpel blade.  The skin margins were undermined to an appropriate distance in all directions around the primary defect and laterally outward around the flap utilizing iris scissors.
O-T Plasty Text: The defect edges were debeveled with a #15 scalpel blade.  Given the location of the defect, shape of the defect and the proximity to free margins an O-T plasty was deemed most appropriate.  Using a sterile surgical marker, an appropriate O-T plasty was drawn incorporating the defect and placing the expected incisions within the relaxed skin tension lines where possible.    The area thus outlined was incised deep to adipose tissue with a #15 scalpel blade.  The skin margins were undermined to an appropriate distance in all directions utilizing iris scissors.
O-Z Plasty Text: The defect edges were debeveled with a #15 scalpel blade.  Given the location of the defect, shape of the defect and the proximity to free margins an O-Z plasty (double transposition flap) was deemed most appropriate.  Using a sterile surgical marker, the appropriate transposition flaps were drawn incorporating the defect and placing the expected incisions within the relaxed skin tension lines where possible.    The area thus outlined was incised deep to adipose tissue with a #15 scalpel blade.  The skin margins were undermined to an appropriate distance in all directions utilizing iris scissors.  Hemostasis was achieved with electrocautery.  The flaps were then transposed into place, one clockwise and the other counterclockwise, and anchored with interrupted buried subcutaneous sutures.
Double O-Z Plasty Text: The defect edges were debeveled with a #15 scalpel blade. Given the location of the defect, shape of the defect and the proximity to free margins a Double O-Z plasty (double transposition flap) was deemed most appropriate. Using a sterile surgical marker, the appropriate transposition flaps were drawn incorporating the defect and placing the expected incisions within the relaxed skin tension lines where possible. The area thus outlined was incised deep to adipose tissue with a #15 scalpel blade. The skin margins were undermined to an appropriate distance in all directions utilizing iris scissors. Hemostasis was achieved with electrocautery. The flaps were then transposed and carried over into place, one clockwise and the other counterclockwise, and anchored with interrupted buried subcutaneous sutures.
V-Y Plasty Text: The defect edges were debeveled with a #15 scalpel blade.  Given the location of the defect, shape of the defect and the proximity to free margins an V-Y advancement flap was deemed most appropriate.  Using a sterile surgical marker, an appropriate advancement flap was drawn incorporating the defect and placing the expected incisions within the relaxed skin tension lines where possible.    The area thus outlined was incised deep to adipose tissue with a #15 scalpel blade.  The skin margins were undermined to an appropriate distance in all directions utilizing iris scissors.
H Plasty Text: Given the location of the defect, shape of the defect and the proximity to free margins a H-plasty was deemed most appropriate for repair.  Using a sterile surgical marker, the appropriate advancement arms of the H-plasty were drawn incorporating the defect and placing the expected incisions within the relaxed skin tension lines where possible. The area thus outlined was incised deep to adipose tissue with a #15 scalpel blade. The skin margins were undermined to an appropriate distance in all directions utilizing iris scissors.  The opposing advancement arms were then advanced into place in opposite direction and anchored with interrupted buried subcutaneous sutures.
W Plasty Text: The lesion was extirpated to the level of the fat with a #15 scalpel blade.  Given the location of the defect, shape of the defect and the proximity to free margins a W-plasty was deemed most appropriate for repair.  Using a sterile surgical marker, the appropriate transposition arms of the W-plasty were drawn incorporating the defect and placing the expected incisions within the relaxed skin tension lines where possible.    The area thus outlined was incised deep to adipose tissue with a #15 scalpel blade.  The skin margins were undermined to an appropriate distance in all directions utilizing iris scissors.  The opposing transposition arms were then transposed into place in opposite direction and anchored with interrupted buried subcutaneous sutures.
Z Plasty Text: The lesion was extirpated to the level of the fat with a #15 scalpel blade.  Given the location of the defect, shape of the defect and the proximity to free margins a Z-plasty was deemed most appropriate for repair.  Using a sterile surgical marker, the appropriate transposition arms of the Z-plasty were drawn incorporating the defect and placing the expected incisions within the relaxed skin tension lines where possible.    The area thus outlined was incised deep to adipose tissue with a #15 scalpel blade.  The skin margins were undermined to an appropriate distance in all directions utilizing iris scissors.  The opposing transposition arms were then transposed into place in opposite direction and anchored with interrupted buried subcutaneous sutures.
Double Z Plasty Text: The lesion was extirpated to the level of the fat with a #15 scalpel blade. Given the location of the defect, shape of the defect and the proximity to free margins a double Z-plasty was deemed most appropriate for repair. Using a sterile surgical marker, the appropriate transposition arms of the double Z-plasty were drawn incorporating the defect and placing the expected incisions within the relaxed skin tension lines where possible. The area thus outlined was incised deep to adipose tissue with a #15 scalpel blade. The skin margins were undermined to an appropriate distance in all directions utilizing iris scissors. The opposing transposition arms were then transposed and carried over into place in opposite direction and anchored with interrupted buried subcutaneous sutures.
Zygomaticofacial Flap Text: Given the location of the defect, shape of the defect and the proximity to free margins a zygomaticofacial flap was deemed most appropriate for repair. Using a sterile surgical marker, the appropriate flap was drawn incorporating the defect and placing the expected incisions within the relaxed skin tension lines where possible. The area thus outlined was incised deep to adipose tissue with a #15 scalpel blade with preservation of a vascular pedicle.  The skin margins were undermined to an appropriate distance in all directions utilizing iris scissors. The flap was then carried over into the defect and anchored with interrupted buried subcutaneous sutures.
Cheek Interpolation Flap Text: A decision was made to reconstruct the defect utilizing an interpolation axial flap and a staged reconstruction.  A telfa template was made of the defect.  This telfa template was then used to outline the Cheek Interpolation flap.  The donor area for the pedicle flap was then injected with anesthesia.  The flap was excised through the skin and subcutaneous tissue down to the layer of the underlying musculature.  The interpolation flap was carefully excised within this deep plane to maintain its blood supply.  The edges of the donor site were undermined.   The donor site was closed in a primary fashion.  The pedicle was then rotated into position and sutured.  Once the tube was sutured into place, adequate blood supply was confirmed with blanching and refill.  The pedicle was then wrapped with xeroform gauze and dressed appropriately with a telfa and gauze bandage to ensure continued blood supply and protect the attached pedicle.
Cheek-To-Nose Interpolation Flap Text: A decision was made to reconstruct the defect utilizing an interpolation axial flap and a staged reconstruction.  A telfa template was made of the defect.  This telfa template was then used to outline the Cheek-To-Nose Interpolation flap.  The donor area for the pedicle flap was then injected with anesthesia.  The flap was excised through the skin and subcutaneous tissue down to the layer of the underlying musculature.  The interpolation flap was carefully excised within this deep plane to maintain its blood supply.  The edges of the donor site were undermined.   The donor site was closed in a primary fashion.  The pedicle was then rotated into position and sutured.  Once the tube was sutured into place, adequate blood supply was confirmed with blanching and refill.  The pedicle was then wrapped with xeroform gauze and dressed appropriately with a telfa and gauze bandage to ensure continued blood supply and protect the attached pedicle.
Interpolation Flap Text: A decision was made to reconstruct the defect utilizing an interpolation axial flap and a staged reconstruction.  A telfa template was made of the defect.  This telfa template was then used to outline the interpolation flap.  The donor area for the pedicle flap was then injected with anesthesia.  The flap was excised through the skin and subcutaneous tissue down to the layer of the underlying musculature.  The interpolation flap was carefully excised within this deep plane to maintain its blood supply.  The edges of the donor site were undermined.   The donor site was closed in a primary fashion.  The pedicle was then rotated into position and sutured.  Once the tube was sutured into place, adequate blood supply was confirmed with blanching and refill.  The pedicle was then wrapped with xeroform gauze and dressed appropriately with a telfa and gauze bandage to ensure continued blood supply and protect the attached pedicle.
Melolabial Interpolation Flap Text: A decision was made to reconstruct the defect utilizing an interpolation axial flap and a staged reconstruction.  A telfa template was made of the defect.  This telfa template was then used to outline the melolabial interpolation flap.  The donor area for the pedicle flap was then injected with anesthesia.  The flap was excised through the skin and subcutaneous tissue down to the layer of the underlying musculature.  The pedicle flap was carefully excised within this deep plane to maintain its blood supply.  The edges of the donor site were undermined.   The donor site was closed in a primary fashion.  The pedicle was then rotated into position and sutured.  Once the tube was sutured into place, adequate blood supply was confirmed with blanching and refill.  The pedicle was then wrapped with xeroform gauze and dressed appropriately with a telfa and gauze bandage to ensure continued blood supply and protect the attached pedicle.
Mastoid Interpolation Flap Text: A decision was made to reconstruct the defect utilizing an interpolation axial flap and a staged reconstruction.  A telfa template was made of the defect.  This telfa template was then used to outline the mastoid interpolation flap.  The donor area for the pedicle flap was then injected with anesthesia.  The flap was excised through the skin and subcutaneous tissue down to the layer of the underlying musculature.  The pedicle flap was carefully excised within this deep plane to maintain its blood supply.  The edges of the donor site were undermined.   The donor site was closed in a primary fashion.  The pedicle was then rotated into position and sutured.  Once the tube was sutured into place, adequate blood supply was confirmed with blanching and refill.  The pedicle was then wrapped with xeroform gauze and dressed appropriately with a telfa and gauze bandage to ensure continued blood supply and protect the attached pedicle.
Posterior Auricular Interpolation Flap Text: A decision was made to reconstruct the defect utilizing an interpolation axial flap and a staged reconstruction.  A telfa template was made of the defect.  This telfa template was then used to outline the posterior auricular interpolation flap.  The donor area for the pedicle flap was then injected with anesthesia.  The flap was excised through the skin and subcutaneous tissue down to the layer of the underlying musculature.  The pedicle flap was carefully excised within this deep plane to maintain its blood supply.  The edges of the donor site were undermined.   The donor site was closed in a primary fashion.  The pedicle was then rotated into position and sutured.  Once the tube was sutured into place, adequate blood supply was confirmed with blanching and refill.  The pedicle was then wrapped with xeroform gauze and dressed appropriately with a telfa and gauze bandage to ensure continued blood supply and protect the attached pedicle.
Paramedian Forehead Flap Text: A decision was made to reconstruct the defect utilizing an interpolation axial flap and a staged reconstruction.  A telfa template was made of the defect.  This telfa template was then used to outline the paramedian forehead pedicle flap.  The donor area for the pedicle flap was then injected with anesthesia.  The flap was excised through the skin and subcutaneous tissue down to the layer of the underlying musculature.  The pedicle flap was carefully excised within this deep plane to maintain its blood supply.  The edges of the donor site were undermined.   The donor site was closed in a primary fashion.  The pedicle was then rotated into position and sutured.  Once the tube was sutured into place, adequate blood supply was confirmed with blanching and refill.  The pedicle was then wrapped with xeroform gauze and dressed appropriately with a telfa and gauze bandage to ensure continued blood supply and protect the attached pedicle.
Abbe Flap (Upper To Lower Lip) Text: The defect of the lower lip was assessed and measured.  Given the location and size of the defect, an Abbe flap was deemed most appropriate. Using a sterile surgical marker, an appropriate Abbe flap was measured and drawn on the upper lip. Local anesthesia was then infiltrated.  A scalpel was then used to incise the upper lip through and through the skin, vermilion, muscle and mucosa, leaving the flap pedicled on the opposite side.  The flap was then rotated and transferred to the lower lip defect.  The flap was then sutured into place with a three layer technique, closing the orbicularis oris muscle layer with subcutaneous buried sutures, followed by a mucosal layer and an epidermal layer.
Abbe Flap (Lower To Upper Lip) Text: The defect of the upper lip was assessed and measured.  Given the location and size of the defect, an Abbe flap was deemed most appropriate. Using a sterile surgical marker, an appropriate Abbe flap was measured and drawn on the lower lip. Local anesthesia was then infiltrated. A scalpel was then used to incise the upper lip through and through the skin, vermilion, muscle and mucosa, leaving the flap pedicled on the opposite side.  The flap was then rotated and transferred to the lower lip defect.  The flap was then sutured into place with a three layer technique, closing the orbicularis oris muscle layer with subcutaneous buried sutures, followed by a mucosal layer and an epidermal layer.
Estlander Flap (Upper To Lower Lip) Text: The defect of the lower lip was assessed and measured.  Given the location and size of the defect, an Estlander flap was deemed most appropriate. Using a sterile surgical marker, an appropriate Estlander flap was measured and drawn on the upper lip. Local anesthesia was then infiltrated. A scalpel was then used to incise the lateral aspect of the flap, through skin, muscle and mucosa, leaving the flap pedicled medially.  The flap was then rotated and positioned to fill the lower lip defect.  The flap was then sutured into place with a three layer technique, closing the orbicularis oris muscle layer with subcutaneous buried sutures, followed by a mucosal layer and an epidermal layer.
Cheiloplasty (Less Than 50%) Text: A decision was made to reconstruct the defect with a  cheiloplasty.  The defect was undermined extensively.  Additional obicularis oris muscle was excised with a 15 blade scalpel.  The defect was converted into a full thickness wedge, of less than 50% of the vertical height of the lip, to facilite a better cosmetic result.  Small vessels were then tied off with 5-0 monocyrl. The obicularis oris, superficial fascia, adipose and dermis were then reapproximated.  After the deeper layers were approximated the epidermis was reapproximated with particular care given to realign the vermilion border.
Cheiloplasty (Complex) Text: A decision was made to reconstruct the defect with a  cheiloplasty.  The defect was undermined extensively.  Additional obicularis oris muscle was excised with a 15 blade scalpel.  The defect was converted into a full thickness wedge to facilite a better cosmetic result.  Small vessels were then tied off with 5-0 monocyrl. The obicularis oris, superficial fascia, adipose and dermis were then reapproximated.  After the deeper layers were approximated the epidermis was reapproximated with particular care given to realign the vermilion border.
Ear Wedge Repair Text: A wedge excision was completed by carrying down an excision through the full thickness of the ear and cartilage with an inward facing Burow's triangle. The wound was then closed in a layered fashion.
Full Thickness Lip Wedge Repair (Flap) Text: Given the location of the defect and the proximity to free margins a full thickness wedge repair was deemed most appropriate.  Using a sterile surgical marker, the appropriate repair was drawn incorporating the defect and placing the expected incisions perpendicular to the vermilion border.  The vermilion border was also meticulously outlined to ensure appropriate reapproximation during the repair.  The area thus outlined was incised through and through with a #15 scalpel blade.  The muscularis and dermis were reaproximated with deep sutures following hemostasis. Care was taken to realign the vermilion border before proceeding with the superficial closure.  Once the vermilion was realigned the superfical and mucosal closure was finished.
Ftsg Text: The defect edges were debeveled with a #15 scalpel blade.  Given the location of the defect, shape of the defect and the proximity to free margins a full thickness skin graft was deemed most appropriate.  Using a sterile surgical marker, the primary defect shape was transferred to the donor site. The area thus outlined was incised deep to adipose tissue with a #15 scalpel blade.  The harvested graft was then trimmed of adipose tissue until only dermis and epidermis was left.  The skin margins of the secondary defect were undermined to an appropriate distance in all directions utilizing iris scissors.  The secondary defect was closed with interrupted buried subcutaneous sutures.  The skin edges were then re-apposed with running  sutures.  The skin graft was then placed in the primary defect and oriented appropriately.
Split-Thickness Skin Graft Text: The defect edges were debeveled with a #15 scalpel blade.  Given the location of the defect, shape of the defect and the proximity to free margins a split thickness skin graft was deemed most appropriate.  Using a sterile surgical marker, the primary defect shape was transferred to the donor site. The split thickness graft was then harvested.  The skin graft was then placed in the primary defect and oriented appropriately.
Pinch Graft Text: The defect edges were debeveled with a #15 scalpel blade. Given the location of the defect, shape of the defect and the proximity to free margins a pinch graft was deemed most appropriate. Using a sterile surgical marker, the primary defect shape was transferred to the donor site. The area thus outlined was incised deep to adipose tissue with a #15 scalpel blade.  The harvested graft was then trimmed of adipose tissue until only dermis and epidermis was left. The skin margins of the secondary defect were undermined to an appropriate distance in all directions utilizing iris scissors.  The secondary defect was closed with interrupted buried subcutaneous sutures.  The skin edges were then re-apposed with running  sutures.  The skin graft was then placed in the primary defect and oriented appropriately.
Burow's Graft Text: The defect edges were debeveled with a #15 scalpel blade. Given the location of the defect, shape of the defect, the proximity to free margins and the presence of a standing cone deformity a Burow's skin graft was deemed most appropriate. The standing cone was removed and this tissue was then trimmed to the shape of the primary defect. The adipose tissue was also removed until only dermis and epidermis were left.  The skin margins of the secondary defect were undermined to an appropriate distance in all directions utilizing iris scissors.  The secondary defect was closed with interrupted buried subcutaneous sutures.  The skin edges were then re-apposed with running  sutures.  The skin graft was then placed in the primary defect and oriented appropriately.
Cartilage Graft Text: The defect edges were debeveled with a #15 scalpel blade.  Given the location of the defect, shape of the defect, the fact the defect involved a full thickness cartilage defect a cartilage graft was deemed most appropriate.  An appropriate donor site was identified, cleansed, and anesthetized. The cartilage graft was then harvested and transferred to the recipient site, oriented appropriately and then sutured into place.  The secondary defect was then repaired using a primary closure.
Composite Graft Text: The defect edges were debeveled with a #15 scalpel blade.  Given the location of the defect, shape of the defect, the proximity to free margins and the fact the defect was full thickness a composite graft was deemed most appropriate.  The defect was outline and then transferred to the donor site.  A full thickness graft was then excised from the donor site. The graft was then placed in the primary defect, oriented appropriately and then sutured into place.  The secondary defect was then repaired using a primary closure.
Epidermal Autograft Text: The defect edges were debeveled with a #15 scalpel blade.  Given the location of the defect, shape of the defect and the proximity to free margins an epidermal autograft was deemed most appropriate.  Using a sterile surgical marker, the primary defect shape was transferred to the donor site. The epidermal graft was then harvested.  The skin graft was then placed in the primary defect and oriented appropriately.
Dermal Autograft Text: The defect edges were debeveled with a #15 scalpel blade.  Given the location of the defect, shape of the defect and the proximity to free margins a dermal autograft was deemed most appropriate.  Using a sterile surgical marker, the primary defect shape was transferred to the donor site. The area thus outlined was incised deep to adipose tissue with a #15 scalpel blade.  The harvested graft was then trimmed of adipose and epidermal tissue until only dermis was left.  The skin graft was then placed in the primary defect and oriented appropriately.
Skin Substitute Text: The defect edges were debeveled with a #15 scalpel blade.  Given the location of the defect, shape of the defect and the proximity to free margins a skin substitute graft was deemed most appropriate.  The graft material was trimmed to fit the size of the defect. The graft was then placed in the primary defect and oriented appropriately.
Tissue Cultured Epidermal Autograft Text: The defect edges were debeveled with a #15 scalpel blade.  Given the location of the defect, shape of the defect and the proximity to free margins a tissue cultured epidermal autograft was deemed most appropriate.  The graft was then trimmed to fit the size of the defect.  The graft was then placed in the primary defect and oriented appropriately.
Xenograft Text: The defect edges were debeveled with a #15 scalpel blade.  Given the location of the defect, shape of the defect and the proximity to free margins a xenograft was deemed most appropriate.  The graft was then trimmed to fit the size of the defect.  The graft was then placed in the primary defect and oriented appropriately.
Purse String (Simple) Text: Given the location of the defect and the characteristics of the surrounding skin a purse string closure was deemed most appropriate.  Undermining was performed circumfirentially around the surgical defect.  A purse string suture was then placed and tightened.
Purse String (Intermediate) Text: Given the location of the defect and the characteristics of the surrounding skin a purse string intermediate closure was deemed most appropriate.  Undermining was performed circumfirentially around the surgical defect.  A purse string suture was then placed and tightened.
Partial Purse String (Simple) Text: Given the location of the defect and the characteristics of the surrounding skin a simple purse string closure was deemed most appropriate.  Undermining was performed circumfirentially around the surgical defect.  A purse string suture was then placed and tightened. Wound tension only allowed a partial closure of the circular defect.
Partial Purse String (Intermediate) Text: Given the location of the defect and the characteristics of the surrounding skin an intermediate purse string closure was deemed most appropriate.  Undermining was performed circumfirentially around the surgical defect.  A purse string suture was then placed and tightened. Wound tension only allowed a partial closure of the circular defect.
Localized Dermabrasion With Wire Brush Text: The patient was draped in routine manner.  Localized dermabrasion using 3 x 17 mm wire brush was performed in routine manner to papillary dermis. This spot dermabrasion is being performed to complete skin cancer reconstruction. It also will eliminate the other sun damaged precancerous cells that are known to be part of the regional effect of a lifetime's worth of sun exposure. This localized dermabrasion is therapeutic and should not be considered cosmetic in any regard.
Tarsorrhaphy Text: A tarsorrhaphy was performed using Frost sutures.
Intermediate Repair And Flap Additional Text (Will Appearing After The Standard Complex Repair Text): The intermediate repair was not sufficient to completely close the primary defect. The remaining additional defect was repaired with the flap mentioned below.
Intermediate Repair And Graft Additional Text (Will Appearing After The Standard Complex Repair Text): The intermediate repair was not sufficient to completely close the primary defect. The remaining additional defect was repaired with the graft mentioned below.
Complex Repair And Flap Additional Text (Will Appearing After The Standard Complex Repair Text): The complex repair was not sufficient to completely close the primary defect. The remaining additional defect was repaired with the flap mentioned below.
Complex Repair And Graft Additional Text (Will Appearing After The Standard Complex Repair Text): The complex repair was not sufficient to completely close the primary defect. The remaining additional defect was repaired with the graft mentioned below.
Manual Repair Warning Statement: We plan on removing the manually selected variable below in favor of our much easier automatic structured text blocks found in the previous tab. We decided to do this to help make the flow better and give you the full power of structured data. Manual selection is never going to be ideal in our platform and I would encourage you to avoid using manual selection from this point on, especially since I will be sunsetting this feature. It is important that you do one of two things with the customized text below. First, you can save all of the text in a word file so you can have it for future reference. Second, transfer the text to the appropriate area in the Library tab. Lastly, if there is a flap or graft type which we do not have you need to let us know right away so I can add it in before the variable is hidden. No need to panic, we plan to give you roughly 6 months to make the change.
Same Histology In Subsequent Stages Text: The pattern and morphology of the tumor is as described in the first stage.
No Residual Tumor Seen Histology Text: There were no malignant cells seen in the sections examined.
Inflammation Suggestive Of Cancer Camouflage Histology Text: There was a dense lymphocytic infiltrate which prevented adequate histologic evaluation of adjacent structures.
Bcc Histology Text: There were numerous aggregates of basaloid cells.
Bcc Infiltrative Histology Text: There were numerous aggregates of basaloid cells demonstrating an infiltrative pattern.
Mart-1 - Positive Histology Text: MART-1 staining demonstrates areas of higher density and clustering of melanocytes with Pagetoid spread upwards within the epidermis. The surgical margins are positive for tumor cells.
Mart-1 - Negative Histology Text: MART-1 staining demonstrates a normal density and pattern of melanocytes along the dermal-epidermal junction. The surgical margins are negative for tumor cells.
Information: Selecting Yes will display possible errors in your note based on the variables you have selected. This validation is only offered as a suggestion for you. PLEASE NOTE THAT THE VALIDATION TEXT WILL BE REMOVED WHEN YOU FINALIZE YOUR NOTE. IF YOU WANT TO FAX A PRELIMINARY NOTE YOU WILL NEED TO TOGGLE THIS TO 'NO' IF YOU DO NOT WANT IT IN YOUR FAXED NOTE.

## 2023-06-29 ENCOUNTER — APPOINTMENT (RX ONLY)
Dept: URBAN - METROPOLITAN AREA CLINIC 22 | Facility: CLINIC | Age: 78
Setting detail: DERMATOLOGY
End: 2023-06-29

## 2023-06-29 DIAGNOSIS — Z48.817 ENCOUNTER FOR SURGICAL AFTERCARE FOLLOWING SURGERY ON THE SKIN AND SUBCUTANEOUS TISSUE: ICD-10-CM

## 2023-06-29 PROCEDURE — 99024 POSTOP FOLLOW-UP VISIT: CPT

## 2023-06-29 PROCEDURE — ? POST-OP WOUND EVALUATION

## 2023-06-29 ASSESSMENT — LOCATION SIMPLE DESCRIPTION DERM: LOCATION SIMPLE: POSTERIOR NECK

## 2023-06-29 ASSESSMENT — LOCATION DETAILED DESCRIPTION DERM: LOCATION DETAILED: LEFT SUPERIOR POSTERIOR NECK

## 2023-06-29 ASSESSMENT — LOCATION ZONE DERM: LOCATION ZONE: NECK

## 2023-06-29 NOTE — PROCEDURE: POST-OP WOUND EVALUATION
Detail Level: Detailed
Add 16047 Cpt? (Important Note: In 2017 The Use Of 47251 Is Being Tracked By Cms To Determine Future Global Period Reimbursement For Global Periods): yes
Wound Evaluated By (Optional): Reynold Fountain MD
Wound Diameter In Cm(Optional): 0
Wound Crusting?: clean
Sutures?: intact
Wound Edema?: mild
Wound Color?: pink
Any New Or Residual Neoplasm?: No

## 2023-08-22 ENCOUNTER — OFFICE VISIT (OUTPATIENT)
Dept: URGENT CARE | Facility: PHYSICIAN GROUP | Age: 78
End: 2023-08-22
Payer: COMMERCIAL

## 2023-08-22 VITALS
RESPIRATION RATE: 18 BRPM | OXYGEN SATURATION: 94 % | SYSTOLIC BLOOD PRESSURE: 130 MMHG | BODY MASS INDEX: 25.62 KG/M2 | DIASTOLIC BLOOD PRESSURE: 64 MMHG | TEMPERATURE: 97 F | HEART RATE: 85 BPM | HEIGHT: 71 IN | WEIGHT: 183 LBS

## 2023-08-22 DIAGNOSIS — R05.1 ACUTE COUGH: ICD-10-CM

## 2023-08-22 DIAGNOSIS — B34.9 NONSPECIFIC SYNDROME SUGGESTIVE OF VIRAL ILLNESS: ICD-10-CM

## 2023-08-22 LAB
FLUAV RNA SPEC QL NAA+PROBE: NEGATIVE
FLUBV RNA SPEC QL NAA+PROBE: NEGATIVE
RSV RNA SPEC QL NAA+PROBE: NEGATIVE
SARS-COV-2 RNA RESP QL NAA+PROBE: NEGATIVE

## 2023-08-22 PROCEDURE — 0241U POCT CEPHEID COV-2, FLU A/B, RSV - PCR: CPT | Performed by: PHYSICIAN ASSISTANT

## 2023-08-22 PROCEDURE — 3075F SYST BP GE 130 - 139MM HG: CPT | Performed by: PHYSICIAN ASSISTANT

## 2023-08-22 PROCEDURE — 99213 OFFICE O/P EST LOW 20 MIN: CPT | Performed by: PHYSICIAN ASSISTANT

## 2023-08-22 PROCEDURE — 3078F DIAST BP <80 MM HG: CPT | Performed by: PHYSICIAN ASSISTANT

## 2023-08-22 RX ORDER — BENZONATATE 200 MG/1
200 CAPSULE ORAL 3 TIMES DAILY PRN
Qty: 60 CAPSULE | Refills: 0 | Status: SHIPPED | OUTPATIENT
Start: 2023-08-22 | End: 2023-10-16

## 2023-08-22 RX ORDER — GUAIFENESIN 600 MG/1
600 TABLET, EXTENDED RELEASE ORAL EVERY 12 HOURS
Qty: 28 TABLET | Refills: 0 | Status: SHIPPED | OUTPATIENT
Start: 2023-08-22 | End: 2023-10-16

## 2023-08-22 ASSESSMENT — ENCOUNTER SYMPTOMS
COUGH: 1
FEVER: 0
CHILLS: 0
SORE THROAT: 0
RHINORRHEA: 1
MYALGIAS: 1
SHORTNESS OF BREATH: 0
WHEEZING: 0
HEADACHES: 0
HEMOPTYSIS: 0

## 2023-08-22 ASSESSMENT — COPD QUESTIONNAIRES: COPD: 1

## 2023-08-22 NOTE — PROGRESS NOTES
Subjective:   Florin Brooks is a 77 y.o. male who presents for Cough (X 6 days cough, chest congestion, fatigue, sneezing)        Cough  This is a new problem. Episode onset: 5 days (started friday night) The problem has been unchanged. The problem occurs every few minutes. The cough is Productive of sputum. Associated symptoms include myalgias, nasal congestion and rhinorrhea. Pertinent negatives include no chest pain, chills, ear pain, fever, headaches, hemoptysis, sore throat, shortness of breath or wheezing. Associated symptoms comments: fatigue. Treatments tried: theraflu. The treatment provided mild relief. His past medical history is significant for COPD (questionable diagnosis. no longer being treated for this). There is no history of asthma.     Review of Systems   Constitutional:  Negative for chills and fever.   HENT:  Positive for rhinorrhea. Negative for ear pain and sore throat.    Respiratory:  Positive for cough. Negative for hemoptysis, shortness of breath and wheezing.    Cardiovascular:  Negative for chest pain.   Musculoskeletal:  Positive for myalgias.   Neurological:  Negative for headaches.       PMH:  has a past medical history of COPD (chronic obstructive pulmonary disease) (HCC) (7/5/2013), ED (erectile dysfunction) (7/5/2013), Osteoarthritis (7/5/2013), Shingles, and Shortness of breath (7/5/2013).  MEDS:   Current Outpatient Medications:     benzonatate (TESSALON) 200 MG capsule, Take 1 Capsule by mouth 3 times a day as needed for Cough., Disp: 60 Capsule, Rfl: 0    guaiFENesin ER (MUCINEX) 600 MG TABLET SR 12 HR, Take 1 Tablet by mouth every 12 hours., Disp: 28 Tablet, Rfl: 0    carbidopa-levodopa (SINEMET)  MG Tab, Take 1 Tablet by mouth 3 times a day., Disp: , Rfl:     lisinopril-hydrochlorothiazide (PRINZIDE) 10-12.5 MG per tablet, Take 1 Tablet by mouth every day., Disp: 90 Tablet, Rfl: 3    cetirizine (ZYRTEC) 10 MG Tab, Take 10 mg by mouth every day., Disp: , Rfl:  "  ALLERGIES:   Allergies   Allergen Reactions    Flagyl [Metronidazole Hcl] Anaphylaxis     SURGHX:   Past Surgical History:   Procedure Laterality Date    COLON RESECTION      EYE SURGERY      muscle surgery    FOOT SURGERY      neuroma/ganglion cyst    HERNIA REPAIR      KNEE ARTHROSCOPY      bilaterally     SOCHX:  reports that he quit smoking about 24 years ago. His smoking use included cigarettes. He has never used smokeless tobacco. He reports that he does not currently use drugs after having used the following drugs: Marijuana and Oral. He reports that he does not drink alcohol.  FH: Family history was reviewed, no pertinent findings to report   Objective:   /64 (BP Location: Right arm, Patient Position: Sitting, BP Cuff Size: Adult)   Pulse 85   Temp 36.1 °C (97 °F) (Temporal)   Resp 18   Ht 1.803 m (5' 11\")   Wt 83 kg (183 lb)   SpO2 94%   BMI 25.52 kg/m²   Physical Exam  Vitals reviewed.   Constitutional:       General: He is not in acute distress.     Appearance: Normal appearance. He is well-developed. He is not toxic-appearing.   HENT:      Head: Normocephalic and atraumatic.      Right Ear: Tympanic membrane, ear canal and external ear normal.      Left Ear: Tympanic membrane, ear canal and external ear normal.      Nose: Congestion and rhinorrhea present. Rhinorrhea is clear.      Mouth/Throat:      Lips: Pink.      Mouth: Mucous membranes are moist.      Pharynx: Oropharynx is clear. Uvula midline. Posterior oropharyngeal erythema present.   Cardiovascular:      Rate and Rhythm: Normal rate and regular rhythm.      Heart sounds: Normal heart sounds, S1 normal and S2 normal.   Pulmonary:      Effort: Pulmonary effort is normal. No respiratory distress.      Breath sounds: Normal breath sounds. No stridor. No decreased breath sounds, wheezing, rhonchi or rales.   Skin:     General: Skin is dry.   Neurological:      Comments: Alert and oriented.    Psychiatric:         Speech: Speech " normal.         Behavior: Behavior normal.           Results for orders placed or performed in visit on 08/22/23   POCT CoV-2, Flu A/B, RSV by PCR   Result Value Ref Range    SARS-CoV-2 by PCR Negative Negative, Invalid    Influenza virus A RNA Negative Negative, Invalid    Influenza virus B, PCR Negative Negative, Invalid    RSV, PCR Negative Negative, Invalid       Assessment/Plan:   1. Nonspecific syndrome suggestive of viral illness  - benzonatate (TESSALON) 200 MG capsule; Take 1 Capsule by mouth 3 times a day as needed for Cough.  Dispense: 60 Capsule; Refill: 0  - guaiFENesin ER (MUCINEX) 600 MG TABLET SR 12 HR; Take 1 Tablet by mouth every 12 hours.  Dispense: 28 Tablet; Refill: 0    2. Acute cough  - POCT CoV-2, Flu A/B, RSV by PCR  - benzonatate (TESSALON) 200 MG capsule; Take 1 Capsule by mouth 3 times a day as needed for Cough.  Dispense: 60 Capsule; Refill: 0  - guaiFENesin ER (MUCINEX) 600 MG TABLET SR 12 HR; Take 1 Tablet by mouth every 12 hours.  Dispense: 28 Tablet; Refill: 0    Considerations include but not limited to viral URI, sinusitis, allergic rhinitis, influenza, COVID-19, COPD exacerbation.  Testing for COVID-19, influenza, RSV is negative.  No evidence of lower respiratory involvement on exam today.      Recommend symptomatic care.  12-hour Mucinex as needed for congestion.  May also perform nasal saline rinses 2-3 times a day and begin Flonase daily.  Recommend salt water gargles, lozenges, hot tea with honey, and ibuprofen as needed for sore throat.  Tylenol or ibuprofen as needed for fever control, body aches, and headaches.  Ensure adequate rest and hydration.    If symptoms fail to improve within 72 hours, new symptoms develop, symptoms worsen return to clinic or see PCP for reevaluation.

## 2023-10-16 ENCOUNTER — OFFICE VISIT (OUTPATIENT)
Dept: MEDICAL GROUP | Facility: PHYSICIAN GROUP | Age: 78
End: 2023-10-16
Payer: COMMERCIAL

## 2023-10-16 VITALS
OXYGEN SATURATION: 99 % | HEART RATE: 60 BPM | WEIGHT: 182 LBS | DIASTOLIC BLOOD PRESSURE: 70 MMHG | HEIGHT: 71 IN | TEMPERATURE: 97.1 F | RESPIRATION RATE: 18 BRPM | SYSTOLIC BLOOD PRESSURE: 114 MMHG | BODY MASS INDEX: 25.48 KG/M2

## 2023-10-16 DIAGNOSIS — E55.9 VITAMIN D DEFICIENCY: ICD-10-CM

## 2023-10-16 DIAGNOSIS — R73.03 PREDIABETES: Chronic | ICD-10-CM

## 2023-10-16 DIAGNOSIS — Z91.09 ENVIRONMENTAL ALLERGIES: Chronic | ICD-10-CM

## 2023-10-16 DIAGNOSIS — E78.5 DYSLIPIDEMIA: ICD-10-CM

## 2023-10-16 DIAGNOSIS — J44.9 CHRONIC OBSTRUCTIVE PULMONARY DISEASE, UNSPECIFIED COPD TYPE (HCC): ICD-10-CM

## 2023-10-16 DIAGNOSIS — E87.6 HYPOKALEMIA: ICD-10-CM

## 2023-10-16 DIAGNOSIS — I10 ESSENTIAL HYPERTENSION: ICD-10-CM

## 2023-10-16 DIAGNOSIS — G20.A1 PARKINSON'S DISEASE, UNSPECIFIED WHETHER DYSKINESIA PRESENT, UNSPECIFIED WHETHER MANIFESTATIONS FLUCTUATE (HCC): Chronic | ICD-10-CM

## 2023-10-16 PROCEDURE — 99215 OFFICE O/P EST HI 40 MIN: CPT | Performed by: INTERNAL MEDICINE

## 2023-10-16 PROCEDURE — 3078F DIAST BP <80 MM HG: CPT | Performed by: INTERNAL MEDICINE

## 2023-10-16 PROCEDURE — 3074F SYST BP LT 130 MM HG: CPT | Performed by: INTERNAL MEDICINE

## 2023-10-16 RX ORDER — LISINOPRIL AND HYDROCHLOROTHIAZIDE 12.5; 1 MG/1; MG/1
1 TABLET ORAL DAILY
Qty: 90 TABLET | Refills: 3 | Status: SHIPPED | OUTPATIENT
Start: 2023-10-16

## 2023-10-16 ASSESSMENT — PATIENT HEALTH QUESTIONNAIRE - PHQ9: CLINICAL INTERPRETATION OF PHQ2 SCORE: 0

## 2023-10-16 NOTE — ASSESSMENT & PLAN NOTE
Chronic ongoing condition.  Patient quit smoking several years ago.  Currently the patient denies shortness of breath or wheezing.

## 2023-10-16 NOTE — ASSESSMENT & PLAN NOTE
Chronic condition.  Patient has been followed by neurology service.  Patient is being treated with Sinemet 3 times a day.  Symptoms are well controlled.  No significant side effects reported.

## 2023-10-16 NOTE — ASSESSMENT & PLAN NOTE
Chronic condition.  Patient takes lisinopril 10 and hydrochlorothiazide 12.5 Mg daily.  Blood pressure has been well controlled.  No significant side effects reported.

## 2023-10-16 NOTE — PROGRESS NOTES
PRIMARY CARE CLINIC VISIT        Chief Complaint   Patient presents with    Establish Care      New patient here to establish care  Parkinson's disease  COPD  Allergies  Hypertension  Prediabetes  Hyperlipidemia  Hypokalemia      History of Present Illness     Parkinson disease  Chronic condition.  Patient has been followed by neurology service.  Patient is being treated with Sinemet 3 times a day.  Symptoms are well controlled.  No significant side effects reported.    COPD (chronic obstructive pulmonary disease) (HCC)  Chronic ongoing condition.  Patient quit smoking several years ago.  Currently the patient denies shortness of breath or wheezing.    Environmental allergies  Chronic ongoing condition.  The patient takes Zyrtec as needed.  Symptoms are well controlled.  The patient denies fever chills shortness of breath or wheezing.    Essential hypertension  Chronic condition.  Patient takes lisinopril 10 and hydrochlorothiazide 12.5 Mg daily.  Blood pressure has been well controlled.  No significant side effects reported.    Prediabetes  Chronic condition.  The patient currently on diet therapy.  Lab test requested for follow-up.    Dyslipidemia  Chronic condition.  Patient currently on diet therapy.  Blood test ordered for follow-up.    Hypokalemia  This is a new condition.  Noted with lab review.  Lab test requested for follow-up.    Current Outpatient Medications on File Prior to Visit   Medication Sig Dispense Refill    carbidopa-levodopa (SINEMET)  MG Tab Take 1 Tablet by mouth 3 times a day.      cetirizine (ZYRTEC) 10 MG Tab Take 10 mg by mouth every day.       No current facility-administered medications on file prior to visit.        Allergies: Flagyl [metronidazole hcl]    Current Outpatient Medications Ordered in Epic   Medication Sig Dispense Refill    lisinopril-hydrochlorothiazide (PRINZIDE) 10-12.5 MG per tablet Take 1 Tablet by mouth every day. 90 Tablet 3    carbidopa-levodopa (SINEMET)  " MG Tab Take 1 Tablet by mouth 3 times a day.      cetirizine (ZYRTEC) 10 MG Tab Take 10 mg by mouth every day.       No current [facility]-ordered facility-administered medications on file.       Past Medical History:   Diagnosis Date    COPD (chronic obstructive pulmonary disease) (HCC) 2013    ED (erectile dysfunction) 2013    Osteoarthritis 2013    Shingles     Shortness of breath 2013       Past Surgical History:   Procedure Laterality Date    COLON RESECTION      EYE SURGERY      muscle surgery    FOOT SURGERY      neuroma/ganglion cyst    HERNIA REPAIR      KNEE ARTHROSCOPY      bilaterally       Family History   Problem Relation Age of Onset    Alcohol/Drug Father     Lung Disease Sister     Cancer Neg Hx     Diabetes Neg Hx     Stroke Neg Hx        Social History     Tobacco Use   Smoking Status Former    Current packs/day: 0.00    Types: Cigarettes    Quit date: 1999    Years since quittin.2   Smokeless Tobacco Never       Social History     Substance and Sexual Activity   Alcohol Use No    Alcohol/week: 0.0 oz       Review of systems.  As per HPI above. All other systems reviewed and negative.      Past Medical, Social, and Family history reviewed and updated in EPIC     Objective     /70   Pulse 60   Temp 36.2 °C (97.1 °F) (Temporal)   Resp 18   Ht 1.803 m (5' 11\")   Wt 82.6 kg (182 lb)   SpO2 99%    Body mass index is 25.38 kg/m².    General: alert in no apparent distress.  Cardiovascular: regular rate and rhythm  Pulmonary: lungs : no wheezing   Gastrointestinal: BS present. No obvious mass noted  Cranial nerves II to XII grossly intact.  Mental status and speech appropriate      Lab Results   Component Value Date/Time    HBA1C 5.2 2022 07:31 AM    HBA1C 5.6 2021 06:31 AM    HBA1C 6.1 (H) 2019 06:14 AM       Lab Results   Component Value Date/Time    WBC 7.7 2014 09:06 AM    HEMOGLOBIN 15.4 2014 09:06 AM    HEMATOCRIT 44.6 2014 " 09:06 AM    MCV 89.8 04/03/2014 09:06 AM    PLATELETCT 318 04/03/2014 09:06 AM         Lab Results   Component Value Date/Time    SODIUM 141 12/12/2022 07:31 AM    POTASSIUM 3.4 (L) 12/12/2022 07:31 AM    GLUCOSE 96 12/12/2022 07:31 AM    BUN 13 12/12/2022 07:31 AM    CREATININE 0.73 12/12/2022 07:31 AM       Lab Results   Component Value Date/Time    CHOLSTRLTOT 166 12/12/2022 07:31 AM    TRIGLYCERIDE 119 12/12/2022 07:31 AM    HDL 45 12/12/2022 07:31 AM    LDL 97 12/12/2022 07:31 AM       Lab Results   Component Value Date/Time    ALTSGPT 15 12/12/2022 07:31 AM             Assessment and Plan     1. Parkinson's disease, unspecified whether dyskinesia present, unspecified whether manifestations fluctuate  Chronic stable condition.  Continue with Sinemet  3 times daily.  Continue follow-up with neurology service.    2. Chronic obstructive pulmonary disease, unspecified COPD type (HCC)  Chronic stable condition.  Patient currently asymptomatic.  Continue to monitor.  Recommend the use of albuterol as needed    3. Essential hypertension  - lisinopril-hydrochlorothiazide (PRINZIDE) 10-12.5 MG per tablet; Take 1 Tablet by mouth every day.  Dispense: 90 Tablet; Refill: 3  - CBC WITHOUT DIFFERENTIAL; Future  - TSH; Future  Chronic stable condition.  Continue with Prinzide 10-12.5 daily.  Blood pressure has been well controlled.  Rx refill sent to the pharmacy    4. Dyslipidemia  - Lipid Profile; Future  Chronic condition.  Current status unclear.  Lab test ordered for follow-up    5. Environmental allergies  Chronic stable condition.  Continue Zyrtec 10 mg daily    6. Prediabetes  - Basic Metabolic Panel; Future  - HEMOGLOBIN A1C; Future  Chronic condition.  Current status unclear.  Lab test ordered for follow-up.  Recommend low sweet low-carb diet.  Continue to monitor    7. Vitamin D deficiency  - VITAMIN D,25 HYDROXY (DEFICIENCY); Future    8. Hypokalemia  This is a new condition.  Patient asymptomatic.  Noted  with chart review.  Lab test requested for follow-up.    Attestation: I spent:   42   min -  That includes time for chart review before the visit, the actual patient visit, and time spent on documentation in EMR after the visit.  Chart review/prep, review of other providers' records, imaging/lab review, face-to-face time for history/examination, pt's counseling/education, ordering, prescribing,  review of results/meds/ treatment plan with patient, and care coordination.    The patient is of extensive complexity.                 Healthcare Maintenance       Health Maintenance Due   Topic Date Due    Zoster (Shingles) Vaccines (2 of 3) 08/15/2011     Reminded the patient to go to local pharmacy to get shingles vaccine          Please note that this dictation was created using voice recognition software. I have made every reasonable attempt to correct obvious errors, but I expect that there are errors of grammar and possibly content that I did not discover before finalizing the note.    Judah Sepulveda MD  Internal Medicine  [place] [facility]

## 2023-10-16 NOTE — ASSESSMENT & PLAN NOTE
Chronic ongoing condition.  The patient takes Zyrtec as needed.  Symptoms are well controlled.  The patient denies fever chills shortness of breath or wheezing.

## 2023-10-21 ENCOUNTER — OFFICE VISIT (OUTPATIENT)
Dept: URGENT CARE | Facility: PHYSICIAN GROUP | Age: 78
End: 2023-10-21
Payer: COMMERCIAL

## 2023-10-21 ENCOUNTER — HOSPITAL ENCOUNTER (OUTPATIENT)
Dept: RADIOLOGY | Facility: MEDICAL CENTER | Age: 78
End: 2023-10-21
Payer: COMMERCIAL

## 2023-10-21 VITALS
OXYGEN SATURATION: 98 % | TEMPERATURE: 98 F | WEIGHT: 182 LBS | HEIGHT: 71 IN | RESPIRATION RATE: 14 BRPM | HEART RATE: 74 BPM | BODY MASS INDEX: 25.48 KG/M2 | SYSTOLIC BLOOD PRESSURE: 122 MMHG | DIASTOLIC BLOOD PRESSURE: 86 MMHG

## 2023-10-21 DIAGNOSIS — R05.3 CHRONIC COUGH: ICD-10-CM

## 2023-10-21 DIAGNOSIS — J44.9 CHRONIC OBSTRUCTIVE PULMONARY DISEASE, UNSPECIFIED COPD TYPE (HCC): ICD-10-CM

## 2023-10-21 PROCEDURE — 3074F SYST BP LT 130 MM HG: CPT

## 2023-10-21 PROCEDURE — 3079F DIAST BP 80-89 MM HG: CPT

## 2023-10-21 PROCEDURE — 99214 OFFICE O/P EST MOD 30 MIN: CPT

## 2023-10-21 PROCEDURE — 71046 X-RAY EXAM CHEST 2 VIEWS: CPT

## 2023-10-21 RX ORDER — FLUTICASONE PROPIONATE AND SALMETEROL 100; 50 UG/1; UG/1
1 POWDER RESPIRATORY (INHALATION) 2 TIMES DAILY
Qty: 1 EACH | Refills: 0 | Status: SHIPPED | OUTPATIENT
Start: 2023-10-21 | End: 2023-11-22 | Stop reason: SDUPTHER

## 2023-10-21 RX ORDER — ALBUTEROL SULFATE 90 UG/1
2 AEROSOL, METERED RESPIRATORY (INHALATION) EVERY 6 HOURS PRN
Qty: 8.5 G | Refills: 0 | Status: SHIPPED | OUTPATIENT
Start: 2023-10-21

## 2023-10-21 NOTE — PROGRESS NOTES
Subjective:   Florin Brooks is a 77 y.o. male who presents for Rib Injury (Pain on Lt side after gym workout x2d)      HPI:    Patient presents urgent care with his wife with concerns of left-sided rib pain  States symptoms started 2 days ago after riding his bike  Pain is triggered when he takes a deep breath in or coughs  States previous medical history of Parkinson's, COPD.  He states he has not used his inhalers in a couple years because he states the COPD has been treated and is no longer an issue  Denies fever, chills, night sweats  States cough is nonproductive.  He reports flulike illness 2 months ago, he states he fully recovered from the illness  Denies wheezing.  Endorses chest chest tightness, mild shortness of breath with exercise  Denies traumatic injuries to the his ribs, falls  Tolerating solids and fluids  Reports normal urinary output      ROS As above in HPI    Medications:    Current Outpatient Medications on File Prior to Visit   Medication Sig Dispense Refill    lisinopril-hydrochlorothiazide (PRINZIDE) 10-12.5 MG per tablet Take 1 Tablet by mouth every day. 90 Tablet 3    carbidopa-levodopa (SINEMET)  MG Tab Take 1 Tablet by mouth 3 times a day.      cetirizine (ZYRTEC) 10 MG Tab Take 10 mg by mouth every day.       No current facility-administered medications on file prior to visit.        Allergies:   Flagyl [metronidazole hcl]    Problem List:   Patient Active Problem List   Diagnosis    Osteoarthritis    COPD (chronic obstructive pulmonary disease) ([facility])    ED (erectile dysfunction)    Environmental allergies    Dermagraphy    Essential hypertension    Elevated PSA    Parkinson disease    REM sleep behavior disorder    Prediabetes    Vision loss of right eye    Skin mass    Dyslipidemia    Hypokalemia        Surgical History:  Past Surgical History:   Procedure Laterality Date    COLON RESECTION      EYE SURGERY      muscle surgery    FOOT SURGERY      neuroma/ganglion cyst  "   HERNIA REPAIR      KNEE ARTHROSCOPY      bilaterally       Past Social Hx:   Social History     Tobacco Use    Smoking status: Former     Current packs/day: 0.00     Types: Cigarettes     Quit date: 1999     Years since quittin.3    Smokeless tobacco: Never   Vaping Use    Vaping Use: Never used   Substance Use Topics    Alcohol use: No     Alcohol/week: 0.0 oz    Drug use: Not Currently     Types: Marijuana, Oral     Comment: CBD nightly           Problem list, medications, and allergies reviewed by myself today in Epic.     Objective:     /86   Pulse 74   Temp 36.7 °C (98 °F) (Temporal)   Resp 14   Ht 1.803 m (5' 11\")   Wt 82.6 kg (182 lb)   SpO2 98%   BMI 25.38 kg/m²     Physical Exam  Vitals and nursing note reviewed.   Constitutional:       General: He is not in acute distress.     Appearance: Normal appearance. He is not ill-appearing or diaphoretic.   HENT:      Head: Normocephalic.      Right Ear: Tympanic membrane and ear canal normal.      Left Ear: Tympanic membrane and ear canal normal.      Nose: Rhinorrhea present. No congestion. Rhinorrhea is clear.      Right Sinus: No maxillary sinus tenderness or frontal sinus tenderness.      Left Sinus: No maxillary sinus tenderness or frontal sinus tenderness.      Mouth/Throat:      Mouth: Mucous membranes are moist.      Pharynx: Oropharynx is clear. Posterior oropharyngeal erythema present.   Cardiovascular:      Rate and Rhythm: Normal rate and regular rhythm.      Heart sounds: Normal heart sounds. No murmur heard.     No friction rub. No gallop.   Pulmonary:      Effort: Pulmonary effort is normal. No respiratory distress.      Breath sounds: No stridor. Examination of the left-upper field reveals wheezing. Examination of the left-middle field reveals wheezing. Examination of the left-lower field reveals wheezing. Wheezing present. No rhonchi or rales.   Chest:      Chest wall: No tenderness.   Abdominal:      General: Bowel sounds " are normal.      Palpations: Abdomen is soft.   Musculoskeletal:      Cervical back: No rigidity or tenderness.   Lymphadenopathy:      Cervical: No cervical adenopathy.   Skin:     General: Skin is warm and dry.      Capillary Refill: Capillary refill takes less than 2 seconds.      Findings: No rash.   Neurological:      Mental Status: He is alert and oriented to person, place, and time.         Assessment/Plan:     DX-CHEST-2 VIEWS 10/21/2023    Narrative  10/21/2023 11:47 AM    HISTORY/REASON FOR EXAM:  Cough; left sided low lung pain; history of COPD.      TECHNIQUE/EXAM DESCRIPTION AND NUMBER OF VIEWS:  Two views of the chest.    COMPARISON:  04/03/2014    FINDINGS:  The heart is normal in size.  No pulmonary infiltrates or consolidations are noted.  No pleural effusions are appreciated.    Impression  No active disease.      Diagnosis and associated orders:   1. Chronic cough  - DX-CHEST-2 VIEWS; Future    2. Chronic obstructive pulmonary disease, unspecified COPD type (HCC)  - fluticasone-salmeterol (ADVAIR) 100-50 MCG/ACT AEROSOL POWDER, BREATH ACTIVATED; Inhale 1 Puff 2 times a day.  Dispense: 1 Each; Refill: 0  - albuterol 108 (90 Base) MCG/ACT Aero Soln inhalation aerosol; Inhale 2 Puffs every 6 hours as needed for Shortness of Breath.  Dispense: 8.5 g; Refill: 0        Comments/MDM:     Patient has pmh of parkinsons and untreated COPD. Chest xray obtained to rule out infectious processes. Wheezing is present.   Per radiology impression, no active disease is present. Images reviewed.   Patient tolerated duo neb treatment well. Reports alleviation of chest tightness, SOB. States he is able to take a full deep breath without significant sharp pain in the left lower rib area.   Will restart patient on Advair and Albuterol.  Strict return to ER precautions reviewed  Follow up with PCP advised for spirometry  Patient and his wife verbalized understanding and consented to plan of care.       Please note that  this dictation was created using voice recognition software. I have made a reasonable attempt to correct obvious errors, but I expect that there are errors of grammar and possibly content that I did not discover before finalizing the note.    This note was electronically signed by BENOIT Mesa

## 2023-11-16 DIAGNOSIS — J44.9 CHRONIC OBSTRUCTIVE PULMONARY DISEASE, UNSPECIFIED COPD TYPE (HCC): ICD-10-CM

## 2023-11-22 DIAGNOSIS — J44.9 CHRONIC OBSTRUCTIVE PULMONARY DISEASE, UNSPECIFIED COPD TYPE (HCC): ICD-10-CM

## 2023-11-22 RX ORDER — FLUTICASONE PROPIONATE AND SALMETEROL 100; 50 UG/1; UG/1
1 POWDER RESPIRATORY (INHALATION) 2 TIMES DAILY
Refills: 0 | OUTPATIENT
Start: 2023-11-22

## 2023-11-22 RX ORDER — FLUTICASONE PROPIONATE AND SALMETEROL 100; 50 UG/1; UG/1
1 POWDER RESPIRATORY (INHALATION) 2 TIMES DAILY
Qty: 1 EACH | Refills: 0 | Status: SHIPPED | OUTPATIENT
Start: 2023-11-22 | End: 2024-01-10

## 2024-01-09 DIAGNOSIS — J44.9 CHRONIC OBSTRUCTIVE PULMONARY DISEASE, UNSPECIFIED COPD TYPE (HCC): ICD-10-CM

## 2024-01-10 RX ORDER — FLUTICASONE PROPIONATE AND SALMETEROL 100; 50 UG/1; UG/1
1 POWDER RESPIRATORY (INHALATION) 2 TIMES DAILY
Qty: 3 EACH | Refills: 3 | Status: SHIPPED | OUTPATIENT
Start: 2024-01-10

## 2024-02-15 ENCOUNTER — APPOINTMENT (OUTPATIENT)
Dept: PHYSICAL THERAPY | Facility: REHABILITATION | Age: 79
End: 2024-02-15
Payer: COMMERCIAL

## 2024-02-22 ENCOUNTER — APPOINTMENT (OUTPATIENT)
Dept: PHYSICAL THERAPY | Facility: REHABILITATION | Age: 79
End: 2024-02-22
Payer: COMMERCIAL

## 2024-02-29 ENCOUNTER — OFFICE VISIT (OUTPATIENT)
Dept: URGENT CARE | Facility: PHYSICIAN GROUP | Age: 79
End: 2024-02-29
Payer: COMMERCIAL

## 2024-02-29 ENCOUNTER — APPOINTMENT (OUTPATIENT)
Dept: PHYSICAL THERAPY | Facility: REHABILITATION | Age: 79
End: 2024-02-29
Payer: COMMERCIAL

## 2024-02-29 VITALS
RESPIRATION RATE: 20 BRPM | HEIGHT: 71 IN | WEIGHT: 180 LBS | DIASTOLIC BLOOD PRESSURE: 88 MMHG | SYSTOLIC BLOOD PRESSURE: 132 MMHG | OXYGEN SATURATION: 94 % | HEART RATE: 79 BPM | BODY MASS INDEX: 25.2 KG/M2 | TEMPERATURE: 98.4 F

## 2024-02-29 DIAGNOSIS — J06.9 UPPER RESPIRATORY INFECTION, ACUTE: ICD-10-CM

## 2024-02-29 LAB
FLUAV RNA SPEC QL NAA+PROBE: NEGATIVE
FLUBV RNA SPEC QL NAA+PROBE: NEGATIVE
RSV RNA SPEC QL NAA+PROBE: NEGATIVE
SARS-COV-2 RNA RESP QL NAA+PROBE: POSITIVE

## 2024-02-29 PROCEDURE — 99213 OFFICE O/P EST LOW 20 MIN: CPT

## 2024-02-29 PROCEDURE — 0241U POCT CEPHEID COV-2, FLU A/B, RSV - PCR: CPT

## 2024-02-29 PROCEDURE — 3079F DIAST BP 80-89 MM HG: CPT

## 2024-02-29 PROCEDURE — 3075F SYST BP GE 130 - 139MM HG: CPT

## 2024-02-29 ASSESSMENT — ENCOUNTER SYMPTOMS
FEVER: 0
SHORTNESS OF BREATH: 0
HEADACHES: 0
COUGH: 1
VOMITING: 0
DIZZINESS: 0
ABDOMINAL PAIN: 0
NAUSEA: 0
MYALGIAS: 0
SORE THROAT: 0
SINUS PAIN: 0
CHILLS: 0

## 2024-03-01 NOTE — PROGRESS NOTES
Chief Complaint   Patient presents with    Cough     Today, Runny nose, pts wife tested positive for COVID       HISTORY OF PRESENT ILLNESS: Patient is a pleasant 78 y.o. male who presents to urgent care today cold-like symptoms for the last 2 days with runny nose and cough.  Wife tested positive for COVID, patient is concerned he may have this as well.    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 30 Each 0    fluticasone-salmeterol (ADVAIR) 100-50 MCG/ACT AEROSOL POWDER, BREATH ACTIVATED Inhale 1 puff by mouth 2 times a day. 3 Each 3    albuterol 108 (90 Base) MCG/ACT Aero Soln inhalation aerosol Inhale 2 Puffs every 6 hours as needed for Shortness of Breath. 8.5 g 0    lisinopril-hydrochlorothiazide (PRINZIDE) 10-12.5 MG per tablet Take 1 Tablet by mouth every day. 90 Tablet 3    carbidopa-levodopa (SINEMET)  MG Tab Take 1 Tablet by mouth 3 times a day.      cetirizine (ZYRTEC) 10 MG Tab Take 10 mg by mouth every day.       No current [facility]-ordered facility-administered medications on file.       Past Medical History:   Diagnosis Date    COPD (chronic obstructive pulmonary  "disease) (HCC) 2013    ED (erectile dysfunction) 2013    Osteoarthritis 2013    Shingles     Shortness of breath 2013       Social History     Tobacco Use    Smoking status: Former     Current packs/day: 0.00     Types: Cigarettes     Quit date: 1999     Years since quittin.6    Smokeless tobacco: Never   Vaping Use    Vaping Use: Never used   Substance Use Topics    Alcohol use: No     Alcohol/week: 0.0 oz    Drug use: Not Currently     Types: Marijuana, Oral     Comment: CBD nightly        Family Status   Relation Name Status    Mo  Alive    Fa      Sis      Neg Hx  (Not Specified)     Family History   Problem Relation Age of Onset    Alcohol/Drug Father     Lung Disease Sister     Cancer Neg Hx     Diabetes Neg Hx     Stroke Neg Hx        Review of Systems   Constitutional:  Positive for malaise/fatigue. Negative for chills and fever.   HENT:  Positive for congestion. Negative for sinus pain and sore throat.    Respiratory:  Positive for cough. Negative for shortness of breath.    Gastrointestinal:  Negative for abdominal pain, nausea and vomiting.   Musculoskeletal:  Negative for myalgias.   Neurological:  Negative for dizziness and headaches.       Exam:  /88   Pulse 79   Temp 36.9 °C (98.4 °F) (Temporal)   Resp 20   Ht 1.803 m (5' 11\")   Wt 81.6 kg (180 lb)   SpO2 94%   Physical Exam  Constitutional:       Appearance: Normal appearance.   HENT:      Head: Normocephalic.      Right Ear: Tympanic membrane and ear canal normal. There is no impacted cerumen. Tympanic membrane is not injected or erythematous.      Left Ear: Tympanic membrane and ear canal normal. There is no impacted cerumen. Tympanic membrane is not injected or erythematous.      Nose: Congestion and rhinorrhea present. Rhinorrhea is clear.      Mouth/Throat:      Mouth: Mucous membranes are moist.      Pharynx: Oropharynx is clear. No oropharyngeal exudate or posterior oropharyngeal erythema. "      Tonsils: No tonsillar exudate. 0 on the right. 0 on the left.   Eyes:      General:         Right eye: No discharge.         Left eye: No discharge.      Extraocular Movements: Extraocular movements intact.      Conjunctiva/sclera: Conjunctivae normal.      Pupils: Pupils are equal, round, and reactive to light.   Cardiovascular:      Rate and Rhythm: Normal rate and regular rhythm.      Pulses: Normal pulses.      Heart sounds: Normal heart sounds. No murmur heard.  Pulmonary:      Effort: Pulmonary effort is normal. No respiratory distress.      Breath sounds: Normal breath sounds. No stridor. No wheezing or rhonchi.      Comments: Positive dry cough  Chest:      Chest wall: No tenderness.   Musculoskeletal:         General: No swelling, tenderness, deformity or signs of injury. Normal range of motion.      Cervical back: Normal range of motion.      Right lower leg: No edema.      Left lower leg: No edema.   Lymphadenopathy:      Cervical: No cervical adenopathy.   Skin:     General: Skin is warm and dry.      Capillary Refill: Capillary refill takes less than 2 seconds.      Findings: No bruising or rash.   Neurological:      General: No focal deficit present.      Mental Status: He is alert.      Sensory: No sensory deficit.      Motor: No weakness.      Coordination: Coordination normal.      Gait: Gait normal.   Psychiatric:         Mood and Affect: Mood normal.         Behavior: Behavior normal.         Thought Content: Thought content normal.         Judgment: Judgment normal.         Assessment/Plan:  1. Upper respiratory infection, acute  - POCT CoV-2, Flu A/B, RSV by PCR  - Nirmatrelvir&Ritonavir 300/100 20 x 150 MG & 10 x 100MG Tablet Therapy Pack; Take 300 mg nirmatrelvir (two 150 mg tablets) with 100 mg ritonavir (one 100 mg tablet) by mouth, with all three tablets taken together twice daily for 5 days.  Dispense: 30 Each; Refill: 0    Based on patient's physical presentation along with review of  systems I do think they likely have a viral illness.  Swabbed for flu and COVID vitals are within normal limits, lung sounds are clear to auscultation.  I did go ahead and place patient on Paxlovid. Advised patient to drink plenty of fluids, take Motrin and Tylenol as needed, vitamin C, D.  Patient is aware of the plan of care and agreeable at this time, encouraged them to follow-up if they continue to get worse or do not improve.  Advised to hold Advair during Paxlovid to use patient is not on a statin, last creatinine was approximately 1 year ago was within normal limits.    Supportive care, differential diagnoses, and indications for immediate follow-up discussed with patient.   Pathogenesis of diagnosis discussed including typical length and natural progression.   Instructed to return to clinic or nearest emergency department for any change in condition, further concerns, or worsening of symptoms.  Patient states understanding of the plan of care and discharge instructions.  Instructed to make an appointment, for follow up, with primary care provider.    Please note that this dictation was created using voice recognition software. I have made every reasonable attempt to correct obvious errors, but I expect that there are errors of grammar and possibly content that I did not discover before finalizing the note.      Ellyn LABOY

## 2024-03-07 ENCOUNTER — APPOINTMENT (OUTPATIENT)
Dept: PHYSICAL THERAPY | Facility: REHABILITATION | Age: 79
End: 2024-03-07
Payer: COMMERCIAL

## 2024-03-22 ENCOUNTER — HOSPITAL ENCOUNTER (OUTPATIENT)
Dept: LAB | Facility: MEDICAL CENTER | Age: 79
End: 2024-03-22
Attending: INTERNAL MEDICINE
Payer: COMMERCIAL

## 2024-03-22 DIAGNOSIS — I10 ESSENTIAL HYPERTENSION: ICD-10-CM

## 2024-03-22 DIAGNOSIS — E55.9 VITAMIN D DEFICIENCY: ICD-10-CM

## 2024-03-22 DIAGNOSIS — R73.03 PREDIABETES: Chronic | ICD-10-CM

## 2024-03-22 DIAGNOSIS — E78.5 DYSLIPIDEMIA: ICD-10-CM

## 2024-03-22 LAB
25(OH)D3 SERPL-MCNC: 16 NG/ML (ref 30–100)
ANION GAP SERPL CALC-SCNC: 12 MMOL/L (ref 7–16)
BUN SERPL-MCNC: 16 MG/DL (ref 8–22)
CALCIUM SERPL-MCNC: 8.7 MG/DL (ref 8.5–10.5)
CHLORIDE SERPL-SCNC: 106 MMOL/L (ref 96–112)
CHOLEST SERPL-MCNC: 160 MG/DL (ref 100–199)
CO2 SERPL-SCNC: 25 MMOL/L (ref 20–33)
CREAT SERPL-MCNC: 0.84 MG/DL (ref 0.5–1.4)
ERYTHROCYTE [DISTWIDTH] IN BLOOD BY AUTOMATED COUNT: 46.8 FL (ref 35.9–50)
EST. AVERAGE GLUCOSE BLD GHB EST-MCNC: 111 MG/DL
FASTING STATUS PATIENT QL REPORTED: NORMAL
GFR SERPLBLD CREATININE-BSD FMLA CKD-EPI: 89 ML/MIN/1.73 M 2
GLUCOSE SERPL-MCNC: 109 MG/DL (ref 65–99)
HBA1C MFR BLD: 5.5 % (ref 4–5.6)
HCT VFR BLD AUTO: 42.5 % (ref 42–52)
HDLC SERPL-MCNC: 48 MG/DL
HGB BLD-MCNC: 14.7 G/DL (ref 14–18)
LDLC SERPL CALC-MCNC: 100 MG/DL
MCH RBC QN AUTO: 32 PG (ref 27–33)
MCHC RBC AUTO-ENTMCNC: 34.6 G/DL (ref 32.3–36.5)
MCV RBC AUTO: 92.6 FL (ref 81.4–97.8)
PLATELET # BLD AUTO: 303 K/UL (ref 164–446)
PMV BLD AUTO: 9.7 FL (ref 9–12.9)
POTASSIUM SERPL-SCNC: 3.3 MMOL/L (ref 3.6–5.5)
RBC # BLD AUTO: 4.59 M/UL (ref 4.7–6.1)
SODIUM SERPL-SCNC: 143 MMOL/L (ref 135–145)
TRIGL SERPL-MCNC: 58 MG/DL (ref 0–149)
TSH SERPL DL<=0.005 MIU/L-ACNC: 1.14 UIU/ML (ref 0.38–5.33)
WBC # BLD AUTO: 7.1 K/UL (ref 4.8–10.8)

## 2024-03-22 PROCEDURE — 85027 COMPLETE CBC AUTOMATED: CPT

## 2024-03-22 PROCEDURE — 84443 ASSAY THYROID STIM HORMONE: CPT

## 2024-03-22 PROCEDURE — 83036 HEMOGLOBIN GLYCOSYLATED A1C: CPT

## 2024-03-22 PROCEDURE — 36415 COLL VENOUS BLD VENIPUNCTURE: CPT

## 2024-03-22 PROCEDURE — 82306 VITAMIN D 25 HYDROXY: CPT

## 2024-03-22 PROCEDURE — 80061 LIPID PANEL: CPT

## 2024-03-22 PROCEDURE — 80048 BASIC METABOLIC PNL TOTAL CA: CPT

## 2024-03-23 DIAGNOSIS — E55.9 VITAMIN D DEFICIENCY: ICD-10-CM

## 2024-03-23 DIAGNOSIS — E87.6 HYPOKALEMIA: ICD-10-CM

## 2024-03-23 RX ORDER — POTASSIUM CHLORIDE 20 MEQ/1
20 TABLET, EXTENDED RELEASE ORAL DAILY
Qty: 7 TABLET | Refills: 0 | Status: SHIPPED | OUTPATIENT
Start: 2024-03-23

## 2024-04-01 ENCOUNTER — HOSPITAL ENCOUNTER (OUTPATIENT)
Dept: LAB | Facility: MEDICAL CENTER | Age: 79
End: 2024-04-01
Attending: INTERNAL MEDICINE
Payer: COMMERCIAL

## 2024-04-01 DIAGNOSIS — E87.6 HYPOKALEMIA: ICD-10-CM

## 2024-04-01 LAB — POTASSIUM SERPL-SCNC: 3.8 MMOL/L (ref 3.6–5.5)

## 2024-04-01 PROCEDURE — 36415 COLL VENOUS BLD VENIPUNCTURE: CPT

## 2024-04-01 PROCEDURE — 84132 ASSAY OF SERUM POTASSIUM: CPT

## 2024-04-08 ENCOUNTER — APPOINTMENT (OUTPATIENT)
Dept: PHYSICAL THERAPY | Facility: REHABILITATION | Age: 79
End: 2024-04-08
Payer: COMMERCIAL

## 2024-04-11 ENCOUNTER — APPOINTMENT (OUTPATIENT)
Dept: PHYSICAL THERAPY | Facility: REHABILITATION | Age: 79
End: 2024-04-11
Payer: COMMERCIAL

## 2024-04-15 ENCOUNTER — OFFICE VISIT (OUTPATIENT)
Dept: MEDICAL GROUP | Facility: PHYSICIAN GROUP | Age: 79
End: 2024-04-15
Payer: COMMERCIAL

## 2024-04-15 ENCOUNTER — APPOINTMENT (OUTPATIENT)
Dept: PHYSICAL THERAPY | Facility: REHABILITATION | Age: 79
End: 2024-04-15
Payer: COMMERCIAL

## 2024-04-15 VITALS
HEART RATE: 72 BPM | HEIGHT: 71 IN | TEMPERATURE: 98.4 F | DIASTOLIC BLOOD PRESSURE: 84 MMHG | WEIGHT: 179.2 LBS | SYSTOLIC BLOOD PRESSURE: 126 MMHG | OXYGEN SATURATION: 98 % | BODY MASS INDEX: 25.09 KG/M2

## 2024-04-15 DIAGNOSIS — G20.A1 PARKINSON'S DISEASE, UNSPECIFIED WHETHER DYSKINESIA PRESENT, UNSPECIFIED WHETHER MANIFESTATIONS FLUCTUATE (HCC): Chronic | ICD-10-CM

## 2024-04-15 DIAGNOSIS — I10 ESSENTIAL HYPERTENSION: Chronic | ICD-10-CM

## 2024-04-15 DIAGNOSIS — E78.5 DYSLIPIDEMIA: Chronic | ICD-10-CM

## 2024-04-15 DIAGNOSIS — Z12.5 SCREENING FOR MALIGNANT NEOPLASM OF PROSTATE: ICD-10-CM

## 2024-04-15 DIAGNOSIS — J44.9 CHRONIC OBSTRUCTIVE PULMONARY DISEASE, UNSPECIFIED COPD TYPE (HCC): ICD-10-CM

## 2024-04-15 DIAGNOSIS — E55.9 VITAMIN D DEFICIENCY: Chronic | ICD-10-CM

## 2024-04-15 DIAGNOSIS — Z91.09 ENVIRONMENTAL ALLERGIES: Chronic | ICD-10-CM

## 2024-04-15 DIAGNOSIS — R97.20 ELEVATED PSA: ICD-10-CM

## 2024-04-15 DIAGNOSIS — R73.03 PREDIABETES: Chronic | ICD-10-CM

## 2024-04-15 PROCEDURE — 3074F SYST BP LT 130 MM HG: CPT | Performed by: INTERNAL MEDICINE

## 2024-04-15 PROCEDURE — 99214 OFFICE O/P EST MOD 30 MIN: CPT | Performed by: INTERNAL MEDICINE

## 2024-04-15 PROCEDURE — 3079F DIAST BP 80-89 MM HG: CPT | Performed by: INTERNAL MEDICINE

## 2024-04-15 ASSESSMENT — PATIENT HEALTH QUESTIONNAIRE - PHQ9: CLINICAL INTERPRETATION OF PHQ2 SCORE: 0

## 2024-04-15 ASSESSMENT — FIBROSIS 4 INDEX: FIB4 SCORE: 1.26

## 2024-04-15 NOTE — ASSESSMENT & PLAN NOTE
Chronic ongoing condition.  The patient is taking Zyrtec 10 mg daily.  Patient denies fever chills shortness of breath or wheezing.

## 2024-04-15 NOTE — ASSESSMENT & PLAN NOTE
This is a chronic condition.  Noted with chart review.  Discussed with the patient today regarding urology referral.  The patient stated that even if he has prostate cancer he does not wish to undergo any treatment    Patient declined to have follow-up PSA testing and he also declined urology referral

## 2024-04-15 NOTE — ASSESSMENT & PLAN NOTE
This is a chronic ongoing condition.  The patient is taking lisinopril 10 hydrochlorothiazide 12.5 Mg daily.  Patient is tolerating medication well.

## 2024-04-15 NOTE — ASSESSMENT & PLAN NOTE
This is a new condition.  Noted with recent lab test.  Advised the patient to take over-the-counter vitamin D supplementation.

## 2024-04-15 NOTE — ASSESSMENT & PLAN NOTE
Chronic condition.  Patient is being treated with Advair twice daily and uses albuterol as needed.  Patient tolerating medication well.

## 2024-04-15 NOTE — PROGRESS NOTES
PRIMARY CARE CLINIC VISIT        Chief Complaint   Patient presents with    Follow-Up     Lab results.    Parkinson's disease  COPD  Allergy  Hypertension  Prediabetes  Hyperlipidemia  Vitamin D deficiency  Elevated PSA          History of Present Illness     Parkinson disease  This is a chronic condition.  The patient has been followed by neurology service.  Patient is being treated with Sinemet 3 times daily.  Patient tolerating medication well.    COPD (chronic obstructive pulmonary disease) (HCC)  Chronic condition.  Patient is being treated with Advair twice daily and uses albuterol as needed.  Patient tolerating medication well.    Environmental allergies  Chronic ongoing condition.  The patient is taking Zyrtec 10 mg daily.  Patient denies fever chills shortness of breath or wheezing.    Essential hypertension  This is a chronic ongoing condition.  The patient is taking lisinopril 10 hydrochlorothiazide 12.5 Mg daily.  Patient is tolerating medication well.    Prediabetes  Chronic condition but the patient currently on diet therapy.    Dyslipidemia  This is a chronic condition.  The patient currently on diet therapy.    Vitamin D deficiency  This is a new condition.  Noted with recent lab test.  Advised the patient to take over-the-counter vitamin D supplementation.    Elevated PSA  This is a chronic condition.  Noted with chart review.  Discussed with the patient today regarding urology referral.  The patient stated that even if he has prostate cancer he does not wish to undergo any treatment    Patient declined to have follow-up PSA testing and he also declined urology referral    Current Outpatient Medications on File Prior to Visit   Medication Sig Dispense Refill    potassium chloride SA (KDUR) 20 MEQ Tab CR Take 1 Tablet by mouth every day. 7 Tablet 0    fluticasone-salmeterol (ADVAIR) 100-50 MCG/ACT AEROSOL POWDER, BREATH ACTIVATED Inhale 1 puff by mouth 2 times a day. 3 Each 3    albuterol 108 (90  Base) MCG/ACT Aero Soln inhalation aerosol Inhale 2 Puffs every 6 hours as needed for Shortness of Breath. 8.5 g 0    lisinopril-hydrochlorothiazide (PRINZIDE) 10-12.5 MG per tablet Take 1 Tablet by mouth every day. 90 Tablet 3    carbidopa-levodopa (SINEMET)  MG Tab Take 1 Tablet by mouth 3 times a day.      cetirizine (ZYRTEC) 10 MG Tab Take 10 mg by mouth every day.       No current facility-administered medications on file prior to visit.        Allergies: Flagyl [metronidazole hcl]    Current Outpatient Medications Ordered in Epic   Medication Sig Dispense Refill    potassium chloride SA (KDUR) 20 MEQ Tab CR Take 1 Tablet by mouth every day. 7 Tablet 0    fluticasone-salmeterol (ADVAIR) 100-50 MCG/ACT AEROSOL POWDER, BREATH ACTIVATED Inhale 1 puff by mouth 2 times a day. 3 Each 3    albuterol 108 (90 Base) MCG/ACT Aero Soln inhalation aerosol Inhale 2 Puffs every 6 hours as needed for Shortness of Breath. 8.5 g 0    lisinopril-hydrochlorothiazide (PRINZIDE) 10-12.5 MG per tablet Take 1 Tablet by mouth every day. 90 Tablet 3    carbidopa-levodopa (SINEMET)  MG Tab Take 1 Tablet by mouth 3 times a day.      cetirizine (ZYRTEC) 10 MG Tab Take 10 mg by mouth every day.       No current [facility]-ordered facility-administered medications on file.       Past Medical History:   Diagnosis Date    COPD (chronic obstructive pulmonary disease) (HCC) 7/5/2013    ED (erectile dysfunction) 7/5/2013    Osteoarthritis 7/5/2013    Shingles     Shortness of breath 7/5/2013       Past Surgical History:   Procedure Laterality Date    COLON RESECTION      EYE SURGERY      muscle surgery    FOOT SURGERY      neuroma/ganglion cyst    HERNIA REPAIR      KNEE ARTHROSCOPY      bilaterally       Family History   Problem Relation Age of Onset    Alcohol/Drug Father     Lung Disease Sister     Cancer Neg Hx     Diabetes Neg Hx     Stroke Neg Hx        Social History     Tobacco Use   Smoking Status Former    Current packs/day: 0.00  "   Types: Cigarettes    Quit date: 1999    Years since quittin.7   Smokeless Tobacco Never       Social History     Substance and Sexual Activity   Alcohol Use No    Alcohol/week: 0.0 oz       Review of systems  As per HPI above. All other systems reviewed and negative.      Past Medical, Social, and Family history reviewed and updated in [facility]       LAB DATA:    Lab Results   Component Value Date/Time    HBA1C 5.5 2024 06:45 AM    HBA1C 5.2 2022 07:31 AM    HBA1C 5.6 2021 06:31 AM       Lab Results   Component Value Date/Time    WBC 7.1 2024 06:45 AM    HEMOGLOBIN 14.7 2024 06:45 AM    HEMATOCRIT 42.5 2024 06:45 AM    MCV 92.6 2024 06:45 AM    PLATELETCT 303 2024 06:45 AM       Lab Results   Component Value Date/Time    SODIUM 143 2024 06:45 AM    POTASSIUM 3.8 2024 06:39 AM    GLUCOSE 109 (H) 2024 06:45 AM    BUN 16 2024 06:45 AM    CREATININE 0.84 2024 06:45 AM       Lab Results   Component Value Date/Time    CHOLSTRLTOT 160 2024 06:45 AM    TRIGLYCERIDE 58 2024 06:45 AM    HDL 48 2024 06:45 AM     (H) 2024 06:45 AM       Lab Results   Component Value Date/Time    ALTSGPT 15 2022 07:31 AM          Objective     /84 (BP Location: Right arm, Patient Position: Sitting, BP Cuff Size: Adult)   Pulse 72   Temp 36.9 °C (98.4 °F) (Temporal)   Ht 1.803 m (5' 11\")   Wt 81.3 kg (179 lb 3.2 oz)   SpO2 98%    Body mass index is 24.99 kg/m².    General: alert in no apparent distress.  Cardiovascular: regular rate and rhythm  Pulmonary: lungs : no wheezing   Gastrointestinal: BS present.       Assessment and Plan     1. Parkinson's disease, unspecified whether dyskinesia present, unspecified whether manifestations fluctuate (HCC)   chronic stable condition.  Continue with Sinemet  3 times daily.  Continue follow-up with neurology service.    2. Chronic obstructive pulmonary disease, " unspecified COPD type (HCC)  Chronic stable condition.  Continue Advair 1 puff twice daily.  Rinse mouth after use.  Patient may use albuterol as needed for rescue treatment.    3. Environmental allergies  Chronic stable.  Continue Zyrtec 10 mg daily    4. Essential hypertension  Chronic stable.  Continue lisinopril 10 hydrochlorothiazide 12.5 Mg daily    5. Prediabetes  - HEMOGLOBIN A1C; Future  - Basic Metabolic Panel; Future  Chronic condition.  Patient interested in taking medication.  Recommend diet and exercise.  Continue to monitor    6. Dyslipidemia  - ALANINE AMINO-TRANS; Future  - Lipid Profile; Future  Chronic condition.  Recent lipid panel result discussed with the patient.  Recommend diet and exercise.  Repeat lab test next visit    7. Vitamin D deficiency  - VITAMIN D,25 HYDROXY (DEFICIENCY); Future  Chronic condition.  Recommend patient to take vitamin D 4000 unit daily.  Repeat lab test next visit      8. Elevated PSA  Chronic condition.  As above the patient declined to repeat PSA testing and declines urology referral.  Patient currently asymptomatic      Follow-up 6 months        Attestation: I spent:   34  minutes -    That includes time for chart review before the visit, the actual patient visit, and time spent on documentation in EMR after the visit.  Chart review/prep, review of other providers' records, imaging/lab review, face-to-face time for history/examination, pt's counseling/education, ordering, prescribing,  review of results/meds/ treatment plan with patient, and care coordination.           Healthcare Maintenance       Health Maintenance Due   Topic Date Due    Annual Pulmonary Function Test / Spirometry  Never done    Zoster (Shingles) Vaccines (2 of 3) 08/15/2011    IMM DTaP/Tdap/Td Vaccine (2 - Td or Tdap) 08/19/2021    Annual Wellness Visit  11/30/2022               Please note that this dictation was created using voice recognition software. I have made every reasonable attempt  to correct obvious errors, but I expect that there are errors of grammar and possibly content that I did not discover before finalizing the note.    Judah Sepulveda MD  Internal Medicine  [facility]

## 2024-04-15 NOTE — ASSESSMENT & PLAN NOTE
This is a chronic condition.  The patient has been followed by neurology service.  Patient is being treated with Sinemet 3 times daily.  Patient tolerating medication well.

## 2024-04-18 ENCOUNTER — APPOINTMENT (OUTPATIENT)
Dept: PHYSICAL THERAPY | Facility: REHABILITATION | Age: 79
End: 2024-04-18
Payer: COMMERCIAL

## 2024-05-20 ENCOUNTER — OFFICE VISIT (OUTPATIENT)
Dept: URGENT CARE | Facility: PHYSICIAN GROUP | Age: 79
End: 2024-05-20
Payer: COMMERCIAL

## 2024-05-20 VITALS
RESPIRATION RATE: 18 BRPM | TEMPERATURE: 98.2 F | HEIGHT: 71 IN | OXYGEN SATURATION: 98 % | SYSTOLIC BLOOD PRESSURE: 136 MMHG | BODY MASS INDEX: 24.78 KG/M2 | WEIGHT: 177 LBS | DIASTOLIC BLOOD PRESSURE: 90 MMHG | HEART RATE: 63 BPM

## 2024-05-20 DIAGNOSIS — Z20.822 CONTACT WITH AND (SUSPECTED) EXPOSURE TO COVID-19: ICD-10-CM

## 2024-05-20 DIAGNOSIS — J06.9 VIRAL URI WITH COUGH: Primary | ICD-10-CM

## 2024-05-20 DIAGNOSIS — R03.0 ELEVATED BLOOD PRESSURE READING: ICD-10-CM

## 2024-05-20 PROCEDURE — 3080F DIAST BP >= 90 MM HG: CPT

## 2024-05-20 PROCEDURE — 99212 OFFICE O/P EST SF 10 MIN: CPT

## 2024-05-20 PROCEDURE — 0241U POCT CEPHEID COV-2, FLU A/B, RSV - PCR: CPT

## 2024-05-20 PROCEDURE — 3075F SYST BP GE 130 - 139MM HG: CPT

## 2024-05-20 RX ORDER — ERGOCALCIFEROL 1.25 MG/1
CAPSULE ORAL
COMMUNITY

## 2024-05-20 RX ORDER — BENZONATATE 100 MG/1
200 CAPSULE ORAL 3 TIMES DAILY PRN
COMMUNITY

## 2024-05-20 RX ORDER — GUAIFENESIN 600 MG/1
600 TABLET, EXTENDED RELEASE ORAL EVERY 12 HOURS
COMMUNITY

## 2024-05-20 ASSESSMENT — ENCOUNTER SYMPTOMS
SHORTNESS OF BREATH: 0
CHILLS: 1
NAUSEA: 0
DEPRESSION: 0
COUGH: 1
WHEEZING: 0
EYE PAIN: 0
DOUBLE VISION: 0
FOCAL WEAKNESS: 0
HEMOPTYSIS: 0
EYE DISCHARGE: 0
PALPITATIONS: 0
SPUTUM PRODUCTION: 1
EYE REDNESS: 0
BLURRED VISION: 0
HEARTBURN: 0
HEADACHES: 1
VOMITING: 0
FEVER: 1
DIAPHORESIS: 0
STRIDOR: 0
DIZZINESS: 0
MYALGIAS: 1
DIARRHEA: 0
PHOTOPHOBIA: 0
SORE THROAT: 1
ABDOMINAL PAIN: 0
BRUISES/BLEEDS EASILY: 0

## 2024-05-20 ASSESSMENT — FIBROSIS 4 INDEX: FIB4 SCORE: 1.26

## 2024-05-20 NOTE — PROGRESS NOTES
Subjective:   Florin Brooks is a 78 y.o. male who presents for Coronavirus Screening (X4days. Runny nose, cough)          I introduced myself to the patient and informed them that I am a family nurse practitioner.    HPI:Florin is a 78-year-old male who comes in today c/o  chills, cough, nasal congestion, runny nose, malaise, body aches.  Onset was 4 days ago after patient flew in to town from out of state. Patient describes symptoms as constant. They describe the pain as  body aches. Aggravating factors include worse at night, cough is worse when they lay down. Relieving factors include none. Treatments tried at home include  OTC Muccinex, previous leftover Rx for Tessalon with good relief with poor effect.  They describe their symptoms as moderate. Denies any known exposure to Covid, Flu, RSV, strep. Spouse is also sick presently with same symptoms. States they did get a flu shot this season, states vaccinated against Covid x 6.      Review of Systems   Constitutional:  Positive for chills, fever and malaise/fatigue. Negative for diaphoresis.   HENT:  Positive for congestion and sore throat. Negative for ear discharge, ear pain, hearing loss and tinnitus.    Eyes:  Negative for blurred vision, double vision, photophobia, pain, discharge and redness.   Respiratory:  Positive for cough and sputum production. Negative for hemoptysis, shortness of breath, wheezing and stridor.    Cardiovascular:  Negative for chest pain, palpitations and leg swelling.   Gastrointestinal:  Negative for abdominal pain, diarrhea, heartburn, nausea and vomiting.   Genitourinary:  Negative for dysuria.   Musculoskeletal:  Positive for myalgias.   Skin:  Negative for rash.   Neurological:  Positive for headaches. Negative for dizziness and focal weakness.   Endo/Heme/Allergies:  Does not bruise/bleed easily.   Psychiatric/Behavioral:  Negative for depression.    All other systems reviewed and are negative.      Medications: albuterol  "Aers  benzonatate Caps  carbidopa-levodopa Tabs  cetirizine Tabs  fluticasone-salmeterol Aepb  guaiFENesin ER Tb12  lisinopril-hydrochlorothiazide  potassium chloride SA Tbcr  vitamin D2 (Ergocalciferol) Caps     Allergies: Flagyl [metronidazole hcl]    Problem List: does not have any pertinent problems on file.    Surgical History:  Past Surgical History:   Procedure Laterality Date    COLON RESECTION      EYE SURGERY      muscle surgery    FOOT SURGERY      neuroma/ganglion cyst    HERNIA REPAIR      KNEE ARTHROSCOPY      bilaterally       Past Social Hx:   reports that he quit smoking about 24 years ago. His smoking use included cigarettes. He has never used smokeless tobacco. He reports that he does not currently use drugs after having used the following drugs: Marijuana and Oral. He reports that he does not drink alcohol.     Past Family Hx:   family history includes Alcohol/Drug in his father; Lung Disease in his sister.     Problem list, medications, and allergies reviewed by myself today in Epic.   I have documented what I find to be significant in regards to past medical, social, family and surgical history  in my HPI or under PMH/PSH/FH review section, otherwise it is noncontributory     Objective:     BP (!) 136/90   Pulse 63   Temp 36.8 °C (98.2 °F) (Temporal)   Resp 18   Ht 1.803 m (5' 11\")   Wt 80.3 kg (177 lb)   SpO2 98%   BMI 24.69 kg/m²     During this visit, appropriate PPE was worn, and hand hygiene was performed.    Physical Exam  Vitals reviewed.   Constitutional:       General: He is not in acute distress.     Appearance: Normal appearance. He is not toxic-appearing or diaphoretic.   HENT:      Head: Normocephalic and atraumatic.      Right Ear: Tympanic membrane, ear canal and external ear normal. There is no impacted cerumen.      Left Ear: Tympanic membrane, ear canal and external ear normal. There is no impacted cerumen.      Nose: Congestion and rhinorrhea present.      " Mouth/Throat:      Mouth: Mucous membranes are moist.      Pharynx: Posterior oropharyngeal erythema present. No oropharyngeal exudate.   Eyes:      General: No scleral icterus.        Right eye: No discharge.         Left eye: No discharge.      Extraocular Movements: Extraocular movements intact.      Conjunctiva/sclera: Conjunctivae normal.      Pupils: Pupils are equal, round, and reactive to light.   Cardiovascular:      Rate and Rhythm: Normal rate and regular rhythm.      Heart sounds: Normal heart sounds. No murmur heard.     No friction rub. No gallop.   Pulmonary:      Effort: No respiratory distress.      Breath sounds: Normal breath sounds. No stridor. No wheezing, rhonchi or rales.   Chest:      Chest wall: No tenderness.   Abdominal:      General: Bowel sounds are normal. There is no distension.      Palpations: Abdomen is soft.   Genitourinary:     Comments: Deferred  Musculoskeletal:         General: Normal range of motion.      Cervical back: Normal range of motion. No rigidity or tenderness.   Lymphadenopathy:      Cervical: No cervical adenopathy.   Skin:     General: Skin is warm and dry.      Capillary Refill: Capillary refill takes 2 to 3 seconds.      Coloration: Skin is not jaundiced or pale.   Neurological:      General: No focal deficit present.      Mental Status: He is alert and oriented to person, place, and time. Mental status is at baseline.   Psychiatric:         Mood and Affect: Mood normal.         Behavior: Behavior normal.         Thought Content: Thought content normal.         Judgment: Judgment normal.       Lab Results/POC Test Results   Results for orders placed or performed in visit on 05/20/24   POCT CoV-2, Flu A/B, RSV by PCR   Result Value Ref Range    SARS-CoV-2 by PCR Negative Negative, Invalid    Influenza virus A RNA Negative Negative, Invalid    Influenza virus B, PCR Negative Negative, Invalid    RSV, PCR Negative Negative, Invalid             Assessment/Plan:      Diagnosis and associated orders:     1. Viral URI with cough  POCT CoV-2, Flu A/B, RSV by PCR      2. Contact with and (suspected) exposure to covid-19        3. Elevated blood pressure reading           Comments/MDM:     1. Viral URI with cough    I discussed with patient I will call them only if PCR testing in clinic today is positive and we need to discuss further treatment otherwise treatment will be the supportive care measures we discussed in clinic today.  [facility]  They state they understand and are agreeable.  We discussed viral versus bacterial infection, and I informed patient that they have a self-limiting viral URI at this time and antibiotics are not an effective treatment for this.    I instructed them that the management for this is supportive care-we discussed cough/deep breathing exercises, drink plenty of fluids, get plenty of rest, try a humidifier in bedroom at night to help keep mucous membranes moist during sleep, gargle with salt water/honey/OTC Cepacol lozenges to help ease sore throat.    I instr patient they may use OTC cold and flu meds to treat symptoms  (caution regarding checking ingredients as some meds contain tylenol); may use tylenol alternated with ibuprofen (if not allergic or contraindicated) for pain/fever - may take tylenol 1000mg every 6 hours, do not exceed 3000 mg in 24 hours; ibuprofen (if not contraindicated) start with lowest effective dose, 200mg every 8 hours, may increase to up to 400 mg every 8 hours if needed, take with food.  Doses in excess of 400 mg have not being shown to increase therapeutic effect however do increase propensity for side effects/complications.  Patient was instructed that they should feel better in 7-10 days, (but some viral illnesses can last 2 weeks or longer, with residual cough possible for over a month) but to return to clinic if symptoms do not improve or worsen after 10-14 days.    We did discuss red flags and when to return to urgent care versus when to go to the emergency room and strict ER precautions were given.    I did print written instructions for patient, and did go over the diagnosis including differentials, and side effects of each medication with them and answer their questions to the best of my ability.   Advised the patient to follow-up with the primary care physician for recheck, reevaluation, and consideration of further management.  Strict ER precautions discussed for any  chest pain, difficulty breathing, difficulty swallowing, wheezing, stridor, or drooling, fever that does not respond to ibuprofen and Tylenol, inability to tolerate fluids, dehydration, lethargy.    Patient states they have good understanding and they are agreeable with the plan of care.    - POCT CoV-2, Flu A/B, RSV by PCR    2. Contact with and (suspected) exposure to covid-19    3. Elevated blood pressure reading  I instructed patient regarding the long-term risks of having uncontrolled high blood pressure including cardiovascular disease, increased risk of PVD, MI, CVA. I instructed patient to get a home BP monitor from pharmacy, check their blood pressure twice a day, morning and evening, keep a written log, make an appointment to see PCP and take the log of blood pressures with them for PCP to review to decide whether antihypertensive therapy is indicated.  Patient states they understand instructions and will do so.               Pt is clinically stable at today's acute urgent care visit. Vital signs are normal and reassuring.  No acute distress noted. Appropriate for outpatient management at this time.        I personally reviewed prior external notes and test results pertinent to today's visit.  I have independently reviewed and interpreted all diagnostics ordered during this urgent care acute visit.        Please note that this dictation was created using voice recognition software. I have made a  reasonable attempt to correct obvious errors, but I expect that there are errors of grammar and possibly content that I did not discover before finalizing the note.    This note was electronically signed by SONYA Lujan, MARBELLA, THANG

## 2024-08-27 ENCOUNTER — OFFICE VISIT (OUTPATIENT)
Dept: URGENT CARE | Facility: PHYSICIAN GROUP | Age: 79
End: 2024-08-27
Payer: COMMERCIAL

## 2024-08-27 VITALS
WEIGHT: 167.2 LBS | DIASTOLIC BLOOD PRESSURE: 88 MMHG | RESPIRATION RATE: 15 BRPM | TEMPERATURE: 97.1 F | SYSTOLIC BLOOD PRESSURE: 132 MMHG | HEART RATE: 68 BPM | BODY MASS INDEX: 23.41 KG/M2 | OXYGEN SATURATION: 98 % | HEIGHT: 71 IN

## 2024-08-27 DIAGNOSIS — H60.502 ACUTE OTITIS EXTERNA OF LEFT EAR, UNSPECIFIED TYPE: ICD-10-CM

## 2024-08-27 DIAGNOSIS — G20.A1 PARKINSON'S DISEASE, UNSPECIFIED WHETHER DYSKINESIA PRESENT, UNSPECIFIED WHETHER MANIFESTATIONS FLUCTUATE (HCC): Chronic | ICD-10-CM

## 2024-08-27 PROCEDURE — 3075F SYST BP GE 130 - 139MM HG: CPT | Performed by: PHYSICIAN ASSISTANT

## 2024-08-27 PROCEDURE — 99214 OFFICE O/P EST MOD 30 MIN: CPT | Performed by: PHYSICIAN ASSISTANT

## 2024-08-27 PROCEDURE — 3079F DIAST BP 80-89 MM HG: CPT | Performed by: PHYSICIAN ASSISTANT

## 2024-08-27 RX ORDER — DONEPEZIL HYDROCHLORIDE 5 MG/1
5 TABLET, FILM COATED ORAL NIGHTLY
COMMUNITY

## 2024-08-27 RX ORDER — CIPROFLOXACIN AND DEXAMETHASONE 3; 1 MG/ML; MG/ML
4 SUSPENSION/ DROPS AURICULAR (OTIC) 2 TIMES DAILY
Qty: 2.8 ML | Refills: 0 | Status: SHIPPED | OUTPATIENT
Start: 2024-08-27 | End: 2024-09-03

## 2024-08-27 ASSESSMENT — ENCOUNTER SYMPTOMS
FOCAL WEAKNESS: 0
LOSS OF CONSCIOUSNESS: 0
VOMITING: 0
CHILLS: 0
FEVER: 0
NAUSEA: 0
DIZZINESS: 0
HEADACHES: 0
SPEECH CHANGE: 0

## 2024-08-27 ASSESSMENT — FIBROSIS 4 INDEX: FIB4 SCORE: 1.26

## 2024-08-27 NOTE — PROGRESS NOTES
Subjective:   Florin Brooks is a 78 y.o. male who presents for Other (L Ear discomfort, balance off x 1 day)        Patient presents with concerns of left ear discomfort that began after he began using in ear headphones 2 days ago.  He endorses pain with palpation and some discomfort when opening and closing his mouth.  Yesterday he was also feeling a clicking sensation with this.  No discharge, fevers, chills, hearing changes.  States that he has Parkinson's at baseline and was started on donepezil a couple weeks ago.  This morning his balance felt a bit off, but he is unsure if this is related.  Past medical history also significant for macular degeneration.       Review of Systems   Constitutional:  Negative for chills and fever.   HENT:  Positive for ear pain. Negative for congestion.    Gastrointestinal:  Negative for nausea and vomiting.   Neurological:  Negative for dizziness, speech change, focal weakness, loss of consciousness and headaches.       PMH:  has a past medical history of COPD (chronic obstructive pulmonary disease) ([facility]) (7/5/2013), ED (erectile dysfunction) (7/5/2013), Osteoarthritis (7/5/2013), Shingles, and Shortness of breath (7/5/2013).  MEDS:   Current Outpatient Medications:     donepezil (ARICEPT) 5 MG Tab, Take 5 mg by mouth every evening., Disp: , Rfl:     ciprofloxacin/dexamethasone (CIPRODEX) 0.3-0.1 % Suspension, Administer 4 Drops into the left ear 2 times a day for 7 days., Disp: 2.8 mL, Rfl: 0    vitamin D2, Ergocalciferol, (DRISDOL) 1.25 MG (10960 UT) Cap capsule, Take  by mouth every 7 days., Disp: , Rfl:     benzonatate (TESSALON) 100 MG Cap, Take 200 mg by mouth 3 times a day as needed for Cough., Disp: , Rfl:     guaiFENesin ER (MUCINEX) 600 MG TABLET SR 12 HR, Take 600 mg by mouth every 12 hours., Disp: , Rfl:     fluticasone-salmeterol (ADVAIR) 100-50 MCG/ACT AEROSOL POWDER, BREATH ACTIVATED, Inhale 1 puff by mouth 2 times a day., Disp: 3 Each, Rfl: 3    albuterol  "108 (90 Base) MCG/ACT Aero Soln inhalation aerosol, Inhale 2 Puffs every 6 hours as needed for Shortness of Breath., Disp: 8.5 g, Rfl: 0    carbidopa-levodopa (SINEMET)  MG Tab, Take 1 Tablet by mouth 3 times a day., Disp: , Rfl:     cetirizine (ZYRTEC) 10 MG Tab, Take 10 mg by mouth every day., Disp: , Rfl:     potassium chloride SA (KDUR) 20 MEQ Tab CR, Take 1 Tablet by mouth every day. (Patient not taking: Reported on 5/20/2024), Disp: 7 Tablet, Rfl: 0    lisinopril-hydrochlorothiazide (PRINZIDE) 10-12.5 MG per tablet, Take 1 Tablet by mouth every day. (Patient not taking: Reported on 8/27/2024), Disp: 90 Tablet, Rfl: 3  ALLERGIES:   Allergies   Allergen Reactions    Flagyl [Metronidazole Hcl] Anaphylaxis     SURGHX:   Past Surgical History:   Procedure Laterality Date    COLON RESECTION      EYE SURGERY      muscle surgery    FOOT SURGERY      neuroma/ganglion cyst    HERNIA REPAIR      KNEE ARTHROSCOPY      bilaterally     SOCHX:  reports that he quit smoking about 25 years ago. His smoking use included cigarettes. He has never used smokeless tobacco. He reports that he does not currently use drugs after having used the following drugs: Marijuana and Oral. He reports that he does not drink alcohol.  FH: Family history was reviewed, no pertinent findings to report   Objective:   /88   Pulse 68   Temp 36.2 °C (97.1 °F) (Temporal)   Resp 15   Ht 1.803 m (5' 11\")   Wt 75.8 kg (167 lb 3.2 oz)   SpO2 98%   BMI 23.32 kg/m²   Physical Exam  Vitals reviewed.   Constitutional:       General: He is not in acute distress.     Appearance: Normal appearance. He is well-developed. He is not toxic-appearing.   HENT:      Head: Normocephalic and atraumatic.      Right Ear: External ear normal.      Left Ear: External ear normal.      Ears:      Comments: Patient has a narrow, curving EACs bilaterally.  Unable to visualize TMs bilaterally due to anatomy, cerumen and hair obstructing view.  Right EAC is within " normal limits.  Left EAC is erythematous and edematous along the inferior aspect.  No visible discharge.  Patient reports discomfort with otic exam.  Mastoid processes nontender bilaterally.     Nose: Nose normal.      Mouth/Throat:      Comments: No tenderness over the TMJ and jaw occlusion is normal.  No facial rashes or lesions.  Cardiovascular:      Rate and Rhythm: Normal rate and regular rhythm.   Pulmonary:      Effort: Pulmonary effort is normal. No respiratory distress.      Breath sounds: No stridor.   Skin:     General: Skin is dry.   Neurological:      Comments: Alert and oriented.    Psychiatric:         Speech: Speech normal.         Behavior: Behavior normal.           Assessment/Plan:   1. Acute otitis externa of left ear, unspecified type  - ciprofloxacin/dexamethasone (CIPRODEX) 0.3-0.1 % Suspension; Administer 4 Drops into the left ear 2 times a day for 7 days.  Dispense: 2.8 mL; Refill: 0    2. Parkinson's disease, unspecified whether dyskinesia present, unspecified whether manifestations fluctuate ([facility])    Other orders  - donepezil (ARICEPT) 5 MG Tab; Take 5 mg by mouth every evening.    Exam today consistent with otitis externa.  Recommend that we start him on Ciprodex today.  If symptoms fail to improve within 2 to 3 days, worsen any point or new symptoms develop see PCP or return to clinic for reevaluation.    If balance issues recur recommend that he follow-up immediate with neurology or be reevaluated in the emergency department.

## 2024-09-20 ENCOUNTER — OFFICE VISIT (OUTPATIENT)
Dept: URGENT CARE | Facility: PHYSICIAN GROUP | Age: 79
End: 2024-09-20
Payer: COMMERCIAL

## 2024-09-20 VITALS
HEIGHT: 71 IN | SYSTOLIC BLOOD PRESSURE: 140 MMHG | HEART RATE: 71 BPM | OXYGEN SATURATION: 97 % | TEMPERATURE: 98.1 F | WEIGHT: 166.2 LBS | DIASTOLIC BLOOD PRESSURE: 80 MMHG | BODY MASS INDEX: 23.27 KG/M2 | RESPIRATION RATE: 16 BRPM

## 2024-09-20 DIAGNOSIS — J44.1 COPD EXACERBATION (HCC): ICD-10-CM

## 2024-09-20 DIAGNOSIS — R05.1 ACUTE COUGH: ICD-10-CM

## 2024-09-20 PROCEDURE — 99214 OFFICE O/P EST MOD 30 MIN: CPT | Performed by: FAMILY MEDICINE

## 2024-09-20 PROCEDURE — 0241U POCT CEPHEID COV-2, FLU A/B, RSV - PCR: CPT | Performed by: FAMILY MEDICINE

## 2024-09-20 PROCEDURE — 3077F SYST BP >= 140 MM HG: CPT | Performed by: FAMILY MEDICINE

## 2024-09-20 PROCEDURE — 3079F DIAST BP 80-89 MM HG: CPT | Performed by: FAMILY MEDICINE

## 2024-09-20 RX ORDER — ALBUTEROL SULFATE 90 UG/1
2 INHALANT RESPIRATORY (INHALATION) EVERY 4 HOURS PRN
Qty: 1 EACH | Refills: 0 | Status: SHIPPED | OUTPATIENT
Start: 2024-09-20

## 2024-09-20 RX ORDER — DEXTROMETHORPHAN HYDROBROMIDE AND PROMETHAZINE HYDROCHLORIDE 15; 6.25 MG/5ML; MG/5ML
5 SYRUP ORAL 4 TIMES DAILY PRN
Qty: 120 ML | Refills: 0 | Status: SHIPPED | OUTPATIENT
Start: 2024-09-20

## 2024-09-20 RX ORDER — PREDNISONE 20 MG/1
40 TABLET ORAL DAILY
Qty: 10 TABLET | Refills: 0 | Status: SHIPPED | OUTPATIENT
Start: 2024-09-20 | End: 2024-09-25

## 2024-09-20 RX ORDER — DOXYCYCLINE HYCLATE 100 MG
100 TABLET ORAL 2 TIMES DAILY
Qty: 10 TABLET | Refills: 0 | Status: SHIPPED | OUTPATIENT
Start: 2024-09-20 | End: 2024-09-25

## 2024-09-20 ASSESSMENT — ENCOUNTER SYMPTOMS
MYALGIAS: 0
VOMITING: 0
NAUSEA: 0
EYE DISCHARGE: 0
EYE REDNESS: 0
WEIGHT LOSS: 0

## 2024-09-20 ASSESSMENT — FIBROSIS 4 INDEX: FIB4 SCORE: 1.26

## 2024-09-20 NOTE — PROGRESS NOTES
"Subjective     Florin Brooks is a 78 y.o. male who presents with Cough (Cough has been taking old medications to treat the cough, and SOB X 3 days. )            3 days productive cough without blood in sputum. No fever. +SOB/wheeze. PMH COPD. PMH pneumonia remotely. Albuterol is . Associated nasal congestion and rhinorrhea. No relief of cough with tessalon perles. No other aggravating or alleviating factors.        Review of Systems   Constitutional:  Negative for malaise/fatigue and weight loss.   Eyes:  Negative for discharge and redness.   Cardiovascular:  Negative for leg swelling.   Gastrointestinal:  Negative for nausea and vomiting.   Musculoskeletal:  Negative for joint pain and myalgias.   Skin:  Negative for itching and rash.              Objective     BP (!) 140/80 (BP Location: Right arm, Patient Position: Sitting, BP Cuff Size: Adult)   Pulse 71   Temp 36.7 °C (98.1 °F) (Temporal)   Resp 16   Ht 1.803 m (5' 11\")   Wt 75.4 kg (166 lb 3.2 oz)   SpO2 97%   BMI 23.18 kg/m²      Physical Exam  Constitutional:       General: He is not in acute distress.     Appearance: He is well-developed.   HENT:      Head: Normocephalic and atraumatic.      Nose: Congestion and rhinorrhea present.      Mouth/Throat:      Mouth: Mucous membranes are moist.      Pharynx: No posterior oropharyngeal erythema.   Eyes:      Conjunctiva/sclera: Conjunctivae normal.   Cardiovascular:      Rate and Rhythm: Normal rate and regular rhythm.      Heart sounds: Normal heart sounds. No murmur heard.  Pulmonary:      Effort: Pulmonary effort is normal.      Breath sounds: Wheezing present.   Musculoskeletal:         General: No swelling.      Right lower leg: No edema.      Left lower leg: No edema.   Skin:     General: Skin is warm and dry.      Findings: No rash.   Neurological:      Mental Status: He is alert.                             Assessment & Plan        1. Acute cough  promethazine-dextromethorphan " (PROMETHAZINE-DM) 6.25-15 MG/5ML syrup    POCT CEPHEID COV-2, FLU A/B, RSV - PCR      2. COPD exacerbation (HCC)  doxycycline (VIBRAMYCIN) 100 MG Tab    predniSONE (DELTASONE) 20 MG Tab    albuterol 108 (90 Base) MCG/ACT Aero Soln inhalation aerosol        Differential diagnosis, natural history, supportive care, and indications for immediate follow-up were discussed.     F/u studies

## 2024-10-08 ENCOUNTER — HOSPITAL ENCOUNTER (OUTPATIENT)
Dept: LAB | Facility: MEDICAL CENTER | Age: 79
End: 2024-10-08
Attending: INTERNAL MEDICINE
Payer: COMMERCIAL

## 2024-10-08 DIAGNOSIS — R73.03 PREDIABETES: Chronic | ICD-10-CM

## 2024-10-08 DIAGNOSIS — E55.9 VITAMIN D DEFICIENCY: Chronic | ICD-10-CM

## 2024-10-08 DIAGNOSIS — E78.5 DYSLIPIDEMIA: Chronic | ICD-10-CM

## 2024-10-08 DIAGNOSIS — E87.6 HYPOKALEMIA: ICD-10-CM

## 2024-10-08 LAB
25(OH)D3 SERPL-MCNC: 31 NG/ML (ref 30–100)
ALT SERPL-CCNC: 10 U/L (ref 2–50)
ANION GAP SERPL CALC-SCNC: 16 MMOL/L (ref 7–16)
BUN SERPL-MCNC: 14 MG/DL (ref 8–22)
CALCIUM SERPL-MCNC: 9.2 MG/DL (ref 8.5–10.5)
CHLORIDE SERPL-SCNC: 103 MMOL/L (ref 96–112)
CHOLEST SERPL-MCNC: 149 MG/DL (ref 100–199)
CO2 SERPL-SCNC: 23 MMOL/L (ref 20–33)
CREAT SERPL-MCNC: 0.84 MG/DL (ref 0.5–1.4)
EST. AVERAGE GLUCOSE BLD GHB EST-MCNC: 111 MG/DL
FASTING STATUS PATIENT QL REPORTED: NORMAL
GFR SERPLBLD CREATININE-BSD FMLA CKD-EPI: 89 ML/MIN/1.73 M 2
GLUCOSE SERPL-MCNC: 91 MG/DL (ref 65–99)
HBA1C MFR BLD: 5.5 % (ref 4–5.6)
HDLC SERPL-MCNC: 41 MG/DL
LDLC SERPL CALC-MCNC: 90 MG/DL
POTASSIUM SERPL-SCNC: 3.3 MMOL/L (ref 3.6–5.5)
SODIUM SERPL-SCNC: 142 MMOL/L (ref 135–145)
TRIGL SERPL-MCNC: 89 MG/DL (ref 0–149)

## 2024-10-08 PROCEDURE — 84460 ALANINE AMINO (ALT) (SGPT): CPT

## 2024-10-08 PROCEDURE — 36415 COLL VENOUS BLD VENIPUNCTURE: CPT

## 2024-10-08 PROCEDURE — 80061 LIPID PANEL: CPT

## 2024-10-08 PROCEDURE — 82306 VITAMIN D 25 HYDROXY: CPT

## 2024-10-08 PROCEDURE — 83036 HEMOGLOBIN GLYCOSYLATED A1C: CPT

## 2024-10-08 PROCEDURE — 80048 BASIC METABOLIC PNL TOTAL CA: CPT

## 2024-10-08 RX ORDER — POTASSIUM CHLORIDE 1500 MG/1
20 TABLET, EXTENDED RELEASE ORAL DAILY
Qty: 7 TABLET | Refills: 0 | Status: SHIPPED | OUTPATIENT
Start: 2024-10-08 | End: 2024-10-30

## 2024-10-15 ENCOUNTER — OFFICE VISIT (OUTPATIENT)
Dept: MEDICAL GROUP | Facility: PHYSICIAN GROUP | Age: 79
End: 2024-10-15
Payer: COMMERCIAL

## 2024-10-15 VITALS
HEIGHT: 71 IN | BODY MASS INDEX: 22.79 KG/M2 | TEMPERATURE: 98 F | WEIGHT: 162.8 LBS | DIASTOLIC BLOOD PRESSURE: 68 MMHG | HEART RATE: 64 BPM | OXYGEN SATURATION: 95 % | SYSTOLIC BLOOD PRESSURE: 112 MMHG

## 2024-10-15 DIAGNOSIS — H35.30 MACULAR DEGENERATION OF BOTH EYES, UNSPECIFIED TYPE: ICD-10-CM

## 2024-10-15 DIAGNOSIS — I10 ESSENTIAL HYPERTENSION: Chronic | ICD-10-CM

## 2024-10-15 DIAGNOSIS — J44.9 CHRONIC OBSTRUCTIVE PULMONARY DISEASE, UNSPECIFIED COPD TYPE (HCC): ICD-10-CM

## 2024-10-15 DIAGNOSIS — G20.A1 PARKINSON'S DISEASE, UNSPECIFIED WHETHER DYSKINESIA PRESENT, UNSPECIFIED WHETHER MANIFESTATIONS FLUCTUATE (HCC): Chronic | ICD-10-CM

## 2024-10-15 DIAGNOSIS — E78.5 DYSLIPIDEMIA: Chronic | ICD-10-CM

## 2024-10-15 DIAGNOSIS — R63.4 ABNORMAL WEIGHT LOSS: ICD-10-CM

## 2024-10-15 DIAGNOSIS — E55.9 VITAMIN D DEFICIENCY: Chronic | ICD-10-CM

## 2024-10-15 DIAGNOSIS — E87.6 HYPOKALEMIA: ICD-10-CM

## 2024-10-15 PROCEDURE — 3078F DIAST BP <80 MM HG: CPT | Performed by: INTERNAL MEDICINE

## 2024-10-15 PROCEDURE — 99215 OFFICE O/P EST HI 40 MIN: CPT | Performed by: INTERNAL MEDICINE

## 2024-10-15 PROCEDURE — 3074F SYST BP LT 130 MM HG: CPT | Performed by: INTERNAL MEDICINE

## 2024-10-15 RX ORDER — FLUTICASONE PROPIONATE AND SALMETEROL 100; 50 UG/1; UG/1
1 POWDER RESPIRATORY (INHALATION) 2 TIMES DAILY
Qty: 3 EACH | Refills: 3 | Status: SHIPPED | OUTPATIENT
Start: 2024-10-15

## 2024-10-15 ASSESSMENT — FIBROSIS 4 INDEX: FIB4 SCORE: 1.55

## 2024-10-21 ENCOUNTER — TELEPHONE (OUTPATIENT)
Dept: MEDICAL GROUP | Facility: PHYSICIAN GROUP | Age: 79
End: 2024-10-21
Payer: COMMERCIAL

## 2024-10-22 ENCOUNTER — HOSPITAL ENCOUNTER (OUTPATIENT)
Dept: LAB | Facility: MEDICAL CENTER | Age: 79
End: 2024-10-22
Attending: INTERNAL MEDICINE
Payer: COMMERCIAL

## 2024-10-22 DIAGNOSIS — R63.4 ABNORMAL WEIGHT LOSS: ICD-10-CM

## 2024-10-22 LAB
ALBUMIN SERPL BCP-MCNC: 3.6 G/DL (ref 3.2–4.9)
ALP SERPL-CCNC: 79 U/L (ref 30–99)
ALT SERPL-CCNC: 13 U/L (ref 2–50)
APPEARANCE UR: CLEAR
AST SERPL-CCNC: 16 U/L (ref 12–45)
BASOPHILS # BLD AUTO: 0.6 % (ref 0–1.8)
BASOPHILS # BLD: 0.05 K/UL (ref 0–0.12)
BILIRUB CONJ SERPL-MCNC: 0.5 MG/DL (ref 0.1–0.5)
BILIRUB INDIRECT SERPL-MCNC: 0.6 MG/DL (ref 0–1)
BILIRUB SERPL-MCNC: 1.1 MG/DL (ref 0.1–1.5)
BILIRUB UR QL STRIP.AUTO: NEGATIVE
COLOR UR: YELLOW
EOSINOPHIL # BLD AUTO: 0.13 K/UL (ref 0–0.51)
EOSINOPHIL NFR BLD: 1.6 % (ref 0–6.9)
ERYTHROCYTE [DISTWIDTH] IN BLOOD BY AUTOMATED COUNT: 44.3 FL (ref 35.9–50)
GLUCOSE UR STRIP.AUTO-MCNC: NEGATIVE MG/DL
HCT VFR BLD AUTO: 42.8 % (ref 42–52)
HGB BLD-MCNC: 13.5 G/DL (ref 14–18)
IMM GRANULOCYTES # BLD AUTO: 0.03 K/UL (ref 0–0.11)
IMM GRANULOCYTES NFR BLD AUTO: 0.4 % (ref 0–0.9)
KETONES UR STRIP.AUTO-MCNC: NEGATIVE MG/DL
LEUKOCYTE ESTERASE UR QL STRIP.AUTO: NEGATIVE
LYMPHOCYTES # BLD AUTO: 1.69 K/UL (ref 1–4.8)
LYMPHOCYTES NFR BLD: 20.8 % (ref 22–41)
MCH RBC QN AUTO: 29 PG (ref 27–33)
MCHC RBC AUTO-ENTMCNC: 31.5 G/DL (ref 32.3–36.5)
MCV RBC AUTO: 91.8 FL (ref 81.4–97.8)
MICRO URNS: NORMAL
MONOCYTES # BLD AUTO: 0.49 K/UL (ref 0–0.85)
MONOCYTES NFR BLD AUTO: 6 % (ref 0–13.4)
NEUTROPHILS # BLD AUTO: 5.73 K/UL (ref 1.82–7.42)
NEUTROPHILS NFR BLD: 70.6 % (ref 44–72)
NITRITE UR QL STRIP.AUTO: NEGATIVE
NRBC # BLD AUTO: 0 K/UL
NRBC BLD-RTO: 0 /100 WBC (ref 0–0.2)
PH UR STRIP.AUTO: 6.5 [PH] (ref 5–8)
PLATELET # BLD AUTO: 398 K/UL (ref 164–446)
PMV BLD AUTO: 9.4 FL (ref 9–12.9)
PROT SERPL-MCNC: 6.3 G/DL (ref 6–8.2)
PROT UR QL STRIP: NEGATIVE MG/DL
RBC # BLD AUTO: 4.66 M/UL (ref 4.7–6.1)
RBC UR QL AUTO: NEGATIVE
SP GR UR STRIP.AUTO: 1.01
UROBILINOGEN UR STRIP.AUTO-MCNC: 1 MG/DL
WBC # BLD AUTO: 8.1 K/UL (ref 4.8–10.8)

## 2024-10-22 PROCEDURE — 80076 HEPATIC FUNCTION PANEL: CPT

## 2024-10-22 PROCEDURE — 85025 COMPLETE CBC W/AUTO DIFF WBC: CPT

## 2024-10-22 PROCEDURE — 87086 URINE CULTURE/COLONY COUNT: CPT

## 2024-10-22 PROCEDURE — 81003 URINALYSIS AUTO W/O SCOPE: CPT

## 2024-10-22 PROCEDURE — 36415 COLL VENOUS BLD VENIPUNCTURE: CPT

## 2024-10-24 LAB
BACTERIA UR CULT: NORMAL
SIGNIFICANT IND 70042: NORMAL
SITE SITE: NORMAL
SOURCE SOURCE: NORMAL

## 2024-10-26 DIAGNOSIS — D64.9 ANEMIA, UNSPECIFIED TYPE: ICD-10-CM

## 2024-10-29 ENCOUNTER — TELEPHONE (OUTPATIENT)
Dept: MEDICAL GROUP | Facility: PHYSICIAN GROUP | Age: 79
End: 2024-10-29
Payer: COMMERCIAL

## 2024-10-29 NOTE — TELEPHONE ENCOUNTER
Name: Flora with [facility].     Message: Flora called and left a voicemail. Flora states that she is going through patient's medication list. Flora states that patient told them that his Hydrochlorothiazide 10-12.5 was discontinued and Flora wants to clarify that before she removes this medication from his medication list.

## 2024-11-07 ENCOUNTER — OFFICE VISIT (OUTPATIENT)
Dept: URGENT CARE | Facility: PHYSICIAN GROUP | Age: 79
End: 2024-11-07
Payer: COMMERCIAL

## 2024-11-07 VITALS
DIASTOLIC BLOOD PRESSURE: 82 MMHG | HEIGHT: 71 IN | OXYGEN SATURATION: 98 % | HEART RATE: 73 BPM | BODY MASS INDEX: 22.53 KG/M2 | TEMPERATURE: 98 F | WEIGHT: 160.94 LBS | RESPIRATION RATE: 16 BRPM | SYSTOLIC BLOOD PRESSURE: 126 MMHG

## 2024-11-07 DIAGNOSIS — L73.9 FOLLICULITIS: ICD-10-CM

## 2024-11-07 PROCEDURE — 3079F DIAST BP 80-89 MM HG: CPT | Performed by: FAMILY MEDICINE

## 2024-11-07 PROCEDURE — 3074F SYST BP LT 130 MM HG: CPT | Performed by: FAMILY MEDICINE

## 2024-11-07 PROCEDURE — 99213 OFFICE O/P EST LOW 20 MIN: CPT | Performed by: FAMILY MEDICINE

## 2024-11-07 RX ORDER — TRIAMCINOLONE ACETONIDE 1 MG/G
1 OINTMENT TOPICAL 2 TIMES DAILY
Qty: 30 G | Refills: 0 | Status: SHIPPED | OUTPATIENT
Start: 2024-11-07

## 2024-11-07 RX ORDER — SULFAMETHOXAZOLE AND TRIMETHOPRIM 800; 160 MG/1; MG/1
1 TABLET ORAL 2 TIMES DAILY
Qty: 14 TABLET | Refills: 0 | Status: SHIPPED | OUTPATIENT
Start: 2024-11-07 | End: 2024-11-14

## 2024-11-07 ASSESSMENT — FIBROSIS 4 INDEX: FIB4 SCORE: 0.87

## 2024-11-07 NOTE — PROGRESS NOTES
Subjective:      Chief Complaint   Patient presents with    Rash     11/2/20224 p/t broke out in hives all over body                    Subjective:      Chief Complaint   Patient presents with    Rash     11/2/20224 p/t broke out in hives all over body   Itchy p/t dose not know what caused this.   P/t has history of cancer and takes meds to manage his parkinson's                Rash  This is a new problem. The current episode started in the past 7 days. The problem is unchanged. Pain location: ARMS, LEGS. The rash is characterized by redness and itchiness. He was exposed to nothing. Pertinent negatives include no cough, diarrhea or fever. Past treatments include topical steroids. The treatment provided mild relief. There is no history of allergies.       Current Outpatient Medications on File Prior to Visit   Medication Sig Dispense Refill    donepezil (ARICEPT) 5 MG Tab Take 5 mg by mouth every evening.      vitamin D2, Ergocalciferol, (DRISDOL) 1.25 MG (20431 UT) Cap capsule Take  by mouth every 7 days.      albuterol 108 (90 Base) MCG/ACT Aero Soln inhalation aerosol Inhale 2 Puffs every 6 hours as needed for Shortness of Breath. 8.5 g 0    carbidopa-levodopa (SINEMET)  MG Tab Take 1 Tablet by mouth 3 times a day.      cetirizine (ZYRTEC) 10 MG Tab Take 10 mg by mouth every day.      fluticasone-salmeterol (ADVAIR) 100-50 MCG/ACT AEROSOL POWDER, BREATH ACTIVATED Inhale 1 Puff 2 times a day. (Patient not taking: Reported on 11/7/2024) 3 Each 3    promethazine-dextromethorphan (PROMETHAZINE-DM) 6.25-15 MG/5ML syrup Take 5 mL by mouth 4 times a day as needed for Cough. (Patient not taking: Reported on 11/7/2024) 120 mL 0    benzonatate (TESSALON) 100 MG Cap Take 200 mg by mouth 3 times a day as needed for Cough. (Patient not taking: Reported on 11/7/2024)       No current facility-administered medications on file prior to visit.       Social History     Tobacco Use    Smoking status: Former     Current  "packs/day: 0.00     Types: Cigarettes     Quit date: 1999     Years since quittin.3    Smokeless tobacco: Never   Vaping Use    Vaping status: Never Used   Substance Use Topics    Alcohol use: No     Alcohol/week: 0.0 oz    Drug use: Not Currently     Types: Marijuana, Oral     Comment: CBD nightly          Past Medical History:   Diagnosis Date    Osteoarthritis 2013    Shortness of breath 2013    COPD (chronic obstructive pulmonary disease) (HCC) 2013    ED (erectile dysfunction) 2013    Shingles              Review of Systems   Constitutional: Negative for fever.   Respiratory: Negative for cough.    Gastrointestinal: Negative for diarrhea.   Skin: Positive for rash.          Objective:     /82 (BP Location: Right arm, Patient Position: Sitting, BP Cuff Size: Adult)   Pulse 73   Temp 36.7 °C (98 °F) (Temporal)   Resp 16   Ht 1.803 m (5' 11\")   Wt 73 kg (160 lb 15 oz)   SpO2 98%     Physical Exam   Constitutional: pt is oriented to person, place, and time. Pt appears well-developed. No distress.   HENT:   Head: Normocephalic and atraumatic.   Eyes: Conjunctivae are normal.   Cardiovascular: Normal rate, regular rhythm and normal heart sounds.    Pulmonary/Chest: Effort normal and breath sounds normal. No respiratory distress.   Neurological: pt is alert and oriented to person, place, and time. No cranial nerve deficit.   Skin: Skin is warm. Rash (diffuse, multiple small papules and pustules on an erythematous base that are pierced by a central hair ) noted. Pt is not diaphoretic. There is erythema.   Psychiatric:  behavior is normal.   Nursing note and vitals reviewed.              Assessment/Plan:     1. Folliculitis     - sulfamethoxazole-trimethoprim (BACTRIM DS) 800-160 MG tablet; Take 1 Tablet by mouth 2 times a day for 7 days.  Dispense: 14 Tablet; Refill: 0  - triamcinolone acetonide (KENALOG) 0.1 % Ointment; Apply 1 g topically 2 times a day.  Dispense: 30 g; Refill: " 0    Differential diagnosis, natural history, supportive care, and indications for immediate follow-up discussed. All questions answered. Patient agrees with the plan of care.     Follow-up as needed if symptoms worsen or fail to improve to PCP, Urgent care or Emergency Room.     I have personally reviewed prior external notes and test results pertinent to today's visit.  I have independently reviewed and interpreted all diagnostics ordered during this urgent care acute visit.

## 2024-12-30 ENCOUNTER — HOSPITAL ENCOUNTER (OUTPATIENT)
Dept: LAB | Facility: MEDICAL CENTER | Age: 79
End: 2024-12-30
Attending: INTERNAL MEDICINE
Payer: COMMERCIAL

## 2024-12-30 DIAGNOSIS — D64.9 ANEMIA, UNSPECIFIED TYPE: ICD-10-CM

## 2024-12-30 DIAGNOSIS — E87.6 HYPOKALEMIA: ICD-10-CM

## 2024-12-30 LAB
FERRITIN SERPL-MCNC: 167 NG/ML (ref 22–322)
FOLATE SERPL-MCNC: 9 NG/ML
HEMOCCULT STL QL: NEGATIVE
IRON SERPL-MCNC: 52 UG/DL (ref 50–180)
POTASSIUM SERPL-SCNC: 3.7 MMOL/L (ref 3.6–5.5)
VIT B12 SERPL-MCNC: 289 PG/ML (ref 211–911)

## 2024-12-30 PROCEDURE — 82728 ASSAY OF FERRITIN: CPT

## 2024-12-30 PROCEDURE — 83540 ASSAY OF IRON: CPT

## 2024-12-30 PROCEDURE — 82272 OCCULT BLD FECES 1-3 TESTS: CPT

## 2024-12-30 PROCEDURE — 82746 ASSAY OF FOLIC ACID SERUM: CPT

## 2024-12-30 PROCEDURE — 82607 VITAMIN B-12: CPT

## 2024-12-30 PROCEDURE — 84132 ASSAY OF SERUM POTASSIUM: CPT

## 2024-12-30 PROCEDURE — 36415 COLL VENOUS BLD VENIPUNCTURE: CPT

## 2025-01-11 DIAGNOSIS — I10 ESSENTIAL HYPERTENSION: Chronic | ICD-10-CM

## 2025-01-13 ENCOUNTER — HOSPITAL ENCOUNTER (OUTPATIENT)
Dept: LAB | Facility: MEDICAL CENTER | Age: 80
End: 2025-01-13
Attending: INTERNAL MEDICINE
Payer: COMMERCIAL

## 2025-01-13 DIAGNOSIS — I10 ESSENTIAL HYPERTENSION: Chronic | ICD-10-CM

## 2025-01-13 PROCEDURE — 36415 COLL VENOUS BLD VENIPUNCTURE: CPT

## 2025-01-13 PROCEDURE — 82565 ASSAY OF CREATININE: CPT

## 2025-01-13 PROCEDURE — 84520 ASSAY OF UREA NITROGEN: CPT

## 2025-01-14 LAB
BUN SERPL-MCNC: 13 MG/DL (ref 8–22)
CREAT SERPL-MCNC: 0.93 MG/DL (ref 0.5–1.4)
GFR SERPLBLD CREATININE-BSD FMLA CKD-EPI: 83 ML/MIN/1.73 M 2

## 2025-01-17 ENCOUNTER — HOSPITAL ENCOUNTER (OUTPATIENT)
Dept: RADIOLOGY | Facility: MEDICAL CENTER | Age: 80
End: 2025-01-17
Attending: INTERNAL MEDICINE
Payer: COMMERCIAL

## 2025-01-17 DIAGNOSIS — R63.4 ABNORMAL WEIGHT LOSS: ICD-10-CM

## 2025-01-17 PROCEDURE — 700117 HCHG RX CONTRAST REV CODE 255: Performed by: INTERNAL MEDICINE

## 2025-01-17 PROCEDURE — 71260 CT THORAX DX C+: CPT

## 2025-01-17 RX ADMIN — IOHEXOL 100 ML: 350 INJECTION, SOLUTION INTRAVENOUS at 09:11

## 2025-01-18 DIAGNOSIS — J43.9 PULMONARY EMPHYSEMA, UNSPECIFIED EMPHYSEMA TYPE (HCC): ICD-10-CM

## 2025-01-18 DIAGNOSIS — K80.20 CALCULUS OF GALLBLADDER WITHOUT CHOLECYSTITIS WITHOUT OBSTRUCTION: ICD-10-CM

## 2025-01-18 DIAGNOSIS — J90 PLEURAL EFFUSION: ICD-10-CM

## 2025-01-18 DIAGNOSIS — J18.9 PNEUMONIA DUE TO INFECTIOUS ORGANISM, UNSPECIFIED LATERALITY, UNSPECIFIED PART OF LUNG: ICD-10-CM

## 2025-01-18 RX ORDER — DOXYCYCLINE 100 MG/1
100 CAPSULE ORAL 2 TIMES DAILY
Qty: 14 CAPSULE | Refills: 0 | Status: SHIPPED | OUTPATIENT
Start: 2025-01-18

## 2025-02-10 ENCOUNTER — PATIENT MESSAGE (OUTPATIENT)
Dept: MEDICAL GROUP | Facility: PHYSICIAN GROUP | Age: 80
End: 2025-02-10
Payer: COMMERCIAL

## 2025-02-26 PROBLEM — J84.9 INTERSTITIAL LUNG DISEASE (HCC): Status: ACTIVE | Noted: 2025-02-26

## 2025-02-26 PROBLEM — J84.9 INTERSTITIAL LUNG DISEASE (HCC): Chronic | Status: ACTIVE | Noted: 2025-02-26

## 2025-03-31 ENCOUNTER — APPOINTMENT (OUTPATIENT)
Dept: MEDICAL GROUP | Facility: PHYSICIAN GROUP | Age: 80
End: 2025-03-31
Payer: COMMERCIAL

## 2025-03-31 VITALS
RESPIRATION RATE: 16 BRPM | HEART RATE: 74 BPM | SYSTOLIC BLOOD PRESSURE: 134 MMHG | TEMPERATURE: 98.7 F | HEIGHT: 71 IN | BODY MASS INDEX: 20.56 KG/M2 | WEIGHT: 146.9 LBS | DIASTOLIC BLOOD PRESSURE: 80 MMHG | OXYGEN SATURATION: 98 %

## 2025-03-31 DIAGNOSIS — E78.5 DYSLIPIDEMIA: Chronic | ICD-10-CM

## 2025-03-31 DIAGNOSIS — D64.9 ANEMIA, UNSPECIFIED TYPE: ICD-10-CM

## 2025-03-31 DIAGNOSIS — E55.9 VITAMIN D DEFICIENCY: Chronic | ICD-10-CM

## 2025-03-31 DIAGNOSIS — I10 ESSENTIAL HYPERTENSION: Chronic | ICD-10-CM

## 2025-03-31 DIAGNOSIS — J84.9 INTERSTITIAL LUNG DISEASE (HCC): Chronic | ICD-10-CM

## 2025-03-31 DIAGNOSIS — R93.89 ABNORMAL CHEST CT: ICD-10-CM

## 2025-03-31 DIAGNOSIS — J44.9 CHRONIC OBSTRUCTIVE PULMONARY DISEASE, UNSPECIFIED COPD TYPE (HCC): Chronic | ICD-10-CM

## 2025-03-31 DIAGNOSIS — R41.89 COGNITIVE IMPAIRMENT: ICD-10-CM

## 2025-03-31 DIAGNOSIS — K80.20 CALCULUS OF GALLBLADDER WITHOUT CHOLECYSTITIS WITHOUT OBSTRUCTION: Chronic | ICD-10-CM

## 2025-03-31 DIAGNOSIS — G20.A1 PARKINSON'S DISEASE, UNSPECIFIED WHETHER DYSKINESIA PRESENT, UNSPECIFIED WHETHER MANIFESTATIONS FLUCTUATE (HCC): ICD-10-CM

## 2025-03-31 PROBLEM — J18.9 PNEUMONIA DUE TO INFECTIOUS ORGANISM: Chronic | Status: RESOLVED | Noted: 2025-01-18 | Resolved: 2025-03-31

## 2025-03-31 RX ORDER — FLUTICASONE PROPIONATE AND SALMETEROL 250; 50 UG/1; UG/1
1 POWDER RESPIRATORY (INHALATION) EVERY 12 HOURS
COMMUNITY

## 2025-03-31 ASSESSMENT — FIBROSIS 4 INDEX: FIB4 SCORE: 0.88

## 2025-03-31 ASSESSMENT — PATIENT HEALTH QUESTIONNAIRE - PHQ9: CLINICAL INTERPRETATION OF PHQ2 SCORE: 0

## 2025-04-01 NOTE — ASSESSMENT & PLAN NOTE
Chronic condition.  The patient presently not taking medication.  Patient denies chest pain shortness of breath palpitation or near syncope.

## 2025-04-01 NOTE — PROGRESS NOTES
PRIMARY CARE CLINIC VISIT        Chief Complaint   Patient presents with    Follow-Up     Pulmonology       Pulmonology service  COPD  Interstitial lung disease  Abnormal chest CT scan  Gallstone  Parkinson disease  Cognitive impairment  Follow-up hypertension  Hyperlipidemia  Vitamin D deficiency  Anemia      History of Present Illness     Interstitial lung disease (HCC)  This is a chronic condition.  The patient was seen in an outside pulmonologist on February 6, 2025    Patient was diagnosed with interstitial lung disease UIP pattern COPD chronic cough with dyspnea on exertion  The patient has been using Advair daily and albuterol as needed.  Patient was noted with abnormal chest CT scan however according to pulmonologist this is not pneumonia and the patient likely does not need to be treated with antibiotic.    Patient no longer sees this pulmonologist.  Today I have recommended referral for the [facility] for second opinion as well as going follow-up evaluation      COPD (chronic obstructive pulmonary disease) (HCC)  Chronic condition.  Patient currently using Advair.  He denies significant shortness of breath or wheezing.  Patient uses albuterol as needed    Cholelithiasis  Chronic condition.  Previous chest CT scan showed patient with large gallstone associated with bile duct and pancreatic duct prominence.  Patient was referred to general surgery however he has not yet set up the appointment.  Today have given the patient the specialist contact information.  I have urged the patient to schedule an appointment to see the surgeon.  Patient voiced understanding.    Parkinson disease (HCC)  Chronic condition.  Patient followed by neurology service.  Patient presently taking Sinemet 3 times a day.  Symptoms are stable per patient report.    Cognitive impairment  This is a chronic condition.  Patient followed by neurology service.  Patient presently taking Aricept.    Essential  hypertension  Chronic condition.  The patient presently not taking medication.  Patient denies chest pain shortness of breath palpitation or near syncope.    Dyslipidemia  Chronic condition.  The patient currently on diet therapy.  Lipid panel result discussed with the patient.    Vitamin D deficiency  This is a chronic condition.  Patient currently taking vitamin D supplementation.  Previous vitamin D lab test result discussed with the patient.  Normal    Anemia  Chronic cond  Previous lab showed slightly low Hg 13.5  Pt asympt  Denies h.o bleeding  Recommend pt to repeat lab for followup    Current Outpatient Medications on File Prior to Visit   Medication Sig Dispense Refill    fluticasone-salmeterol (ADVAIR DISKUS) 250-50 MCG/ACT AEROSOL POWDER, BREATH ACTIVATED Inhale 1 Puff every 12 hours.      triamcinolone acetonide (KENALOG) 0.1 % Ointment Apply 1 g topically 2 times a day. 30 g 0    donepezil (ARICEPT) 5 MG Tab Take 5 mg by mouth every evening.      vitamin D2, Ergocalciferol, (DRISDOL) 1.25 MG (70263 UT) Cap capsule Take  by mouth every 7 days.      albuterol 108 (90 Base) MCG/ACT Aero Soln inhalation aerosol Inhale 2 Puffs every 6 hours as needed for Shortness of Breath. 8.5 g 0    carbidopa-levodopa (SINEMET)  MG Tab Take 1 Tablet by mouth 3 times a day.       No current facility-administered medications on file prior to visit.        Allergies: Patient has no active allergies.    Current Outpatient Medications Ordered in Epic   Medication Sig Dispense Refill    fluticasone-salmeterol (ADVAIR DISKUS) 250-50 MCG/ACT AEROSOL POWDER, BREATH ACTIVATED Inhale 1 Puff every 12 hours.      triamcinolone acetonide (KENALOG) 0.1 % Ointment Apply 1 g topically 2 times a day. 30 g 0    donepezil (ARICEPT) 5 MG Tab Take 5 mg by mouth every evening.      vitamin D2, Ergocalciferol, (DRISDOL) 1.25 MG (09771 UT) Cap capsule Take  by mouth every 7 days.      albuterol 108 (90 Base) MCG/ACT Aero Soln inhalation  aerosol Inhale 2 Puffs every 6 hours as needed for Shortness of Breath. 8.5 g 0    carbidopa-levodopa (SINEMET)  MG Tab Take 1 Tablet by mouth 3 times a day.       No current [facility]-ordered facility-administered medications on file.       Past Medical History:   Diagnosis Date    COPD (chronic obstructive pulmonary disease) (HCC) 2013    ED (erectile dysfunction) 2013    Osteoarthritis 2013    Shingles     Shortness of breath 2013       Past Surgical History:   Procedure Laterality Date    COLON RESECTION      EYE SURGERY      muscle surgery    FOOT SURGERY      neuroma/ganglion cyst    HERNIA REPAIR      KNEE ARTHROSCOPY      bilaterally       Family History   Problem Relation Age of Onset    Alcohol/Drug Father     Lung Disease Sister     Cancer Neg Hx     Diabetes Neg Hx     Stroke Neg Hx        Social History     Tobacco Use   Smoking Status Former    Current packs/day: 0.00    Types: Cigarettes    Quit date: 1999    Years since quittin.7   Smokeless Tobacco Never       Social History     Substance and Sexual Activity   Alcohol Use No    Alcohol/week: 0.0 oz       Review of systems  As per HPI above. All other systems reviewed and negative.      Past Medical, Social, and Family history reviewed and updated in [facility]       LAB DATA:     I have independently reviewed / interpreted labs    Lab Results   Component Value Date/Time    HBA1C 5.5 10/08/2024 06:58 AM    HBA1C 5.5 2024 06:45 AM    HBA1C 5.2 2022 07:31 AM        Lab Results   Component Value Date/Time    WBC 8.1 10/22/2024 07:37 AM    HEMOGLOBIN 13.5 (L) 10/22/2024 07:37 AM    HEMATOCRIT 42.8 10/22/2024 07:37 AM    MCV 91.8 10/22/2024 07:37 AM    PLATELETCT 398 10/22/2024 07:37 AM       Lab Results   Component Value Date/Time    SODIUM 142 10/08/2024 06:58 AM    POTASSIUM 3.7 2024 07:48 AM    GLUCOSE 91 10/08/2024 06:58 AM    BUN 13 2025 08:33 AM    CREATININE 0.93 2025 08:33 AM       Lab Results  "  Component Value Date/Time    CHOLSTRLTOT 149 10/08/2024 06:58 AM    TRIGLYCERIDE 89 10/08/2024 06:58 AM    HDL 41 10/08/2024 06:58 AM    LDL 90 10/08/2024 06:58 AM       Lab Results   Component Value Date/Time    ALTSGPT 13 10/22/2024 07:37 AM          Objective     /80 (BP Location: Right arm, Patient Position: Sitting, BP Cuff Size: Adult)   Pulse 74   Temp 37.1 °C (98.7 °F) (Temporal)   Resp 16   Ht 1.803 m (5' 11\")   Wt 66.6 kg (146 lb 14.4 oz)   SpO2 98%    Body mass index is 20.49 kg/m².    General: alert in no apparent distress.  Cardiovascular: regular rate and rhythm  Pulmonary: lungs : min exp wheezing   Gastrointestinal: BS present.       CT CHEST ABD PELVIS  IMPRESSION:        1. 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 No fibrosis evident.     2. Mild mediastinal adenopathy which is nonspecific. Trace pleural effusions. No evidence for tumor otherwise.     3. Mild emphysema.     4. Large gallstone without inflammation. Mild bile duct and pancreatic duct prominence could be chronic. Correlate with liver function tests.     5. Right inguinal surgical clips. Enlarged prostate. Bladder mostly empty.        Assessment and Plan     1. Chronic obstructive pulmonary disease, unspecified COPD type (HCC)  2. Interstitial lung disease (HCC)  3. Abnormal chest CT  Chronic condition.  CT scan of the chest abdomen pelvis result discussed with the patient and wife today.  Patient no longer does follow the pulmonologist side right now.  I have recommended for the patient to [facility] for second opinion and follow-up /  PFT  - Referral to Pulmonary     Meant for the patient to continue using Advair 1 puff twice daily.  Rinse mouth after use.  Patient may use albuterol as needed for rescue treatment.    4. Calculus of gallbladder without " cholecystitis without obstruction  Chronic condition.  Unstable.  Recommend patient follow-up with general surgery as previously recommended.  Today contact information of the specially given to the patient.  The patient stated that he will schedule an appointment.    5. Parkinson's disease, unspecified whether dyskinesia present, unspecified whether manifestations fluctuate (HCC)  Chronic stable.  Continue Sinemet  times a day.  Continue follow-up with neurologist.    6. Cognitive impairment  Stable.  Continue Aricept 5 mg daily.  Continue follow-up with neurologist    7. Essential hypertension  Chronic condition.  BP is in the acceptable level.  Patient currently not taking medication.  Continue to monitor.    8. Dyslipidemia  Chronic condition.  Lipid panel completed in October 2024 normal.  Continue to monitor    9. Vitamin D deficiency  Stable condition  Continue vitamin D2 50,000 unit once a week    10. Normocytic anemia  Chronic cond  Cause unclear  No h.o bleeding  Pt completed labs: iron , ferritin, folate, b12  in Dec 2024  Rec pt to get FIT test done  Repeat cbc ordered today            Time spent:   42   minutes     Reviewed medical records including:   pulmonologist note dated 6/10/2025  November 7, 2024 urgent care note  October 15, 2024 medical office note  September 20, 2024 urgent care note  August 27, 2024 urgent care note  Radiology report Jan 17 2025      Total time includes face to face time and non-face to face time.  Chart review before the visit, the actual patient visit, and time spent on documentation in the EMR after the visit.  Chart review/prep, review of other providers' records, Independent review of imagings/labs ,  time for history/examination , pt's counseling/education, ordering, prescribing, treatment plan discussed with patient, and care coordination.              Please note that this dictation was created using voice recognition software. I have made every reasonable  attempt to correct obvious errors, but I expect that there are errors of grammar and possibly content that I did not discover before finalizing the note.    Judah Sepulveda MD  Internal Medicine  [facility]

## 2025-04-01 NOTE — ASSESSMENT & PLAN NOTE
This is a chronic condition.  Patient followed by neurology service.  Patient presently taking Aricept.

## 2025-04-01 NOTE — ASSESSMENT & PLAN NOTE
Chronic condition.  The patient currently on diet therapy.  Lipid panel result discussed with the patient.

## 2025-04-01 NOTE — ASSESSMENT & PLAN NOTE
This is a chronic condition.  The patient was seen in an outside pulmonologist on February 6, 2025    Patient was diagnosed with interstitial lung disease UIP pattern COPD chronic cough with dyspnea on exertion  The patient has been using Advair daily and albuterol as needed.  Patient was noted with abnormal chest CT scan however according to pulmonologist this is not pneumonia and the patient likely does not need to be treated with antibiotic.    Patient no longer sees this pulmonologist.  Today I have recommended referral for the [facility] for second opinion as well as going follow-up evaluation

## 2025-04-01 NOTE — ASSESSMENT & PLAN NOTE
Chronic condition.  Patient currently using Advair.  He denies significant shortness of breath or wheezing.  Patient uses albuterol as needed

## 2025-04-01 NOTE — ASSESSMENT & PLAN NOTE
Chronic condition.  Patient followed by neurology service.  Patient presently taking Sinemet 3 times a day.  Symptoms are stable per patient report.

## 2025-04-01 NOTE — ASSESSMENT & PLAN NOTE
This is a chronic condition.  Patient currently taking vitamin D supplementation.  Previous vitamin D lab test result discussed with the patient.  Normal

## 2025-04-01 NOTE — ASSESSMENT & PLAN NOTE
Chronic condition.  Previous chest CT scan showed patient with large gallstone associated with bile duct and pancreatic duct prominence.  Patient was referred to general surgery however he has not yet set up the appointment.  Today have given the patient the specialist contact information.  I have urged the patient to schedule an appointment to see the surgeon.  Patient voiced understanding.

## 2025-04-07 NOTE — Clinical Note
REFERRAL APPROVAL NOTICE         Sent on April 7, 2025                   Florin Brooks  [address]   [address]  [facility] [address]                   Dear Mr. Brooks,    After a careful review of the medical information and benefit coverage, [facility] has processed your referral. See below for additional details.    If applicable, you must be actively enrolled with your insurance for coverage of the authorized service. If you have any questions regarding your coverage, please contact your insurance directly.    REFERRAL INFORMATION   Referral #:  [id number]  Referred-To Provider    Referred-By Provider:       Judah Sepulveda M.D.   [facility]      [address] [place] [address] [address]  [place] [address]  [phone] [address] [facility] [address]  [facility] [address]  [phone]    Referral Start Date:  03/31/2025  Referral End Date:   03/31/2026             SCHEDULING  If you do not already have an appointment, please call [phone] to make an appointment.     MORE INFORMATION  If you do not already have a Daily[employer] account, sign up at: [email or web address]  You can access your medical information, make appointments, see lab results, billing information, and more.  If you have questions regarding this referral, please contact  the [facility] Referrals department at:             [phone]. Monday - Friday 8:00AM - 5:00PM.     Sincerely,    [facility]

## 2025-05-13 ENCOUNTER — OFFICE VISIT (OUTPATIENT)
Dept: URGENT CARE | Facility: PHYSICIAN GROUP | Age: 80
End: 2025-05-13
Payer: COMMERCIAL

## 2025-05-13 VITALS
SYSTOLIC BLOOD PRESSURE: 132 MMHG | HEIGHT: 71 IN | RESPIRATION RATE: 14 BRPM | WEIGHT: 143 LBS | DIASTOLIC BLOOD PRESSURE: 84 MMHG | BODY MASS INDEX: 20.02 KG/M2 | HEART RATE: 70 BPM | OXYGEN SATURATION: 99 % | TEMPERATURE: 97.9 F

## 2025-05-13 DIAGNOSIS — H60.311 ACUTE DIFFUSE OTITIS EXTERNA OF RIGHT EAR: ICD-10-CM

## 2025-05-13 PROCEDURE — 3079F DIAST BP 80-89 MM HG: CPT

## 2025-05-13 PROCEDURE — 99213 OFFICE O/P EST LOW 20 MIN: CPT

## 2025-05-13 PROCEDURE — 3075F SYST BP GE 130 - 139MM HG: CPT

## 2025-05-13 RX ORDER — LISINOPRIL AND HYDROCHLOROTHIAZIDE 10; 12.5 MG/1; MG/1
1 TABLET ORAL DAILY
COMMUNITY

## 2025-05-13 RX ORDER — CIPROFLOXACIN AND DEXAMETHASONE 3; 1 MG/ML; MG/ML
SUSPENSION/ DROPS AURICULAR (OTIC)
Qty: 7.5 ML | Refills: 0 | Status: SHIPPED | OUTPATIENT
Start: 2025-05-13

## 2025-05-13 ASSESSMENT — FIBROSIS 4 INDEX: FIB4 SCORE: 0.88

## 2025-05-16 ASSESSMENT — ENCOUNTER SYMPTOMS
COUGH: 0
SHORTNESS OF BREATH: 0
MYALGIAS: 0
NAUSEA: 0
SORE THROAT: 0
VOMITING: 0
NECK PAIN: 0
CHILLS: 0
ABDOMINAL PAIN: 0
FEVER: 0
DIARRHEA: 0
HEADACHES: 0
RHINORRHEA: 0

## 2025-05-16 NOTE — PROGRESS NOTES
"Subjective:   Florin Brooks is a 79 y.o. male who presents for Otalgia (X 2 days Rt ear pain)      Otalgia   There is pain in the right ear. This is a new problem. The current episode started in the past 7 days. The problem has been gradually worsening. There has been no fever. Associated symptoms include hearing loss. Pertinent negatives include no abdominal pain, coughing, diarrhea, ear discharge, headaches, neck pain, rash, rhinorrhea, sore throat or vomiting. He has tried nothing for the symptoms. His past medical history is significant for hearing loss. There is no history of a chronic ear infection or a tympanostomy tube.       Review of Systems   Constitutional:  Negative for chills, fever and malaise/fatigue.   HENT:  Positive for ear pain (Right ear) and hearing loss. Negative for congestion, ear discharge, rhinorrhea and sore throat.    Respiratory:  Negative for cough and shortness of breath.    Cardiovascular:  Negative for chest pain.   Gastrointestinal:  Negative for abdominal pain, diarrhea, nausea and vomiting.   Genitourinary:  Negative for dysuria.   Musculoskeletal:  Negative for myalgias and neck pain.   Skin:  Negative for rash.   Neurological:  Negative for headaches.       Medications, Allergies, and current problem list reviewed today in Epic.     Objective:     /84   Pulse 70   Temp 36.6 °C (97.9 °F)   Resp 14   Ht 1.803 m (5' 11\")   Wt 64.9 kg (143 lb)   SpO2 99%     Physical Exam  Vitals and nursing note reviewed.   Constitutional:       General: He is not in acute distress.     Appearance: Normal appearance. He is not ill-appearing.   HENT:      Head: Normocephalic and atraumatic.      Right Ear: Tympanic membrane normal. Decreased hearing noted. Swelling and tenderness present. No drainage. Tympanic membrane is not perforated, erythematous or bulging.      Left Ear: Tympanic membrane normal.      Nose: Nose normal.      Mouth/Throat:      Mouth: Mucous membranes are " moist.      Pharynx: Oropharynx is clear.   Eyes:      Conjunctiva/sclera: Conjunctivae normal.      Pupils: Pupils are equal, round, and reactive to light.   Cardiovascular:      Rate and Rhythm: Normal rate.      Heart sounds: Normal heart sounds.   Pulmonary:      Effort: Pulmonary effort is normal.      Breath sounds: Normal breath sounds.   Abdominal:      General: Abdomen is flat.      Palpations: Abdomen is soft.   Skin:     General: Skin is warm and dry.      Capillary Refill: Capillary refill takes less than 2 seconds.   Neurological:      Mental Status: He is alert and oriented to person, place, and time.   Psychiatric:         Mood and Affect: Mood normal.         Behavior: Behavior normal.         Assessment/Plan:       1. Acute diffuse otitis externa of right ear  ciprofloxacin/dexamethasone (CIPRODEX) 0.3-0.1 % Suspension        After assessment patient does have noted otitis externa of right ear after he fell asleep with headphones on roughly 2 to 3 days ago.  Patient at this time does report that he has history of otitis externa and was provided Ciprodex drops in the past.  Ciprodex drops were sent to patient pharmacy at this time and he is instructed to use as prescribed.  Patient instructed to monitor for any worsening signs and symptoms of any other concerns he was instructed return to urgent care for reevaluation.    Differential diagnosis, natural history, and supportive care discussed. We also reviewed side effects of medication including allergic response, GI upset, tendon injury, rash, sedation etc. Patient and/or guardian voices understanding.      Advised the patient to follow-up with the primary care physician for recheck, reevaluation, and consideration of further management.    I personally reviewed prior external notes and test results pertinent to today's visit as well as additional imaging and testing completed in clinic today.     Please note that this dictation was created using  voice recognition software. I have made every reasonable attempt to correct obvious errors, but I expect that there are errors of grammar and possibly content that I did not discover before finalizing the note.    This note was electronically signed by EZE De La Torre

## 2025-05-23 ENCOUNTER — OFFICE VISIT (OUTPATIENT)
Dept: URGENT CARE | Facility: PHYSICIAN GROUP | Age: 80
End: 2025-05-23
Payer: COMMERCIAL

## 2025-05-23 VITALS
OXYGEN SATURATION: 98 % | DIASTOLIC BLOOD PRESSURE: 68 MMHG | RESPIRATION RATE: 18 BRPM | BODY MASS INDEX: 20.02 KG/M2 | SYSTOLIC BLOOD PRESSURE: 136 MMHG | WEIGHT: 143 LBS | HEIGHT: 71 IN | HEART RATE: 69 BPM | TEMPERATURE: 98 F

## 2025-05-23 DIAGNOSIS — H92.01 ACUTE OTALGIA, RIGHT: ICD-10-CM

## 2025-05-23 DIAGNOSIS — H61.21 IMPACTED CERUMEN OF RIGHT EAR: ICD-10-CM

## 2025-05-23 DIAGNOSIS — H60.311 ACUTE DIFFUSE OTITIS EXTERNA OF RIGHT EAR: ICD-10-CM

## 2025-05-23 PROCEDURE — 3075F SYST BP GE 130 - 139MM HG: CPT | Performed by: NURSE PRACTITIONER

## 2025-05-23 PROCEDURE — 99213 OFFICE O/P EST LOW 20 MIN: CPT | Performed by: NURSE PRACTITIONER

## 2025-05-23 PROCEDURE — 3078F DIAST BP <80 MM HG: CPT | Performed by: NURSE PRACTITIONER

## 2025-05-23 ASSESSMENT — ENCOUNTER SYMPTOMS: CONSTITUTIONAL NEGATIVE: 1

## 2025-05-23 ASSESSMENT — FIBROSIS 4 INDEX: FIB4 SCORE: 0.88

## 2025-05-23 ASSESSMENT — VISUAL ACUITY: OU: 1

## 2025-05-23 NOTE — PROGRESS NOTES
Subjective:     Florni Brooks is a 79 y.o. male who presents for Ear Pain (R ear pain, can't hear x 10 days)       Otalgia   There is pain in the right ear. This is a new problem. The problem has been gradually worsening. Associated symptoms include hearing loss.     Patient seen in urgent care 5/13/2025.  Was having chief concern of new onset right ear pain and decreased hearing.Was diagnosed with otitis externa of the right ear and started on ciprofloxacin drops.  Patient reports he has been taking these drops as directed.  However, symptoms have not improved.  Patient reports using over the ear headphones for his iPad and listens to music to help him relax.  Reports he is terminally ill and the right ear pain and decreased hearing compounded with other problems such as vision impairment is affecting his quality of life.    Review of Systems   Constitutional: Negative.    HENT:  Positive for ear pain and hearing loss.         Right   All other systems reviewed and are negative.    Refer to HPI for additional details.    During this visit, appropriate PPE was worn, and hand hygiene was performed.    PMH:  has a past medical history of COPD (chronic obstructive pulmonary disease) (HCC) (7/5/2013), ED (erectile dysfunction) (7/5/2013), Osteoarthritis (7/5/2013), Shingles, and Shortness of breath (7/5/2013).    MEDS:   Current Outpatient Medications:     lisinopril-hydrochlorothiazide (PRINZIDE) 10-12.5 MG per tablet, Take 1 Tablet by mouth every day., Disp: , Rfl:     ciprofloxacin/dexamethasone (CIPRODEX) 0.3-0.1 % Suspension, Apply 4 drops twice daily to the affected ear(s) x7 days, Disp: 7.5 mL, Rfl: 0    fluticasone-salmeterol (ADVAIR DISKUS) 250-50 MCG/ACT AEROSOL POWDER, BREATH ACTIVATED, Inhale 1 Puff every 12 hours., Disp: , Rfl:     triamcinolone acetonide (KENALOG) 0.1 % Ointment, Apply 1 g topically 2 times a day., Disp: 30 g, Rfl: 0    donepezil (ARICEPT) 5 MG Tab, Take 5 mg by mouth every  "evening., Disp: , Rfl:     vitamin D2, Ergocalciferol, (DRISDOL) 1.25 MG (62813 UT) Cap capsule, Take  by mouth every 7 days., Disp: , Rfl:     albuterol 108 (90 Base) MCG/ACT Aero Soln inhalation aerosol, Inhale 2 Puffs every 6 hours as needed for Shortness of Breath., Disp: 8.5 g, Rfl: 0    carbidopa-levodopa (SINEMET)  MG Tab, Take 1 Tablet by mouth 3 times a day., Disp: , Rfl:     ALLERGIES: No Known Allergies  SURGHX:   Past Surgical History:   Procedure Laterality Date    COLON RESECTION      EYE SURGERY      muscle surgery    FOOT SURGERY      neuroma/ganglion cyst    HERNIA REPAIR      KNEE ARTHROSCOPY      bilaterally     SOCHX:  reports that he quit smoking about 25 years ago. His smoking use included cigarettes. He has never used smokeless tobacco. He reports that he does not currently use drugs after having used the following drugs: Marijuana and Oral. He reports that he does not drink alcohol.    FH: Per HPI as applicable/pertinent.      Objective:     /68   Pulse 69   Temp 36.7 °C (98 °F)   Resp 18   Ht 1.803 m (5' 11\")   Wt 64.9 kg (143 lb)   SpO2 98%   BMI 19.94 kg/m²     Physical Exam  Nursing note reviewed.   Constitutional:       General: He is not in acute distress.     Appearance: He is well-developed. He is not ill-appearing or toxic-appearing.   HENT:      Right Ear: There is impacted cerumen.      Left Ear: Tympanic membrane, ear canal and external ear normal.   Eyes:      General: Vision grossly intact.   Cardiovascular:      Rate and Rhythm: Normal rate.   Pulmonary:      Effort: Pulmonary effort is normal. No respiratory distress.   Musculoskeletal:         General: No deformity. Normal range of motion.   Skin:     General: Skin is warm and dry.      Coloration: Skin is not pale.   Neurological:      Mental Status: He is alert and oriented to person, place, and time.      Motor: No weakness.   Psychiatric:         Behavior: Behavior normal. Behavior is cooperative. "       Assessment/Plan:     1. Acute otalgia, right    2. Impacted cerumen of right ear    3. Acute diffuse otitis externa of right ear    Ear lavage was performed in right external auditory canal with large amount of cerumen removed by MA. Patient tolerated well. No complications. Re-examination reveals right external auditory canal clear of cerumen. TM intact with no erythema, bulging, or perforation. Patient reports hearing has improved in right ear.    EAC intact. It appears the AOE has resolved. May stop drops now.    Monitor. Warning signs reviewed. Return precautions advised.     Differential diagnosis, natural history, supportive care, over-the-counter symptom management per 's instructions, close monitoring, and indications for immediate follow-up discussed.     All questions answered. Patient agrees with the plan of care.

## 2025-06-03 ENCOUNTER — APPOINTMENT (OUTPATIENT)
Dept: MEDICAL GROUP | Facility: PHYSICIAN GROUP | Age: 80
End: 2025-06-03
Payer: COMMERCIAL

## 2025-06-03 VITALS
OXYGEN SATURATION: 97 % | SYSTOLIC BLOOD PRESSURE: 128 MMHG | HEART RATE: 60 BPM | BODY MASS INDEX: 20.02 KG/M2 | RESPIRATION RATE: 16 BRPM | HEIGHT: 71 IN | DIASTOLIC BLOOD PRESSURE: 82 MMHG | TEMPERATURE: 98.1 F | WEIGHT: 143 LBS

## 2025-06-03 DIAGNOSIS — G20.A1 PARKINSON'S DISEASE, UNSPECIFIED WHETHER DYSKINESIA PRESENT, UNSPECIFIED WHETHER MANIFESTATIONS FLUCTUATE (HCC): ICD-10-CM

## 2025-06-03 DIAGNOSIS — E55.9 VITAMIN D DEFICIENCY: Chronic | ICD-10-CM

## 2025-06-03 DIAGNOSIS — J44.9 CHRONIC OBSTRUCTIVE PULMONARY DISEASE, UNSPECIFIED COPD TYPE (HCC): Chronic | ICD-10-CM

## 2025-06-03 DIAGNOSIS — R41.89 COGNITIVE IMPAIRMENT: Chronic | ICD-10-CM

## 2025-06-03 DIAGNOSIS — K86.89 PANCREATIC DUCT DILATED: ICD-10-CM

## 2025-06-03 DIAGNOSIS — R73.9 HYPERGLYCEMIA: ICD-10-CM

## 2025-06-03 DIAGNOSIS — J84.9 INTERSTITIAL LUNG DISEASE (HCC): Chronic | ICD-10-CM

## 2025-06-03 DIAGNOSIS — K80.20 CALCULUS OF GALLBLADDER WITHOUT CHOLECYSTITIS WITHOUT OBSTRUCTION: Chronic | ICD-10-CM

## 2025-06-03 DIAGNOSIS — R63.4 ABNORMAL WEIGHT LOSS: Primary | ICD-10-CM

## 2025-06-03 DIAGNOSIS — L30.9 ECZEMA, UNSPECIFIED TYPE: Chronic | ICD-10-CM

## 2025-06-03 DIAGNOSIS — E78.5 DYSLIPIDEMIA: Chronic | ICD-10-CM

## 2025-06-03 DIAGNOSIS — H92.01 RIGHT EAR PAIN: ICD-10-CM

## 2025-06-03 DIAGNOSIS — I10 ESSENTIAL HYPERTENSION: Chronic | ICD-10-CM

## 2025-06-03 PROBLEM — H92.02 LEFT EAR PAIN: Status: ACTIVE | Noted: 2025-06-03

## 2025-06-03 PROCEDURE — G2212 PROLONG OUTPT/OFFICE VIS: HCPCS | Performed by: INTERNAL MEDICINE

## 2025-06-03 PROCEDURE — 99215 OFFICE O/P EST HI 40 MIN: CPT | Performed by: INTERNAL MEDICINE

## 2025-06-03 PROCEDURE — 3079F DIAST BP 80-89 MM HG: CPT | Performed by: INTERNAL MEDICINE

## 2025-06-03 PROCEDURE — 3074F SYST BP LT 130 MM HG: CPT | Performed by: INTERNAL MEDICINE

## 2025-06-03 RX ORDER — DONEPEZIL HYDROCHLORIDE 10 MG/1
10 TABLET, FILM COATED ORAL DAILY
COMMUNITY
Start: 2025-04-10

## 2025-06-03 RX ORDER — LISINOPRIL AND HYDROCHLOROTHIAZIDE 10; 12.5 MG/1; MG/1
1 TABLET ORAL DAILY
Qty: 100 TABLET | Refills: 3 | Status: SHIPPED | OUTPATIENT
Start: 2025-06-03

## 2025-06-03 RX ORDER — POTASSIUM CHLORIDE 1500 MG/1
20 TABLET, EXTENDED RELEASE ORAL DAILY
COMMUNITY

## 2025-06-03 ASSESSMENT — FIBROSIS 4 INDEX: FIB4 SCORE: 0.88

## 2025-06-04 NOTE — Clinical Note
REFERRAL APPROVAL NOTICE         Sent on June 4, 2025                   Florin Brooks  [address]   [address]  [facility] [address]                   Dear Mr. Brooks,    After a careful review of the medical information and benefit coverage, [facility] has processed your referral. See below for additional details.    If applicable, you must be actively enrolled with your insurance for coverage of the authorized service. If you have any questions regarding your coverage, please contact your insurance directly.    REFERRAL INFORMATION   Referral #:  [id number]  Referred-To Department    Referred-By Provider:  Janice Sepulveda M.D.   [facility]      [address]  [place] [address]  [phone] [address] [facility] [id number] [phone]    Referral Start Date:  06/03/2025  Referral End Date:   06/03/2026             SCHEDULING  If you do not already have an appointment, please call [phone] to make an appointment.     MORE INFORMATION  If you do not already have a [employer] account, sign up at: [email or web address]  You can access your medical information, make appointments, see lab results, billing information, and more.  If you have questions regarding this referral, please contact  the [facility] Referrals department at:             [phone]. Monday - Friday 8:00AM - 5:00PM.     Sincerely,    [facility]

## 2025-06-04 NOTE — Clinical Note
REFERRAL APPROVAL NOTICE         Sent on June 4, 2025                   Florin Brooks  [address]   [address]  [place] [address]                   Dear Mr. Brooks,    After a careful review of the medical information and benefit coverage, [facility] has processed your referral. See below for additional details.    If applicable, you must be actively enrolled with your insurance for coverage of the authorized service. If you have any questions regarding your coverage, please contact your insurance directly.    REFERRAL INFORMATION   Referral #:  [id number]  Referred-To Provider    Referred-By Provider:  Otolaryngology    ADALBERTO Schaeffer STACEY      [address]  [place] [address]  [phone] [address]  [place] [address]  [phone]    Referral Start Date:  06/03/2025  Referral End Date:   06/03/2026             SCHEDULING  If you do not already have an appointment, please call [phone] to make an appointment.     MORE INFORMATION  If you do not already have a [employer] account, sign up at: [email or web address]  You can access your medical information, make appointments, see lab results, billing information, and more.  If you have questions regarding this referral, please contact  the [facility] Referrals department at:             [phone]. Monday - Friday 8:00AM - 5:00PM.     Sincerely,    [facility]

## 2025-06-04 NOTE — ASSESSMENT & PLAN NOTE
Chronic condition.  The patient currently taking lisinopril hydrochlorothiazide.  Blood pressure has been well-controlled.  The patient denies chest pain shortness of breath or palpitation.  He is requesting Rx refills.

## 2025-06-04 NOTE — PROGRESS NOTES
PRIMARY CARE CLINIC VISIT        Chief Complaint   Patient presents with    Weight Loss    Ear Pain     right      Follow-up weight loss condition  Ear pain   Pancreatic duct dilation  COPD  Interstitial lung disease  Gallstone  Parkinson disease  Follow-up hypertension  Hyperlipidemia  Cognitive impairment  Vitamin D deficiency  Eczema          History of Present Illness     Abnormal weight loss  Patient was seen in October 2024.  Patient reported 10 pound weight loss intentionally in 6 months.  The patient denies significant change in his appetite.      The patient denies fever chills or night sweats.  He denies abdominal pain change in bowel movement or GI bleeding.  He denies shortness of breath or wheezing.  Pt denies changes in appetite, nausea, vomiting, abd pain, changes in bm , or Gi bleeding. Denies dysphagia or chewing problems.     ROS:  Constitutional:  no fever / chills   Neurologic: no headaches  ENT: no sore throat, no hearing loss  CV:  no chest pain, no palpitations  Pulmonary: no SOB, no cough ,    :  no dysuria, no hematuria   Skin: no rash   Hematologic: no bleeding  Psych: pt denies depression, anxiety       CT scan 1.17.2025  of the chest abdomen pelvis completed showed    1. 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 No fibrosis evident.     2. Mild mediastinal adenopathy which is nonspecific. Trace pleural effusions. No evidence for tumor otherwise.     3. Mild emphysema.     4. Large gallstone without inflammation. Mild bile duct and pancreatic duct prominence could be chronic. Correlate with liver function tests.     5. Right inguinal surgical clips. Enlarged prostate. Bladder mostly empty.    Pt was treated with Augmentin and Doxycycline and pt was referred to PULmonary specialist.    The patient stated that since October 2024 to now his weight  has remained stable.  Patient stated that he did not lose any more weight since that time.      COPD (chronic obstructive pulmonary disease) (HCC)  Chronic condition.  Patient was seen by pulmonology service previously.  Patient currently using Advair twice daily and albuterol as needed.  Denies shortness of breath or wheezing.    Cholelithiasis  Chronic condition.  After CT scan done the patient was referred and seen by general surgery.  The patient reported that surgery not recommended for the gallstone as the patient is asymptomatic.    Interstitial lung disease (HCC)  Chronic condition.  The patient was seen by pulmonology and he will have an appointment to see the pulmonologist in [place] in July 2025.  Currently the patient denies shortness of breath or wheezing.  He denies significant cough.    Pancreatic duct dilated  Chronic condition.  CT scan result reviewed.  Patient denies significant abdominal pain change in bowel movement no GI bleeding.  Cause for this condition is unclear.  I would like to refer the patient to GI for further evaluation.    Parkinson disease (HCC)  Chronic condition but the patient has been followed by neurology.  Patient presently taking Sinemet.  Patient reports that his symptoms are fairly stable.  Tolerating medication well.    Cognitive impairment  Chronic condition.  Patient followed by neurology service.  Patient is presently taking benazepril.  Patient stated that he tolerating medication well    Essential hypertension  Chronic condition.  The patient currently taking lisinopril hydrochlorothiazide.  Blood pressure has been well-controlled.  The patient denies chest pain shortness of breath or palpitation.  He is requesting Rx refills.    Dyslipidemia  Chronic condition.  The patient currently on diet therapy.  Patient is due to repeat the lab test.    Vitamin D deficiency  Chronic condition.  Patient presently taking vitamin D supplementation.  Patient is due for lab  test.    Eczema  Chronic condition.  The patient is using Kenalog ointment twice daily as needed.  No new symptoms reported.    Right ear pain   new condition.  The patient reported intermittent ear pain/discomfort since the last few days.  Patient denies fever or chills.  He denies tinnitus.  Denies trauma or injury.  The patient asymptomatic at the present time.    Medications Ordered Prior to Encounter[1]     Allergies: Flagyl [metronidazole]    Current Medications and Prescriptions Ordered in Epic[2]    Past Medical History[3]    Past Surgical History[4]    Family History   Problem Relation Age of Onset    Alcohol/Drug Father     Lung Disease Sister     Cancer Neg Hx     Diabetes Neg Hx     Stroke Neg Hx        Tobacco Use History[5]    Social History     Substance and Sexual Activity   Alcohol Use No    Alcohol/week: 0.0 oz       Review of systems  As per HPI above. All other systems reviewed and negative.      Past Medical, Social, and Family history reviewed and updated in [facility]       LAB DATA:     I have independently reviewed / interpreted labs    Lab Results   Component Value Date/Time    HBA1C 5.5 10/08/2024 06:58 AM    HBA1C 5.5 03/22/2024 06:45 AM    HBA1C 5.2 12/12/2022 07:31 AM        Lab Results   Component Value Date/Time    WBC 8.1 10/22/2024 07:37 AM    HEMOGLOBIN 13.5 (L) 10/22/2024 07:37 AM    HEMATOCRIT 42.8 10/22/2024 07:37 AM    MCV 91.8 10/22/2024 07:37 AM    PLATELETCT 398 10/22/2024 07:37 AM       Lab Results   Component Value Date/Time    SODIUM 142 10/08/2024 06:58 AM    POTASSIUM 3.7 12/30/2024 07:48 AM    GLUCOSE 91 10/08/2024 06:58 AM    BUN 13 01/13/2025 08:33 AM    CREATININE 0.93 01/13/2025 08:33 AM       Lab Results   Component Value Date/Time    CHOLSTRLTOT 149 10/08/2024 06:58 AM    TRIGLYCERIDE 89 10/08/2024 06:58 AM    HDL 41 10/08/2024 06:58 AM    LDL 90 10/08/2024 06:58 AM       Lab Results   Component Value Date/Time    ALTSGPT 13 10/22/2024 07:37 AM          Objective  "    /82 (BP Location: Left arm, Patient Position: Sitting, BP Cuff Size: Adult)   Pulse 60   Temp 36.7 °C (98.1 °F) (Temporal)   Resp 16   Ht 1.803 m (5' 11\")   Wt 64.9 kg (143 lb)   SpO2 97%    Body mass index is 19.94 kg/m².    General: alert in no apparent distress.  Cardiovascular: regular rate and rhythm  Pulmonary: lungs : no wheezing   Gastrointestinal: BS present.  No rebound guarding or rigidity noted.  No obvious masses palpable  R ear canal clear tympanic membrane intact no acute abnormality noted    Assessment and Plan     1. Abnormal weight loss  Chronic condition.  Exact cause unclear.  Patient reported initial 10 pound weight loss in October 2024.  Ever since that time to now the patient stated that his weight has remained stable.  Previous CT scan of the chest abdomen pelvis discussed with the patient.    Recommendation:  Refer the patient to GI for consultation  Referred to dietitian  Patient to try Ensure 1 can 3 times a day  Also lab tests requested  - Basic Metabolic Panel;  A1c  - CBC WITH DIFFERENTIAL; Future  - HEPATIC FUNCTION PANEL; Future  - TSH WITH REFLEX TO FT4; Future  - URINALYSIS; Future  - HIV AG/AB COMBO ASSAY SCREENING; Future  - HEP C VIRUS ANTIBODY; Future  - Quantiferon Gold Plus TB (4 tube); Future  -SED, crp  -Occult blood stool    2. Pancreatic duct dilated  Chronic condition.  Noted by recent CT scan.  Patient asymptomatic.  Cause unclear  - Referral to Gastroenterology    3. Chronic obstructive pulmonary disease, unspecified COPD type (HCC)  Chronic stable.  Continue Advair 1 puff twice daily.  Patient may use albuterol as needed for rescue treatment.  Follow-up with pulmonology service    4. Interstitial lung disease (HCC)  Chronic condition.  Continue albuterol as needed.  Advised the patient to keep appointment for follow-up with pulmonology service    5.  Gallstone  Chronic condition.  The patient currently asymptomatic.  Patient reported that he was seen by " general surgery and surgery not recommended.  - Referral to Gastroenterology    6. Parkinson's disease  Chronic condition.  Stable.  Continue Sinemet 1 tablet 3 times a day.  Patient to follow-up with neurology        7. Essential hypertension  Chronic stable.  BP normal today.  Continue lisinopril 10 hydrochlorothiazide 12.5 Mg daily    8. Dyslipidemia  - Lipid Profile; Future  Condition.  Current status unclear.  Lab test ordered to check lipid panel.  Continue low-fat low-cholesterol diet    9. Cognitive impairment  Chronic stable.  Continue Aricept 10 mg daily.  Follow-up with neurology as directed    10. Vitamin D deficiency  Chronic stable.  Continue vitamin D supplementation 50 unit once a week.  Lab test requested to check vitamin D level    11.  Right ear pain.  This is a new condition.  Exam today however unremarkable.  The cause unclear.  Recommendation I have referred the patient to ENT for further evaluation.    12..  Chronic stable condition.  The patient asymptomatic.  The patient may use Kenalog 0.1% ointment twice daily as needed.        Time spent:   76  minutes     Reviewed medical records including:  May 23, 2025 medical office note   May 13, 2025 urgent care note  October 15, 2024 medical office note  September 20, 2024 urgent care note  August 27, 2024 urgent care note  May 20, 2024 urgent care note  Laboratory data January 13, 2025, December 30, 2024, October 22, 2024, October 8, 2024  Radiology report January 17, 2025  Total time includes face to face time and non-face to face time.  Chart review before the visit, the actual patient visit, and time spent on documentation in the EMR after the visit.  Chart review/prep, review of other providers' records, Independent review of imagings/labs ,  time for history/examination , pt's counseling/education, ordering, prescribing, treatment plan discussed with patient, and care coordination.            Please note that this dictation was created using  voice recognition software. I have made every reasonable attempt to correct obvious errors, but I expect that there are errors of grammar and possibly content that I did not discover before finalizing the note.    Judah Sepulveda MD  Internal Medicine  [facility]         [1]   Current Outpatient Medications on File Prior to Visit   Medication Sig Dispense Refill    donepezil (ARICEPT) 10 MG tablet Take 10 mg by mouth every day.      fluticasone-salmeterol (ADVAIR DISKUS) 250-50 MCG/ACT AEROSOL POWDER, BREATH ACTIVATED Inhale 1 Puff every 12 hours.      triamcinolone acetonide (KENALOG) 0.1 % Ointment Apply 1 g topically 2 times a day. 30 g 0    vitamin D2, Ergocalciferol, (DRISDOL) 1.25 MG (08732 UT) Cap capsule Take  by mouth every 7 days.      albuterol 108 (90 Base) MCG/ACT Aero Soln inhalation aerosol Inhale 2 Puffs every 6 hours as needed for Shortness of Breath. 8.5 g 0    carbidopa-levodopa (SINEMET)  MG Tab Take 1 Tablet by mouth 3 times a day.      potassium chloride SA (KDUR) 20 MEQ Tab CR Take 20 mEq by mouth every day. (Patient not taking: Reported on 6/3/2025)       No current facility-administered medications on file prior to visit.   [2]   Current Outpatient Medications Ordered in Epic   Medication Sig Dispense Refill    donepezil (ARICEPT) 10 MG tablet Take 10 mg by mouth every day.      lisinopril-hydrochlorothiazide (PRINZIDE) 10-12.5 MG per tablet Take 1 Tablet by mouth every day. 100 Tablet 3    fluticasone-salmeterol (ADVAIR DISKUS) 250-50 MCG/ACT AEROSOL POWDER, BREATH ACTIVATED Inhale 1 Puff every 12 hours.      triamcinolone acetonide (KENALOG) 0.1 % Ointment Apply 1 g topically 2 times a day. 30 g 0    vitamin D2, Ergocalciferol, (DRISDOL) 1.25 MG (76706 UT) Cap capsule Take  by mouth every 7 days.      albuterol 108 (90 Base) MCG/ACT Aero Soln inhalation aerosol Inhale 2 Puffs every 6 hours as needed for Shortness of Breath. 8.5 g 0    carbidopa-levodopa (SINEMET)   MG Tab Take 1 Tablet by mouth 3 times a day.      potassium chloride SA (KDUR) 20 MEQ Tab CR Take 20 mEq by mouth every day. (Patient not taking: Reported on 6/3/2025)       No current [facility]-ordered facility-administered medications on file.   [3]   Past Medical History:  Diagnosis Date    COPD (chronic obstructive pulmonary disease) (HCC) 2013    ED (erectile dysfunction) 2013    Osteoarthritis 2013    Shingles     Shortness of breath 2013   [4]   Past Surgical History:  Procedure Laterality Date    COLON RESECTION      EYE SURGERY      muscle surgery    FOOT SURGERY      neuroma/ganglion cyst    HERNIA REPAIR      KNEE ARTHROSCOPY      bilaterally   [5]   Social History  Tobacco Use   Smoking Status Former    Current packs/day: 0.00    Types: Cigarettes    Quit date: 1999    Years since quittin.9   Smokeless Tobacco Never

## 2025-06-04 NOTE — ASSESSMENT & PLAN NOTE
Chronic condition.  Patient presently taking vitamin D supplementation.  Patient is due for lab test.

## 2025-06-04 NOTE — ASSESSMENT & PLAN NOTE
Chronic condition.  The patient currently on diet therapy.  Patient is due to repeat the lab test.

## 2025-06-04 NOTE — ASSESSMENT & PLAN NOTE
Chronic condition.  Patient was seen by pulmonology service previously.  Patient currently using Advair twice daily and albuterol as needed.  Denies shortness of breath or wheezing.

## 2025-06-04 NOTE — ASSESSMENT & PLAN NOTE
new condition.  The patient reported recurrent left ear pain/discomfort since the last few days.  Patient denies fever or chills.  He denied tinnitus.  Denies trauma or injury.  The patient asymptomatic at the present time.

## 2025-06-04 NOTE — ASSESSMENT & PLAN NOTE
Chronic condition.  After CT scan done the patient was referred and seen by general surgery.  The patient reported that surgery not recommended for the gallstone as the patient is asymptomatic.

## 2025-06-04 NOTE — ASSESSMENT & PLAN NOTE
Chronic condition.  The patient is using Kenalog ointment twice daily as needed.  No new symptoms reported.

## 2025-06-04 NOTE — ASSESSMENT & PLAN NOTE
Chronic condition.  The patient was seen by pulmonology and he will have an appointment to see the pulmonologist in [place] in July 2025.  Currently the patient denies shortness of breath or wheezing.  He denies significant cough.

## 2025-06-04 NOTE — ASSESSMENT & PLAN NOTE
Chronic condition but the patient has been followed by neurology.  Patient presently taking Sinemet.  Patient reports that his symptoms are fairly stable.  Tolerating medication well.

## 2025-06-04 NOTE — ASSESSMENT & PLAN NOTE
Chronic condition.  CT scan result reviewed.  Patient denies significant abdominal pain change in bowel movement no GI bleeding.  Cause for this condition is unclear.  I would like to refer the patient to GI for further evaluation.

## 2025-06-04 NOTE — Clinical Note
REFERRAL APPROVAL NOTICE         Sent on June 4, 2025                   Florin Brooks  [address]   [address]  [place] [address]                   Dear Mr. Brooks,    After a careful review of the medical information and benefit coverage, [facility] has processed your referral. See below for additional details.    If applicable, you must be actively enrolled with your insurance for coverage of the authorized service. If you have any questions regarding your coverage, please contact your insurance directly.    REFERRAL INFORMATION   Referral #:  [id number]  Referred-To Provider    Referred-By Provider:  Gastroenterology    Judah Sepulveda M.D.   [facility]      [address]  [place] [address]  [phone] [address] [place] [id number] [phone]    Referral Start Date:  06/03/2025  Referral End Date:   06/03/2026             SCHEDULING  If you do not already have an appointment, please call [phone] to make an appointment.     MORE INFORMATION  If you do not already have a [employer] account, sign up at: [email or web address]  You can access your medical information, make appointments, see lab results, billing information, and more.  If you have questions regarding this referral, please contact  the [facility] Referrals department at:             [phone]. Monday - Friday 8:00AM - 5:00PM.     Sincerely,    [facility]

## 2025-06-04 NOTE — ASSESSMENT & PLAN NOTE
Chronic condition.  Patient followed by neurology service.  Patient is presently taking benazepril.  Patient stated that he tolerating medication well

## 2025-06-04 NOTE — ASSESSMENT & PLAN NOTE
Patient was seen in October 2024.  Patient reported 10 pound weight loss intentionally in 6 months.  The patient denies significant change in his appetite.  The patient denies fever chills or night sweats.  He denies abdominal pain change in bowel movement or GI bleeding.  He denies shortness of breath or wheezing.    CT scan of the chest abdomen pelvis completed showed    1. 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 No fibrosis evident.     2. Mild mediastinal adenopathy which is nonspecific. Trace pleural effusions. No evidence for tumor otherwise.     3. Mild emphysema.     4. Large gallstone without inflammation. Mild bile duct and pancreatic duct prominence could be chronic. Correlate with liver function tests.     5. Right inguinal surgical clips. Enlarged prostate. Bladder mostly empty.    The patient stated that since October 2024 to now his weight has remained stable.  Patient stated that he did not lose any more weight since that time.  The patient has not been able to gain any weight.

## 2025-06-05 ENCOUNTER — OFFICE VISIT (OUTPATIENT)
Dept: URGENT CARE | Facility: PHYSICIAN GROUP | Age: 80
End: 2025-06-05
Payer: COMMERCIAL

## 2025-06-05 VITALS
HEART RATE: 68 BPM | BODY MASS INDEX: 20.19 KG/M2 | WEIGHT: 141 LBS | TEMPERATURE: 97.7 F | HEIGHT: 70 IN | OXYGEN SATURATION: 95 % | RESPIRATION RATE: 14 BRPM | SYSTOLIC BLOOD PRESSURE: 126 MMHG | DIASTOLIC BLOOD PRESSURE: 68 MMHG

## 2025-06-05 DIAGNOSIS — H65.111 NON-RECURRENT ACUTE ALLERGIC OTITIS MEDIA OF RIGHT EAR: Primary | ICD-10-CM

## 2025-06-05 DIAGNOSIS — L29.9 PRURITIC CONDITION: ICD-10-CM

## 2025-06-05 DIAGNOSIS — J30.1 SEASONAL ALLERGIC RHINITIS DUE TO POLLEN: ICD-10-CM

## 2025-06-05 PROCEDURE — 99214 OFFICE O/P EST MOD 30 MIN: CPT | Performed by: FAMILY MEDICINE

## 2025-06-05 PROCEDURE — 3074F SYST BP LT 130 MM HG: CPT | Performed by: FAMILY MEDICINE

## 2025-06-05 PROCEDURE — 3078F DIAST BP <80 MM HG: CPT | Performed by: FAMILY MEDICINE

## 2025-06-05 RX ORDER — AZITHROMYCIN 250 MG/1
TABLET, FILM COATED ORAL
Qty: 6 TABLET | Refills: 0 | Status: SHIPPED | OUTPATIENT
Start: 2025-06-05

## 2025-06-05 ASSESSMENT — FIBROSIS 4 INDEX: FIB4 SCORE: 0.88

## 2025-06-05 NOTE — PROGRESS NOTES
"Subjective:   Florin Brooks is a 79 y.o. male who presents for Otalgia (Pt was here 2 weeks ago for ear lavage, but after a day after, right ear pain is happening, feeling like its plugged again.)  Patient presents with having some recurring ear pain last couple days on the right side feels almost had a stabbing pain.  He says he had ear lavage here 2 weeks ago with some improvement but did not last very long.  Of note he does have significantly narrow ear canals which is part of the problem is also complaining of intermittent itchiness and pruritus with no specific rash as well as runny nose and occasional cough        ROS    Medications, Allergies, and current problem list reviewed today in Epic.     Objective:     /68 (BP Location: Right arm, Patient Position: Sitting, BP Cuff Size: Adult)   Pulse 68   Temp 36.5 °C (97.7 °F)   Resp 14   Ht 1.778 m (5' 10\")   Wt 64 kg (141 lb)   SpO2 95%     Physical Exam  Constitutional:       Appearance: Normal appearance.   HENT:      Head: Normocephalic and atraumatic.      Ears:      Comments: Fluid behind tympanic membranes bilaterally right ear is actually red with less light reflex.  Of note extremely small ear canals does have a little bit of wax on the right side as well.  But due to the infection we will leave that alone     Nose: Rhinorrhea present.      Mouth/Throat:      Pharynx: Posterior oropharyngeal erythema present.   Eyes:      Extraocular Movements: Extraocular movements intact.      Pupils: Pupils are equal, round, and reactive to light.   Cardiovascular:      Rate and Rhythm: Normal rate and regular rhythm.   Pulmonary:      Effort: Pulmonary effort is normal.      Breath sounds: Normal breath sounds.   Skin:     General: Skin is warm and dry.      Comments: No notable rashes are seen   Neurological:      General: No focal deficit present.      Mental Status: He is alert and oriented to person, place, and time.   Psychiatric:         Mood " and Affect: Mood normal.         Behavior: Behavior normal.         Assessment/Plan:     Assessment & Plan  Non-recurrent acute allergic otitis media of right ear  This point appears to have a mild otitis media, put him in a Z-Carter as directed eat with it and yogurt.  He does also have a little bit of earwax but due to the infection we will not treat that at this point.  Discussed possibly doing vegetable oil once or twice a week in the shower room temperature to help minimize the buildup  Orders:    azithromycin (ZITHROMAX) 250 MG Tab; Take 2 tabs today, then 1 tab p.o. daily    Seasonal allergic rhinitis due to pollen  Commended to resume like Zyrtec daily for least couple weeks to try and help minimize his symptoms       Pruritic condition  Zyrtec should assist this as well questions were answered follow-up as needed           Differential diagnosis, natural history, supportive care, and indications for immediate follow-up discussed.    Advised the patient to follow-up with the primary care physician for recheck, reevaluation, and consideration of further management.    Please note that this dictation was created using voice recognition software. I have made a reasonable attempt to correct obvious errors, but I expect that there are errors of grammar and possibly content that I did not discover before finalizing the note.    This note was electronically signed by Nikko WHITEHEAD M.D.

## 2025-06-30 ENCOUNTER — HOSPITAL ENCOUNTER (OUTPATIENT)
Dept: LAB | Facility: MEDICAL CENTER | Age: 80
End: 2025-06-30
Attending: INTERNAL MEDICINE
Payer: COMMERCIAL

## 2025-06-30 DIAGNOSIS — E55.9 VITAMIN D DEFICIENCY: Chronic | ICD-10-CM

## 2025-06-30 DIAGNOSIS — R73.9 HYPERGLYCEMIA: ICD-10-CM

## 2025-06-30 DIAGNOSIS — R63.4 ABNORMAL WEIGHT LOSS: ICD-10-CM

## 2025-06-30 LAB
25(OH)D3 SERPL-MCNC: 29 NG/ML (ref 30–100)
ALBUMIN SERPL BCP-MCNC: 3.8 G/DL (ref 3.2–4.9)
ALP SERPL-CCNC: 66 U/L (ref 30–99)
ALT SERPL-CCNC: 18 U/L (ref 2–50)
ANION GAP SERPL CALC-SCNC: 9 MMOL/L (ref 7–16)
APPEARANCE UR: CLEAR
AST SERPL-CCNC: 24 U/L (ref 12–45)
BASOPHILS # BLD AUTO: 0.6 % (ref 0–1.8)
BASOPHILS # BLD: 0.05 K/UL (ref 0–0.12)
BILIRUB CONJ SERPL-MCNC: 0.7 MG/DL (ref 0.1–0.5)
BILIRUB INDIRECT SERPL-MCNC: 1.2 MG/DL (ref 0–1)
BILIRUB SERPL-MCNC: 1.9 MG/DL (ref 0.1–1.5)
BILIRUB UR QL STRIP.AUTO: NEGATIVE
BUN SERPL-MCNC: 13 MG/DL (ref 8–22)
CALCIUM SERPL-MCNC: 8.7 MG/DL (ref 8.5–10.5)
CHLORIDE SERPL-SCNC: 106 MMOL/L (ref 96–112)
CO2 SERPL-SCNC: 28 MMOL/L (ref 20–33)
COLOR UR: ABNORMAL
CREAT SERPL-MCNC: 0.85 MG/DL (ref 0.5–1.4)
CRP SERPL HS-MCNC: <0.3 MG/DL (ref 0–0.75)
EOSINOPHIL # BLD AUTO: 0.16 K/UL (ref 0–0.51)
EOSINOPHIL NFR BLD: 1.9 % (ref 0–6.9)
ERYTHROCYTE [DISTWIDTH] IN BLOOD BY AUTOMATED COUNT: 50.2 FL (ref 35.9–50)
ERYTHROCYTE [SEDIMENTATION RATE] IN BLOOD BY WESTERGREN METHOD: 9 MM/HOUR (ref 0–20)
GFR SERPLBLD CREATININE-BSD FMLA CKD-EPI: 88 ML/MIN/1.73 M 2
GLUCOSE SERPL-MCNC: 90 MG/DL (ref 65–99)
GLUCOSE UR STRIP.AUTO-MCNC: NEGATIVE MG/DL
HCT VFR BLD AUTO: 42.1 % (ref 42–52)
HCV AB SER QL: REACTIVE
HGB BLD-MCNC: 13.7 G/DL (ref 14–18)
HIV 1+2 AB+HIV1 P24 AG SERPL QL IA: NORMAL
IMM GRANULOCYTES # BLD AUTO: 0.03 K/UL (ref 0–0.11)
IMM GRANULOCYTES NFR BLD AUTO: 0.4 % (ref 0–0.9)
KETONES UR STRIP.AUTO-MCNC: ABNORMAL MG/DL
LEUKOCYTE ESTERASE UR QL STRIP.AUTO: NEGATIVE
LYMPHOCYTES # BLD AUTO: 1.82 K/UL (ref 1–4.8)
LYMPHOCYTES NFR BLD: 21.3 % (ref 22–41)
MCH RBC QN AUTO: 30.4 PG (ref 27–33)
MCHC RBC AUTO-ENTMCNC: 32.5 G/DL (ref 32.3–36.5)
MCV RBC AUTO: 93.3 FL (ref 81.4–97.8)
MICRO URNS: ABNORMAL
MONOCYTES # BLD AUTO: 0.53 K/UL (ref 0–0.85)
MONOCYTES NFR BLD AUTO: 6.2 % (ref 0–13.4)
NEUTROPHILS # BLD AUTO: 5.94 K/UL (ref 1.82–7.42)
NEUTROPHILS NFR BLD: 69.6 % (ref 44–72)
NITRITE UR QL STRIP.AUTO: NEGATIVE
NRBC # BLD AUTO: 0 K/UL
NRBC BLD-RTO: 0 /100 WBC (ref 0–0.2)
PH UR STRIP.AUTO: 7.5 [PH] (ref 5–8)
PLATELET # BLD AUTO: 268 K/UL (ref 164–446)
PMV BLD AUTO: 9.7 FL (ref 9–12.9)
POTASSIUM SERPL-SCNC: 3.4 MMOL/L (ref 3.6–5.5)
PROT SERPL-MCNC: 6.5 G/DL (ref 6–8.2)
PROT UR QL STRIP: NEGATIVE MG/DL
RBC # BLD AUTO: 4.51 M/UL (ref 4.7–6.1)
RBC UR QL AUTO: NEGATIVE
SODIUM SERPL-SCNC: 143 MMOL/L (ref 135–145)
SP GR UR STRIP.AUTO: 1.02
TSH SERPL DL<=0.005 MIU/L-ACNC: 1.12 UIU/ML (ref 0.38–5.33)
UROBILINOGEN UR STRIP.AUTO-MCNC: 2 EU/DL
WBC # BLD AUTO: 8.5 K/UL (ref 4.8–10.8)

## 2025-06-30 PROCEDURE — 85652 RBC SED RATE AUTOMATED: CPT

## 2025-06-30 PROCEDURE — 86480 TB TEST CELL IMMUN MEASURE: CPT

## 2025-06-30 PROCEDURE — G0475 HIV COMBINATION ASSAY: HCPCS

## 2025-06-30 PROCEDURE — 80076 HEPATIC FUNCTION PANEL: CPT

## 2025-06-30 PROCEDURE — 81003 URINALYSIS AUTO W/O SCOPE: CPT

## 2025-06-30 PROCEDURE — 86140 C-REACTIVE PROTEIN: CPT

## 2025-06-30 PROCEDURE — 36415 COLL VENOUS BLD VENIPUNCTURE: CPT

## 2025-06-30 PROCEDURE — 82306 VITAMIN D 25 HYDROXY: CPT

## 2025-06-30 PROCEDURE — 84443 ASSAY THYROID STIM HORMONE: CPT

## 2025-06-30 PROCEDURE — 80048 BASIC METABOLIC PNL TOTAL CA: CPT

## 2025-06-30 PROCEDURE — 85025 COMPLETE CBC W/AUTO DIFF WBC: CPT

## 2025-06-30 PROCEDURE — 87522 HEPATITIS C REVRS TRNSCRPJ: CPT

## 2025-06-30 PROCEDURE — 86803 HEPATITIS C AB TEST: CPT

## 2025-06-30 PROCEDURE — 83036 HEMOGLOBIN GLYCOSYLATED A1C: CPT

## 2025-07-01 ENCOUNTER — RESULTS FOLLOW-UP (OUTPATIENT)
Dept: MEDICAL GROUP | Facility: PHYSICIAN GROUP | Age: 80
End: 2025-07-01

## 2025-07-01 DIAGNOSIS — B19.20 HEPATITIS C VIRUS INFECTION WITHOUT HEPATIC COMA, UNSPECIFIED CHRONICITY: Primary | ICD-10-CM

## 2025-07-01 LAB
EST. AVERAGE GLUCOSE BLD GHB EST-MCNC: 108 MG/DL
GAMMA INTERFERON BACKGROUND BLD IA-ACNC: 0.02 IU/ML
HBA1C MFR BLD: 5.4 % (ref 4–5.6)
M TB IFN-G BLD-IMP: NEGATIVE
M TB IFN-G CD4+ BCKGRND COR BLD-ACNC: -0.01 IU/ML
MITOGEN IGNF BCKGRD COR BLD-ACNC: >10 IU/ML
QFT TB2 - NIL TBQ2: -0.01 IU/ML

## 2025-07-02 NOTE — Clinical Note
REFERRAL APPROVAL NOTICE         Sent on July 2, 2025                   Florin Brooks  [address]   [address]  [facility] [address]                   Dear Mr. Brooks,    After a careful review of the medical information and benefit coverage, [facility] has processed your referral. See below for additional details.    If applicable, you must be actively enrolled with your insurance for coverage of the authorized service. If you have any questions regarding your coverage, please contact your insurance directly.    REFERRAL INFORMATION   Referral #:  [id number]  Referred-To Provider    Referred-By Provider:  Gastroenterology    Judah Sepulveda M.D.   [facility]      [address]  [place] [address]  [phone] [address] [facility] [place] [address]  [phone]    Referral Start Date:  07/01/2025  Referral End Date:   07/01/2026             SCHEDULING  If you do not already have an appointment, please call [phone] to make an appointment.     MORE INFORMATION  If you do not already have a [facility] account, sign up at: [email or web address]  You can access your medical information, make appointments, see lab results, billing information, and more.  If you have questions regarding this referral, please contact  the [facility] Referrals department at:             [phone]. Monday - Friday 8:00AM - 5:00PM.     Sincerely,    [facility]

## 2025-07-03 LAB
HCV RNA SERPL NAA+PROBE-ACNC: NOT DETECTED IU/ML
HCV RNA SERPL NAA+PROBE-LOG IU: NOT DETECTED LOG IU/ML
HCV RNA SERPL QL NAA+PROBE: NOT DETECTED

## 2025-08-13 ENCOUNTER — HOSPITAL ENCOUNTER (OUTPATIENT)
Dept: RADIOLOGY | Facility: MEDICAL CENTER | Age: 80
End: 2025-08-13
Attending: INTERNAL MEDICINE
Payer: COMMERCIAL

## 2025-08-13 DIAGNOSIS — J43.9 EMPHYSEMATOUS BLEB (HCC): ICD-10-CM

## 2025-08-13 DIAGNOSIS — J84.89 ORGANIZED PNEUMONIA (HCC): ICD-10-CM

## 2025-08-13 PROCEDURE — 71250 CT THORAX DX C-: CPT
